# Patient Record
Sex: FEMALE | Race: WHITE | NOT HISPANIC OR LATINO | URBAN - METROPOLITAN AREA
[De-identification: names, ages, dates, MRNs, and addresses within clinical notes are randomized per-mention and may not be internally consistent; named-entity substitution may affect disease eponyms.]

---

## 2022-09-23 ENCOUNTER — EMERGENCY (EMERGENCY)
Facility: HOSPITAL | Age: 87
LOS: 1 days | Discharge: HOME | End: 2022-09-23
Attending: STUDENT IN AN ORGANIZED HEALTH CARE EDUCATION/TRAINING PROGRAM | Admitting: STUDENT IN AN ORGANIZED HEALTH CARE EDUCATION/TRAINING PROGRAM
Payer: MEDICARE

## 2022-09-23 VITALS
SYSTOLIC BLOOD PRESSURE: 124 MMHG | HEART RATE: 84 BPM | RESPIRATION RATE: 18 BRPM | DIASTOLIC BLOOD PRESSURE: 68 MMHG | TEMPERATURE: 99 F | OXYGEN SATURATION: 94 %

## 2022-09-23 VITALS
TEMPERATURE: 98 F | DIASTOLIC BLOOD PRESSURE: 81 MMHG | RESPIRATION RATE: 18 BRPM | OXYGEN SATURATION: 95 % | WEIGHT: 162.04 LBS | HEART RATE: 101 BPM | HEIGHT: 66 IN | SYSTOLIC BLOOD PRESSURE: 171 MMHG

## 2022-09-23 LAB
ALBUMIN SERPL ELPH-MCNC: 4.2 G/DL — SIGNIFICANT CHANGE UP (ref 3.3–5)
ALP SERPL-CCNC: 63 U/L — SIGNIFICANT CHANGE UP (ref 40–120)
ALT FLD-CCNC: 32 U/L — SIGNIFICANT CHANGE UP (ref 10–45)
ANION GAP SERPL CALC-SCNC: 14 MMOL/L — SIGNIFICANT CHANGE UP (ref 5–17)
APTT BLD: 27.6 SEC — SIGNIFICANT CHANGE UP (ref 27.5–35.5)
AST SERPL-CCNC: 39 U/L — SIGNIFICANT CHANGE UP (ref 10–40)
BILIRUB SERPL-MCNC: 0.9 MG/DL — SIGNIFICANT CHANGE UP (ref 0.2–1.2)
BUN SERPL-MCNC: 19 MG/DL — SIGNIFICANT CHANGE UP (ref 7–23)
CALCIUM SERPL-MCNC: 9.5 MG/DL — SIGNIFICANT CHANGE UP (ref 8.4–10.5)
CHLORIDE SERPL-SCNC: 97 MMOL/L — SIGNIFICANT CHANGE UP (ref 96–108)
CO2 SERPL-SCNC: 24 MMOL/L — SIGNIFICANT CHANGE UP (ref 22–31)
CREAT SERPL-MCNC: 0.37 MG/DL — LOW (ref 0.5–1.3)
EGFR: 93 ML/MIN/1.73M2 — SIGNIFICANT CHANGE UP
GLUCOSE SERPL-MCNC: 126 MG/DL — HIGH (ref 70–99)
HCT VFR BLD CALC: 36.9 % — SIGNIFICANT CHANGE UP (ref 34.5–45)
HGB BLD-MCNC: 11.8 G/DL — SIGNIFICANT CHANGE UP (ref 11.5–15.5)
INR BLD: 1.22 — HIGH (ref 0.88–1.16)
MCHC RBC-ENTMCNC: 26.1 PG — LOW (ref 27–34)
MCHC RBC-ENTMCNC: 32 GM/DL — SIGNIFICANT CHANGE UP (ref 32–36)
MCV RBC AUTO: 81.6 FL — SIGNIFICANT CHANGE UP (ref 80–100)
NRBC # BLD: 0 /100 WBCS — SIGNIFICANT CHANGE UP (ref 0–0)
PLATELET # BLD AUTO: 204 K/UL — SIGNIFICANT CHANGE UP (ref 150–400)
POTASSIUM SERPL-MCNC: 4.5 MMOL/L — SIGNIFICANT CHANGE UP (ref 3.5–5.3)
POTASSIUM SERPL-SCNC: 4.5 MMOL/L — SIGNIFICANT CHANGE UP (ref 3.5–5.3)
PROT SERPL-MCNC: 7.4 G/DL — SIGNIFICANT CHANGE UP (ref 6–8.3)
PROTHROM AB SERPL-ACNC: 14.5 SEC — HIGH (ref 10.5–13.4)
RBC # BLD: 4.52 M/UL — SIGNIFICANT CHANGE UP (ref 3.8–5.2)
RBC # FLD: 15.2 % — HIGH (ref 10.3–14.5)
SODIUM SERPL-SCNC: 135 MMOL/L — SIGNIFICANT CHANGE UP (ref 135–145)
WBC # BLD: 8.14 K/UL — SIGNIFICANT CHANGE UP (ref 3.8–10.5)
WBC # FLD AUTO: 8.14 K/UL — SIGNIFICANT CHANGE UP (ref 3.8–10.5)

## 2022-09-23 PROCEDURE — 99283 EMERGENCY DEPT VISIT LOW MDM: CPT

## 2022-09-23 PROCEDURE — 85027 COMPLETE CBC AUTOMATED: CPT

## 2022-09-23 PROCEDURE — 36415 COLL VENOUS BLD VENIPUNCTURE: CPT

## 2022-09-23 PROCEDURE — 80053 COMPREHEN METABOLIC PANEL: CPT

## 2022-09-23 PROCEDURE — 85610 PROTHROMBIN TIME: CPT

## 2022-09-23 PROCEDURE — 99284 EMERGENCY DEPT VISIT MOD MDM: CPT

## 2022-09-23 PROCEDURE — 85730 THROMBOPLASTIN TIME PARTIAL: CPT

## 2022-09-23 NOTE — ED PROVIDER NOTE - CLINICAL SUMMARY MEDICAL DECISION MAKING FREE TEXT BOX
Nosebleed on eliquis s/p cauterization 2 days ago at [facility].  Pt is HDS in ED, no bleeding at present. Will monitor for continued bleeding while checking basic labs r/o sig anemia.  Pt will not take home dose of eliquis tonight and re-start meds tomorrow.  Instructed to f/u w ENT at [facility] who performed procedure.  Return precautions given for recurrence of bleeding, lightheadedness, syncope.

## 2022-09-23 NOTE — ED PROVIDER NOTE - OBJECTIVE STATEMENT
89 yo F w PMH afib on eliquis, HTN, TIA, p/w nosebleed from home. Pt had caudery 2 days ago at [facility] for nosebleeds. Since that time she was doing well with no bleed until today just prior to arrival when she spit out a blood clot from her mouth and started having bleeding from her nose. EMS brought pt to [facility] ED where the bleeding has since stopped. No additional bleeding per nares or mouth since arrival. Pt has no lightheadedness, syncope, cp, sob, HA, fall/trauma.

## 2022-09-23 NOTE — ED ADULT NURSE NOTE - OBJECTIVE STATEMENT
patient arrived to ED c/o nosebleed that started today. patient on eliquis. no active bleeding at this time. denies fever, cough, shortness of breath, nausea or vomiting.

## 2022-09-23 NOTE — ED PROVIDER NOTE - NSFOLLOWUPINSTRUCTIONS_ED_ALL_ED_FT
Follow up with your primary medical doctor as soon as possible.    Return to the emergency department if your symptoms worsen or if you develop new symptoms.    Epistaxis    Epistaxis is the medical term for a nosebleed. Nosebleeds are common and can be caused by many conditions, such as injury, infections, dry environments, medicines, nose picking, and home heating and cooling systems. Try controlling your nosebleed by pinching your nose continuously for at least 10 minutes. Avoid lying down while you are having a nosebleed. Sit up and lean forward. Avoid blowing or sniffing your nose for a number of hours after having a nosebleed. Resume your normal activities as you are able, but avoid straining, lifting, or bending at the waist for several days. Maintain humidity in your home by using less air conditioning or by using a humidifier.     If your nose was packed by your health care provider, keep the packing inside of your nose until a health care provider removes it. If a balloon catheter was used to pack your nose, do not cut or remove it unless your health care provider has instructed you to do that.     Aspirin and blood thinners make bleeding more likely. If you are prescribed these medicines and you suffer from nosebleeds, ask your health care provider if you should stop taking the medicines or adjust the dose. Do not stop medicines unless directed by your health care provider.    SEEK IMMEDIATE MEDICAL CARE IF YOU HAVE ANY OF THE FOLLOWING SYMPTOMS: nosebleed lasting longer than 20 minutes, unusual bleeding from or bruising on other parts of your body, dizziness or lightheadedness, fainting, nosebleed occurring after a head injury, or fever.

## 2022-09-23 NOTE — ED PROVIDER NOTE - NS ED ROS FT
CONSTITUTIONAL: No fever, no chills, no fatigue  EYES: No eye redness, no visual changes  ENT: No ear pain, no sore throat, + nosebleed  CARDIOVASCULAR: No chest pain, no palpitations  RESPIRATORY: No cough, no SOB  GI: No abdominal pain, no nausea, no vomiting, no constipation, no diarrhea  GENITOURINARY: No dysuria, no frequency, no hematuria  MUSCULOSKELETAL: No back pain, no joint pain, no myalgias  SKIN: No rash, no peripheral edema  NEURO: No headache, no confusion    ALL OTHER SYSTEMS NEGATIVE.

## 2022-09-23 NOTE — ED PROVIDER NOTE - PROGRESS NOTE DETAILS
Pt has no bleeding in ED. Pending CBC. No sig anemia, no more ep bleeding in ED. Will DC w f/u to private ENT at [facility].  Pt is ready for discharge and safe discharge has been established. Pt was treated for epistaxis and showed improvement. Will d/c with outpatient follow-up.    Strict return-precautions were given and understanding was verbalized by patient.

## 2022-09-23 NOTE — ED PROVIDER NOTE - PHYSICAL EXAMINATION
CONSTITUTIONAL: Non-toxic; in no apparent distress  HEAD: Normocephalic; atraumatic  EYES: PERRL; EOM intact   ENMT: External appears normal, no bleeding, no sepal hematoma  NECK: Supple; non-tender  CARD: Normal S1, S2; no murmurs, rubs, or gallops  RESP: Normal chest excursion with respiration; breath sounds clear and equal bilaterally  ABD: Soft, non-distended; non-tender  EXT: Normal ROM in all four extremities; non-tender to palpation  SKIN: Warm, dry, no rash  NEURO:  No focal neurological deficiencies.

## 2022-09-25 DIAGNOSIS — R04.0 EPISTAXIS: ICD-10-CM

## 2022-09-25 DIAGNOSIS — I48.91 UNSPECIFIED ATRIAL FIBRILLATION: ICD-10-CM

## 2022-09-25 DIAGNOSIS — I10 ESSENTIAL (PRIMARY) HYPERTENSION: ICD-10-CM

## 2022-09-25 DIAGNOSIS — Z86.73 PERSONAL HISTORY OF TRANSIENT ISCHEMIC ATTACK (TIA), AND CEREBRAL INFARCTION WITHOUT RESIDUAL DEFICITS: ICD-10-CM

## 2022-09-25 DIAGNOSIS — Z79.01 LONG TERM (CURRENT) USE OF ANTICOAGULANTS: ICD-10-CM

## 2022-12-20 ENCOUNTER — INPATIENT (INPATIENT)
Facility: HOSPITAL | Age: 87
LOS: 13 days | Discharge: NURSING, REHAB OR LONG-TERM CARE | DRG: 291 | End: 2023-01-03
Attending: INTERNAL MEDICINE | Admitting: HOSPITALIST
Payer: MEDICARE

## 2022-12-20 VITALS
OXYGEN SATURATION: 96 % | HEART RATE: 102 BPM | TEMPERATURE: 98 F | HEIGHT: 66 IN | RESPIRATION RATE: 20 BRPM | SYSTOLIC BLOOD PRESSURE: 107 MMHG | WEIGHT: 162.92 LBS | DIASTOLIC BLOOD PRESSURE: 66 MMHG

## 2022-12-20 DIAGNOSIS — E03.9 HYPOTHYROIDISM, UNSPECIFIED: ICD-10-CM

## 2022-12-20 DIAGNOSIS — I10 ESSENTIAL (PRIMARY) HYPERTENSION: ICD-10-CM

## 2022-12-20 DIAGNOSIS — I48.91 UNSPECIFIED ATRIAL FIBRILLATION: ICD-10-CM

## 2022-12-20 DIAGNOSIS — E78.5 HYPERLIPIDEMIA, UNSPECIFIED: ICD-10-CM

## 2022-12-20 DIAGNOSIS — I50.9 HEART FAILURE, UNSPECIFIED: ICD-10-CM

## 2022-12-20 LAB
ALBUMIN SERPL ELPH-MCNC: 4 G/DL — SIGNIFICANT CHANGE UP (ref 3.3–5)
ALP SERPL-CCNC: 72 U/L — SIGNIFICANT CHANGE UP (ref 40–120)
ALT FLD-CCNC: 35 U/L — SIGNIFICANT CHANGE UP (ref 10–45)
ANION GAP SERPL CALC-SCNC: 8 MMOL/L — SIGNIFICANT CHANGE UP (ref 5–17)
APTT BLD: 34.9 SEC — SIGNIFICANT CHANGE UP (ref 27.5–35.5)
AST SERPL-CCNC: 39 U/L — SIGNIFICANT CHANGE UP (ref 10–40)
BASOPHILS # BLD AUTO: 0.05 K/UL — SIGNIFICANT CHANGE UP (ref 0–0.2)
BASOPHILS NFR BLD AUTO: 0.7 % — SIGNIFICANT CHANGE UP (ref 0–2)
BILIRUB SERPL-MCNC: 1.5 MG/DL — HIGH (ref 0.2–1.2)
BUN SERPL-MCNC: 13 MG/DL — SIGNIFICANT CHANGE UP (ref 7–23)
CALCIUM SERPL-MCNC: 9.3 MG/DL — SIGNIFICANT CHANGE UP (ref 8.4–10.5)
CHLORIDE SERPL-SCNC: 94 MMOL/L — LOW (ref 96–108)
CK MB CFR SERPL CALC: 3.9 NG/ML — SIGNIFICANT CHANGE UP (ref 0–6.7)
CK SERPL-CCNC: 91 U/L — SIGNIFICANT CHANGE UP (ref 25–170)
CO2 SERPL-SCNC: 30 MMOL/L — SIGNIFICANT CHANGE UP (ref 22–31)
CREAT SERPL-MCNC: 0.61 MG/DL — SIGNIFICANT CHANGE UP (ref 0.5–1.3)
EGFR: 82 ML/MIN/1.73M2 — SIGNIFICANT CHANGE UP
EOSINOPHIL # BLD AUTO: 0.21 K/UL — SIGNIFICANT CHANGE UP (ref 0–0.5)
EOSINOPHIL NFR BLD AUTO: 3 % — SIGNIFICANT CHANGE UP (ref 0–6)
GLUCOSE SERPL-MCNC: 123 MG/DL — HIGH (ref 70–99)
HCT VFR BLD CALC: 36.3 % — SIGNIFICANT CHANGE UP (ref 34.5–45)
HGB BLD-MCNC: 11.5 G/DL — SIGNIFICANT CHANGE UP (ref 11.5–15.5)
IMM GRANULOCYTES NFR BLD AUTO: 0.4 % — SIGNIFICANT CHANGE UP (ref 0–0.9)
INR BLD: 1.67 — HIGH (ref 0.88–1.16)
LYMPHOCYTES # BLD AUTO: 1.36 K/UL — SIGNIFICANT CHANGE UP (ref 1–3.3)
LYMPHOCYTES # BLD AUTO: 19.6 % — SIGNIFICANT CHANGE UP (ref 13–44)
MCHC RBC-ENTMCNC: 24.8 PG — LOW (ref 27–34)
MCHC RBC-ENTMCNC: 31.7 GM/DL — LOW (ref 32–36)
MCV RBC AUTO: 78.4 FL — LOW (ref 80–100)
MONOCYTES # BLD AUTO: 0.98 K/UL — HIGH (ref 0–0.9)
MONOCYTES NFR BLD AUTO: 14.1 % — HIGH (ref 2–14)
NEUTROPHILS # BLD AUTO: 4.32 K/UL — SIGNIFICANT CHANGE UP (ref 1.8–7.4)
NEUTROPHILS NFR BLD AUTO: 62.2 % — SIGNIFICANT CHANGE UP (ref 43–77)
NRBC # BLD: 0 /100 WBCS — SIGNIFICANT CHANGE UP (ref 0–0)
NT-PROBNP SERPL-SCNC: 3126 PG/ML — HIGH (ref 0–300)
PLATELET # BLD AUTO: 196 K/UL — SIGNIFICANT CHANGE UP (ref 150–400)
POTASSIUM SERPL-MCNC: 4.5 MMOL/L — SIGNIFICANT CHANGE UP (ref 3.5–5.3)
POTASSIUM SERPL-SCNC: 4.5 MMOL/L — SIGNIFICANT CHANGE UP (ref 3.5–5.3)
PROT SERPL-MCNC: 6.9 G/DL — SIGNIFICANT CHANGE UP (ref 6–8.3)
PROTHROM AB SERPL-ACNC: 20 SEC — HIGH (ref 10.5–13.4)
RBC # BLD: 4.63 M/UL — SIGNIFICANT CHANGE UP (ref 3.8–5.2)
RBC # FLD: 16.2 % — HIGH (ref 10.3–14.5)
SARS-COV-2 RNA SPEC QL NAA+PROBE: NEGATIVE — SIGNIFICANT CHANGE UP
SODIUM SERPL-SCNC: 132 MMOL/L — LOW (ref 135–145)
TROPONIN T SERPL-MCNC: 0.01 NG/ML — SIGNIFICANT CHANGE UP (ref 0–0.01)
WBC # BLD: 6.95 K/UL — SIGNIFICANT CHANGE UP (ref 3.8–10.5)
WBC # FLD AUTO: 6.95 K/UL — SIGNIFICANT CHANGE UP (ref 3.8–10.5)

## 2022-12-20 PROCEDURE — 99285 EMERGENCY DEPT VISIT HI MDM: CPT | Mod: FS,25

## 2022-12-20 PROCEDURE — 93010 ELECTROCARDIOGRAM REPORT: CPT

## 2022-12-20 PROCEDURE — 71045 X-RAY EXAM CHEST 1 VIEW: CPT | Mod: 26

## 2022-12-20 RX ORDER — ALPRAZOLAM 0.25 MG
0.25 TABLET ORAL ONCE
Refills: 0 | Status: DISCONTINUED | OUTPATIENT
Start: 2022-12-20 | End: 2022-12-20

## 2022-12-20 RX ORDER — FUROSEMIDE 40 MG
1 TABLET ORAL
Qty: 0 | Refills: 0 | DISCHARGE

## 2022-12-20 RX ORDER — MECLIZINE HCL 12.5 MG
1 TABLET ORAL
Qty: 0 | Refills: 0 | DISCHARGE

## 2022-12-20 RX ORDER — VALSARTAN 80 MG/1
40 TABLET ORAL DAILY
Refills: 0 | Status: DISCONTINUED | OUTPATIENT
Start: 2022-12-21 | End: 2022-12-22

## 2022-12-20 RX ORDER — FUROSEMIDE 40 MG
40 TABLET ORAL EVERY 12 HOURS
Refills: 0 | Status: DISCONTINUED | OUTPATIENT
Start: 2022-12-20 | End: 2022-12-21

## 2022-12-20 RX ORDER — INFLUENZA VIRUS VACCINE 15; 15; 15; 15 UG/.5ML; UG/.5ML; UG/.5ML; UG/.5ML
0.7 SUSPENSION INTRAMUSCULAR ONCE
Refills: 0 | Status: COMPLETED | OUTPATIENT
Start: 2022-12-20 | End: 2022-12-20

## 2022-12-20 RX ORDER — LEVOTHYROXINE SODIUM 125 MCG
1 TABLET ORAL
Qty: 0 | Refills: 0 | DISCHARGE

## 2022-12-20 RX ORDER — ATORVASTATIN CALCIUM 80 MG/1
20 TABLET, FILM COATED ORAL AT BEDTIME
Refills: 0 | Status: DISCONTINUED | OUTPATIENT
Start: 2022-12-20 | End: 2023-01-03

## 2022-12-20 RX ORDER — VALSARTAN 80 MG/1
1 TABLET ORAL
Qty: 0 | Refills: 0 | DISCHARGE

## 2022-12-20 RX ORDER — ATENOLOL 25 MG/1
25 TABLET ORAL DAILY
Refills: 0 | Status: DISCONTINUED | OUTPATIENT
Start: 2022-12-20 | End: 2022-12-21

## 2022-12-20 RX ORDER — POTASSIUM CHLORIDE 20 MEQ
1 PACKET (EA) ORAL
Qty: 0 | Refills: 0 | DISCHARGE

## 2022-12-20 RX ORDER — FUROSEMIDE 40 MG
40 TABLET ORAL ONCE
Refills: 0 | Status: COMPLETED | OUTPATIENT
Start: 2022-12-20 | End: 2022-12-20

## 2022-12-20 RX ORDER — APIXABAN 2.5 MG/1
5 TABLET, FILM COATED ORAL EVERY 12 HOURS
Refills: 0 | Status: DISCONTINUED | OUTPATIENT
Start: 2022-12-20 | End: 2022-12-25

## 2022-12-20 RX ORDER — LEVOTHYROXINE SODIUM 125 MCG
100 TABLET ORAL DAILY
Refills: 0 | Status: DISCONTINUED | OUTPATIENT
Start: 2022-12-21 | End: 2023-01-03

## 2022-12-20 RX ADMIN — Medication 40 MILLIGRAM(S): at 17:43

## 2022-12-20 RX ADMIN — APIXABAN 5 MILLIGRAM(S): 2.5 TABLET, FILM COATED ORAL at 22:52

## 2022-12-20 RX ADMIN — ATORVASTATIN CALCIUM 20 MILLIGRAM(S): 80 TABLET, FILM COATED ORAL at 22:53

## 2022-12-20 RX ADMIN — Medication 0.25 MILLIGRAM(S): at 22:53

## 2022-12-20 NOTE — H&P ADULT - NSHPLABSRESULTS_GEN_ALL_CORE
11.5   6.95  )-----------( 196      ( 20 Dec 2022 17:07 )             36.3     132<L>  |  94<L>  |  13  ----------------------------<  123<H>  4.5   |  30  |  0.61    Ca    9.3      20 Dec 2022 17:07    TPro  6.9  /  Alb  4.0  /  TBili  1.5<H>  /  DBili  x   /  AST  39  /  ALT  35  /  AlkPhos  72  12-20    PT/INR - ( 20 Dec 2022 17:07 )   PT: 20.0 sec;   INR: 1.67        PTT - ( 20 Dec 2022 17:07 )  PTT:34.9 sec    CARDIAC MARKERS ( 20 Dec 2022 17:07 )  x     / 0.01 ng/mL / 91 U/L / x     / 3.9 ng/mL    EKG: AFib w/ RVR

## 2022-12-20 NOTE — H&P ADULT - PROBLEM SELECTOR PLAN 1
--PMHx of CHF takes Lasix 20mg PO BID at home.   --Worsening LE edema over 2 months; denies any respiratory symptoms.   --Pt car to [facility] and bedside echo possible reduced EF.   --BNP 3126.   --TTE ordered - f/u.   --CONT: Lasix 40mg IV BID.  --Indwelling soto placed in ER -- **needs to be removed and replaced with primafit once patient is on the floor.    --PLAN: IV diuresis; recommend ACE wraps tomorrow (pt currently w/ adequate UOP); TTE for structural eval.

## 2022-12-20 NOTE — H&P ADULT - ASSESSMENT
Pt is 89yo F w/ PMHx of HTN, HLD, Hypothyroidism, TIA, s/p AVR, AFib (on Eliquis, s/p ablation > 10yrs ago), CHF (per outpatient bedside echo EF appears reduced) who presented to [facility] ED on 12/20/22 endorsing worsening LE edema over the past 2 months. Pt's outpatient cardiologist, Dr. David, sent her to [facility], at which time a bedside limited echo was done showing a possibly reduced LVEF. Pt found to have volume overload peripherally and in AFib w/ RVR (HR 100s). Pt denies CP, SOB, palpitations, dizziness, orthopnea, N/V, fever/chills, syncope, PND.     VITALS: HR 100s, -120/66-90, SpO2 98% on RA, RR 18, T 97.5F.   LABS: WBC 6.95, Hgb/Hct 11.5/36.3, Plt Cnt 196, Na 132, K 4.5, BUN/Cr 13/0.61, Trop T 0.01, CK 91, CKMB 39, BNP 3126.     TREATMENT IN ED: Lasix 40mg IV x1.     Patient admitted to cardiology for heart failure exacerbation and atrial fibrillation.  89yo F w/ PMHx of HTN, HLD, Hypothyroidism, TIA, s/p AVR, AFib (on Eliquis, s/p ablation > 10yrs ago), CHF (per outpt bedside echo EF appeared reduced) who presented w/ volume overload w/ worsening LE edema over the past 2 months. Pt also in AFib w/ RVR. IV diuresis w/ Lasix 40mg IV BID. [facility] EP consult in AM for rhythm management (Dr. Sarai wallace from ER provider).

## 2022-12-20 NOTE — H&P ADULT - PROBLEM SELECTOR PLAN 2
--AFib w/ RVR w/ HR in 100s.   --HOME MEDS:   --s/p ablation > 10yrs ago. Known by Dr. Moon.   --CONT: ____  --Official EP consult in AM (Dr. Moon) to discuss rhythm management - ER team spoke to Dr. Moon. --AFib w/ RVR w/ HR in 100s.   --s/p ablation > 10yrs ago. Known by Dr. Moon.   --CONT: Eliquis 5mg PO BID and Atenolol 25mg PO QD.   --Official EP consult in AM (Dr. Moon) to discuss rhythm management - ER team spoke to Dr. Moon.

## 2022-12-20 NOTE — H&P ADULT - NS ATTEND AMEND GEN_ALL_CORE FT
Initial attending contact date 12.21.22 on morning bedside rounds. See attending addendum to HPI here for initial attending contact documentation.  89F w/ Essential  HTN, HLD, Hypothyroidism, hx TIA, hx AVR, AFib (on Eliquis, s/p ablation > 10yrs ago), Chronic CHF of unknown specificity who p/w acute on chronic decompensated CHF and AFib w/ RVR.   Physical Exam notable for: elderly patient laying in bed in NAD, elevated neck veins, irreg irreg rhythm, tachycardic rate, no MGR detected at high rate, diminished breath sounds in bases, overly nourished abdomen, no fluid wave detected, anasarca to hips b/l, skin WWP, A&Ox3, heavy assist to mobilize in bed, wheelchair noted at bedside    Plan for:  Diurese with IV Lasix 40 TID, goal net neg 2-3L/day  Lopressor for rate control, Eliquis for afib cva risk reduction  EP consulted for rate/rhythm control  Obtain TTE for structural assessment  Core Measure CHF orders  ACE wrap lower extremities to the thighs bilaterally  Bedside CHF Education, supplies to be given to patient  PT consult, patient high probability for JENNIE  Dietician/Nutrition consult for CHF/cardiac diet reinforcement  Future planning: patient to have 1wk f/u appt made with Cardiologist for quality improvement CHF readmission risk reduction  Kit Arciniega M.D.  Cardiology Attending

## 2022-12-20 NOTE — ED PROVIDER NOTE - CLINICAL SUMMARY MEDICAL DECISION MAKING FREE TEXT BOX
89 yo F with PMH afib on eliquis s/p ablation >10 years ago, HTN, HLD, hypothyroidism, TIA, s/p AV replacement sent in for afib and CHF. Pt reports progressively worsening LE swelling x 2months. Denies fever, chills, cp, sob, palpitations, sweats, n/v, dizziness, orthopnea, PND. , /66, HR 96%. EKG afib @ 109. Lungs cta b/l. 4+ pitting edema to knees. 89 yo F with PMH afib on eliquis s/p ablation >10 years ago, HTN, HLD, hypothyroidism, TIA, s/p AV replacement sent in for afib and CHF. Pt reports progressively worsening LE swelling x 2months. Denies fever, chills, cp, sob, palpitations, sweats, n/v, dizziness, orthopnea, PND. , /66, HR 96%. EKG afib @ 109. Lungs cta b/l. 4+ pitting edema to knees. CHF/afib, labs, diuresis, r/o acs

## 2022-12-20 NOTE — H&P ADULT - RESPIRATORY
normal/clear to auscultation bilaterally/no wheezes/no rales/no rhonchi/no respiratory distress/no use of accessory muscles/no subcutaneous emphysema/airway patent/breath sounds equal/respirations non-labored

## 2022-12-20 NOTE — PATIENT PROFILE ADULT - DO YOU FEEL LIKE HURTING YOURSELF OR OTHERS?
Render Note In Bullet Format When Appropriate: No Duration Of Freeze Thaw-Cycle (Seconds): 0 Post-Care Instructions: I reviewed with the patient in detail post-care instructions. Patient is to wear sunprotection, and avoid picking at any of the treated lesions. Pt may apply Vaseline to crusted or scabbing areas. Number Of Freeze-Thaw Cycles: 1 freeze-thaw cycle Detail Level: Detailed Consent: The patient's consent was obtained including but not limited to risks of crusting, scabbing, blistering, scarring, darker or lighter pigmentary change, recurrence, incomplete removal and infection. Medical Necessity Clause: This procedure was medically necessary because the lesions that were treated were: Medical Necessity Information: It is in your best interest to select a reason for this procedure from the list below. All of these items fulfill various CMS LCD requirements except the new and changing color options. no

## 2022-12-20 NOTE — H&P ADULT - PROBLEM SELECTOR PLAN 5
--CONT: ___ --CONT: Atorvastatin 20mg PO QHS  --f/u lipid profile in AM.      DVT ppx: Eliquis  Dispo: pending euvolemic / completion of work up  PT Eval: pending

## 2022-12-20 NOTE — H&P ADULT - GASTROINTESTINAL
Labs look great. normal/soft/nontender/nondistended/normal active bowel sounds/no guarding/no rigidity

## 2022-12-20 NOTE — ED ADULT NURSE NOTE - OBJECTIVE STATEMENT
89 year old F patient, A+Ox3, brought in accompanied by daughter with c/o of being sent from EP office for afib episode.  Pt has no complaints.  Bilateral leg swelling noted, as per daughter "worsening this week".  No distress noted.  Breathing easily and unlabored, denies sob.  Denies chest pain.  Placed on CM, line placed, soto placed, medicated as ordered.  Will continue to monitor.

## 2022-12-20 NOTE — ED PROVIDER NOTE - OBJECTIVE STATEMENT
89 yo F with PMH afib on eliquis s/p ablation >10 years ago, HTN, HLD, hypothyroidism, TIA, s/p AV replacement sent in for afib and CHF. Pt reports progressively worsening LE swelling x 2months. Pt was sent to [facility] by her cardiologist Dr. David at [facility]. Pt was found to be in afib and had a bedside echo which showed a reduced EF. Pts cardiolgist states she was doing well up until a couple of months ago and she has been unable to properly manage her at home. Pts cardiologist spoke with Dr. Moon who advised her to come here. Denies fever, chills, cp, sob, palpitations, sweats, n/v, dizziness, orthopnea, PND.

## 2022-12-20 NOTE — ED PROVIDER NOTE - PROGRESS NOTE DETAILS
spoke with Dr. Moon EP team will see pt tomorrow. Will admit for cards for CHF/afib, IV diuresis soto placed, pt put out 250cc in 30 min

## 2022-12-20 NOTE — H&P ADULT - HISTORY OF PRESENT ILLNESS
Pt is 89yo F w/ PMHx of HTN, HLD, Hypothyroidism, TIA, s/p AVR, AFib (on Eliquis, s/p ablation > 10yrs ago), CHF (per outpatient bedside echo EF appears reduced) who presented to [facility] ED on 12/20/22 endorsing worsening LE edema over the past 2 months. Pt's outpatient cardiologist, Dr. David, sent her to [facility], at which time a bedside limited echo was done showing a possibly reduced LVEF. Pt found to have volume overload peripherally and in AFib w/ RVR (HR 100s). Pt denies CP, SOB, palpitations, dizziness, orthopnea, N/V, fever/chills, syncope, PND.     VITALS: HR 100s, -120/66-90, SpO2 98% on RA, RR 18, T 97.5F.   LABS: WBC 6.95, Hgb/Hct 11.5/36.3, Plt Cnt 196, Na 132, K 4.5, BUN/Cr 13/0.61, Trop T 0.01, CK 91, CKMB 39, BNP 3126.     TREATMENT IN ED: Lasix 40mg IV x1.     Patient admitted to cardiology for heart failure exacerbation and atrial fibrillation.

## 2022-12-20 NOTE — H&P ADULT - NSICDXPASTMEDICALHX_GEN_ALL_CORE_FT
PAST MEDICAL HISTORY:  Atrial fibrillation     Congestive heart failure (CHF)     HLD (hyperlipidemia)     HTN (hypertension)     Hypothyroidism     TIA (transient ischemic attack)

## 2022-12-20 NOTE — ED ADULT TRIAGE NOTE - CHIEF COMPLAINT QUOTE
sent in to see MD Moon. DX with afib on dec 5th, was in the office today was found to be in afib. PT reports palpitations. denies SOB, dizziness, CP.  on Eliquis.

## 2022-12-20 NOTE — ED PROVIDER NOTE - PHYSICAL EXAMINATION
CONSTITUTIONAL: elderly female lying in bed in NAD   HEAD: Normocephalic; atraumatic.   EYES: PERRL; EOM intact; conjunctiva and sclera clear  ENT: normal nose; no rhinorrhea; normal pharynx with no erythema or lesions.   NECK: Supple; non-tender; no LAD  CARDIOVASCULAR: Normal S1, S2; No audible murmurs. irregularly irregular  RESPIRATORY: Breathing easily; breath sounds clear and equal bilaterally; no wheezes, rhonchi, or rales.  GI: Soft; non-distended; non-tender; no palpable organomegaly.   MSK: FROM at all extremities, normal tone   EXT: b/l 4+ pitting edema up to knees   SKIN: Normal for age and race; warm; dry; good turgor; no apparent lesions or rash.   NEURO: A & O x 3; face symmetric; grossly unremarkable.   PSYCHOLOGICAL: The patient’s mood and manner are appropriate.

## 2022-12-20 NOTE — PATIENT PROFILE ADULT - FALL HARM RISK - HARM RISK INTERVENTIONS

## 2022-12-20 NOTE — ED PROVIDER NOTE - NS ED ATTENDING STATEMENT MOD
This was a shared visit with the AMIE. I reviewed and verified the documentation and independently performed the documented:

## 2022-12-21 DIAGNOSIS — R17 UNSPECIFIED JAUNDICE: ICD-10-CM

## 2022-12-21 LAB
A1C WITH ESTIMATED AVERAGE GLUCOSE RESULT: 6.4 % — HIGH (ref 4–5.6)
ALBUMIN SERPL ELPH-MCNC: 3.7 G/DL — SIGNIFICANT CHANGE UP (ref 3.3–5)
ALP SERPL-CCNC: 66 U/L — SIGNIFICANT CHANGE UP (ref 40–120)
ALT FLD-CCNC: 32 U/L — SIGNIFICANT CHANGE UP (ref 10–45)
ANION GAP SERPL CALC-SCNC: 11 MMOL/L — SIGNIFICANT CHANGE UP (ref 5–17)
APPEARANCE UR: CLEAR — SIGNIFICANT CHANGE UP
AST SERPL-CCNC: 34 U/L — SIGNIFICANT CHANGE UP (ref 10–40)
BACTERIA # UR AUTO: SIGNIFICANT CHANGE UP /HPF
BILIRUB SERPL-MCNC: 1.4 MG/DL — HIGH (ref 0.2–1.2)
BILIRUB SERPL-MCNC: 1.6 MG/DL — HIGH (ref 0.2–1.2)
BILIRUB UR-MCNC: NEGATIVE — SIGNIFICANT CHANGE UP
BUN SERPL-MCNC: 13 MG/DL — SIGNIFICANT CHANGE UP (ref 7–23)
CALCIUM SERPL-MCNC: 9 MG/DL — SIGNIFICANT CHANGE UP (ref 8.4–10.5)
CHLORIDE SERPL-SCNC: 94 MMOL/L — LOW (ref 96–108)
CHOLEST SERPL-MCNC: 125 MG/DL — SIGNIFICANT CHANGE UP
CO2 SERPL-SCNC: 28 MMOL/L — SIGNIFICANT CHANGE UP (ref 22–31)
COLOR SPEC: YELLOW — SIGNIFICANT CHANGE UP
CREAT SERPL-MCNC: 0.55 MG/DL — SIGNIFICANT CHANGE UP (ref 0.5–1.3)
DIFF PNL FLD: NEGATIVE — SIGNIFICANT CHANGE UP
EGFR: 84 ML/MIN/1.73M2 — SIGNIFICANT CHANGE UP
EPI CELLS # UR: SIGNIFICANT CHANGE UP /HPF (ref 0–5)
ESTIMATED AVERAGE GLUCOSE: 137 MG/DL — HIGH (ref 68–114)
FERRITIN SERPL-MCNC: 28 NG/ML — SIGNIFICANT CHANGE UP (ref 15–150)
GLUCOSE SERPL-MCNC: 111 MG/DL — HIGH (ref 70–99)
GLUCOSE UR QL: NEGATIVE — SIGNIFICANT CHANGE UP
HCT VFR BLD CALC: 36.7 % — SIGNIFICANT CHANGE UP (ref 34.5–45)
HDLC SERPL-MCNC: 59 MG/DL — SIGNIFICANT CHANGE UP
HGB BLD-MCNC: 11.4 G/DL — LOW (ref 11.5–15.5)
HYALINE CASTS # UR AUTO: SIGNIFICANT CHANGE UP /LPF (ref 0–2)
IRON SATN MFR SERPL: 33 UG/DL — SIGNIFICANT CHANGE UP (ref 30–160)
IRON SATN MFR SERPL: 9 % — LOW (ref 14–50)
KETONES UR-MCNC: NEGATIVE — SIGNIFICANT CHANGE UP
LEUKOCYTE ESTERASE UR-ACNC: ABNORMAL
LIPID PNL WITH DIRECT LDL SERPL: 52 MG/DL — SIGNIFICANT CHANGE UP
MAGNESIUM SERPL-MCNC: 1.9 MG/DL — SIGNIFICANT CHANGE UP (ref 1.6–2.6)
MCHC RBC-ENTMCNC: 24.3 PG — LOW (ref 27–34)
MCHC RBC-ENTMCNC: 31.1 GM/DL — LOW (ref 32–36)
MCV RBC AUTO: 78.3 FL — LOW (ref 80–100)
NITRITE UR-MCNC: NEGATIVE — SIGNIFICANT CHANGE UP
NON HDL CHOLESTEROL: 66 MG/DL — SIGNIFICANT CHANGE UP
NRBC # BLD: 0 /100 WBCS — SIGNIFICANT CHANGE UP (ref 0–0)
OSMOLALITY SERPL: 277 MOSM/KG — LOW (ref 280–301)
OSMOLALITY UR: 311 MOSM/KG — SIGNIFICANT CHANGE UP (ref 300–900)
PH UR: 6.5 — SIGNIFICANT CHANGE UP (ref 5–8)
PLATELET # BLD AUTO: 188 K/UL — SIGNIFICANT CHANGE UP (ref 150–400)
POTASSIUM SERPL-MCNC: 4 MMOL/L — SIGNIFICANT CHANGE UP (ref 3.5–5.3)
POTASSIUM SERPL-SCNC: 4 MMOL/L — SIGNIFICANT CHANGE UP (ref 3.5–5.3)
PROT SERPL-MCNC: 6.5 G/DL — SIGNIFICANT CHANGE UP (ref 6–8.3)
PROT UR-MCNC: NEGATIVE MG/DL — SIGNIFICANT CHANGE UP
RBC # BLD: 4.69 M/UL — SIGNIFICANT CHANGE UP (ref 3.8–5.2)
RBC # FLD: 16.1 % — HIGH (ref 10.3–14.5)
RBC CASTS # UR COMP ASSIST: < 5 /HPF — SIGNIFICANT CHANGE UP
SODIUM SERPL-SCNC: 133 MMOL/L — LOW (ref 135–145)
SP GR SPEC: 1.01 — SIGNIFICANT CHANGE UP (ref 1–1.03)
T4 AB SER-ACNC: 6.61 UG/DL — SIGNIFICANT CHANGE UP (ref 4.5–11.7)
TIBC SERPL-MCNC: 367 UG/DL — SIGNIFICANT CHANGE UP (ref 220–430)
TRIGL SERPL-MCNC: 68 MG/DL — SIGNIFICANT CHANGE UP
TSH SERPL-MCNC: 2.83 UIU/ML — SIGNIFICANT CHANGE UP (ref 0.27–4.2)
UIBC SERPL-MCNC: 334 UG/DL — SIGNIFICANT CHANGE UP (ref 110–370)
UROBILINOGEN FLD QL: 0.2 E.U./DL — SIGNIFICANT CHANGE UP
WBC # BLD: 6.51 K/UL — SIGNIFICANT CHANGE UP (ref 3.8–10.5)
WBC # FLD AUTO: 6.51 K/UL — SIGNIFICANT CHANGE UP (ref 3.8–10.5)
WBC UR QL: ABNORMAL /HPF

## 2022-12-21 PROCEDURE — 93306 TTE W/DOPPLER COMPLETE: CPT | Mod: 26

## 2022-12-21 PROCEDURE — 99223 1ST HOSP IP/OBS HIGH 75: CPT

## 2022-12-21 PROCEDURE — 99222 1ST HOSP IP/OBS MODERATE 55: CPT

## 2022-12-21 RX ORDER — ALPRAZOLAM 0.25 MG
0.25 TABLET ORAL ONCE
Refills: 0 | Status: DISCONTINUED | OUTPATIENT
Start: 2022-12-21 | End: 2022-12-21

## 2022-12-21 RX ORDER — METOPROLOL TARTRATE 50 MG
25 TABLET ORAL EVERY 6 HOURS
Refills: 0 | Status: DISCONTINUED | OUTPATIENT
Start: 2022-12-21 | End: 2022-12-22

## 2022-12-21 RX ORDER — METOPROLOL TARTRATE 50 MG
25 TABLET ORAL
Refills: 0 | Status: DISCONTINUED | OUTPATIENT
Start: 2022-12-21 | End: 2022-12-21

## 2022-12-21 RX ADMIN — Medication 25 MILLIGRAM(S): at 23:33

## 2022-12-21 RX ADMIN — Medication 25 MILLIGRAM(S): at 18:31

## 2022-12-21 RX ADMIN — VALSARTAN 40 MILLIGRAM(S): 80 TABLET ORAL at 06:22

## 2022-12-21 RX ADMIN — ATENOLOL 25 MILLIGRAM(S): 25 TABLET ORAL at 06:23

## 2022-12-21 RX ADMIN — APIXABAN 5 MILLIGRAM(S): 2.5 TABLET, FILM COATED ORAL at 23:34

## 2022-12-21 RX ADMIN — Medication 25 MILLIGRAM(S): at 09:50

## 2022-12-21 RX ADMIN — APIXABAN 5 MILLIGRAM(S): 2.5 TABLET, FILM COATED ORAL at 12:00

## 2022-12-21 RX ADMIN — Medication 100 MICROGRAM(S): at 06:22

## 2022-12-21 RX ADMIN — ATORVASTATIN CALCIUM 20 MILLIGRAM(S): 80 TABLET, FILM COATED ORAL at 22:48

## 2022-12-21 RX ADMIN — Medication 40 MILLIGRAM(S): at 06:25

## 2022-12-21 NOTE — CONSULT NOTE ADULT - ASSESSMENT
89-year-old female with a PMHx of HTN, HLD, hypothyroidism, TIA, history of AVR, Afib (s/p remote ablation, on Eliquis) and CHF (unknown EF) who presented with worsening lower extremity edema and Afib with RVR.       #Congestive Heart Failure   -further management as per primary team    -follow up TTE   -currently on IV lasix 40mg BID   -strict I/Os and fluid restriction    -consider palliative care consult pending clinical course      #Atrial Fibrillation with RVR   -further management as per primary team    -continue with Eliquis 5mg BID   -continue with metoprolol tartrate 25mg q6hrs (home atenolol 25mg daily held)    -EP consult as per primary team for rhythm management    #Hypothyroidism    -continue with levothyroxine 100mcg daily   -TSH and T4 within normal limits      #Elevated Bilirubin    -unclear etiology of minor elevation, no signs or symptoms of biliary pathology    -obtain indirect bilirubin and RUQ US      #Microcytic Anemia    -obtain iron studies and ferritin      #Hyponatremia    -mild and asymptomatic    -obtain serum osm, urine osm and urine electroytes (with urine urea given diuresis)      #Pre-Diabetes (A1c 6.4%)   -no history of DMII and is not on any medications   -outpatient PCP follow up     #HTN   -continue with valsartan 40mg daily      #HLD   -continue with atorvastatin 20mg daily      DVT PPx: Eliquis    Dispo: pending PT evaluation

## 2022-12-21 NOTE — CONSULT NOTE ADULT - SUBJECTIVE AND OBJECTIVE BOX
89-year-old female with a PMHx of HTN, HLD, hypothyroidism, TIA, history of AVR, Afib (s/p remote ablation, on Eliquis) and CHF (unknown EF) who presented with worsening lower extremity edema and Afib with RVR.  Patient to be admitted to cardiology service to further management.  Medicine to follow as co-management.      Today, patient was seen resting comfortably in bed with HHA at bedside.  States she is overall doing well today.  Denies CP or subjective SOB.  Last bowel movement was monday but patient feels bloated.  Denies HA, dizziness, palpitations, abdominal pain, nausea, vomiting, fever, chills or diarrhea.     PMHx/PSHx: as per HPI     FHx: non-contributory       SHx: denies alcohol use, former smoker, denies illicit drug use       Allergies: NKDA     Home Medications:    -Eliquis 5mg BID   -Atenolol 25mg daily   -Furosemide 20mg BID   -Meclzine 12.5mg TID PRN   -Xanax 0.25mg q6hrs PRN    -Atorvastatin 20mg daily   -Levothyroxine 100mcg daily   -Kcl 10 mEq daily   -Valsartan 40mg daily    Objective:  Vital Signs:  Vital Signs Last 24 Hrs  T(C): 36.1 (21 Dec 2022 08:37), Max: 36.6 (20 Dec 2022 22:07)  T(F): 97 (21 Dec 2022 08:37), Max: 97.9 (20 Dec 2022 22:07)  HR: 126 (21 Dec 2022 08:39) (85 - 134)  BP: 118/72 (21 Dec 2022 08:39) (107/66 - 136/75)  BP(mean): --  RR: 18 (21 Dec 2022 08:39) (15 - 20)  SpO2: 98% (21 Dec 2022 08:39) (93% - 98%)    Parameters below as of 21 Dec 2022 08:39  Patient On (Oxygen Delivery Method): room air    Physical Exam:  -Gen: NAD, resting in bed, pleasant  -HEENT: EOMI, PERRL, no JVD, no bruits  -CV: irregular and tachycardic, no murmurs appreciated   -Lungs: bibasilar crackles, normal respiratory effort on NC  -Ab: mild distension, soft, NT, normal BS  -Ext: significant bilateral pitting edema, wrapped in ACE wraps  -Neuro: A&O x 3, no focal deficits     Labs:                        11.4   6.51  )-----------( 188      ( 21 Dec 2022 06:19 )             36.7   12-21    133<L>  |  94<L>  |  13  ----------------------------<  111<H>  4.0   |  28  |  0.55    Ca    9.0      21 Dec 2022 06:19  Mg     1.9     12-21    TPro  6.5  /  Alb  3.7  /  TBili  1.6<H>  /  DBili  x   /  AST  34  /  ALT  32  /  AlkPhos  66  12-21    Medications:  MEDICATIONS  (STANDING):  apixaban 5 milliGRAM(s) Oral every 12 hours  atorvastatin 20 milliGRAM(s) Oral at bedtime  influenza  Vaccine (HIGH DOSE) 0.7 milliLiter(s) IntraMuscular once  levothyroxine 100 MICROGram(s) Oral daily  metoprolol tartrate 25 milliGRAM(s) Oral every 6 hours  valsartan 40 milliGRAM(s) Oral daily    MEDICATIONS  (PRN):

## 2022-12-21 NOTE — PROGRESS NOTE ADULT - PROBLEM SELECTOR PLAN 6
--Chol 125, TG 68, HDL 59, LDL 52.   --CONT: Atorvastatin 20mg PO QHS      DVT ppx: Eliquis  Dispo: pending euvolemic / completion of work up  PT Eval: pending

## 2022-12-21 NOTE — DIETITIAN INITIAL EVALUATION ADULT - PERTINENT MEDS FT
MEDICATIONS  (STANDING):  apixaban 5 milliGRAM(s) Oral every 12 hours  atorvastatin 20 milliGRAM(s) Oral at bedtime  influenza  Vaccine (HIGH DOSE) 0.7 milliLiter(s) IntraMuscular once  levothyroxine 100 MICROGram(s) Oral daily  metoprolol tartrate 25 milliGRAM(s) Oral every 6 hours  valsartan 40 milliGRAM(s) Oral daily    MEDICATIONS  (PRN):

## 2022-12-21 NOTE — DIETITIAN INITIAL EVALUATION ADULT - PERTINENT LABORATORY DATA
12-21    133<L>  |  94<L>  |  13  ----------------------------<  111<H>  4.0   |  28  |  0.55    Ca    9.0      21 Dec 2022 06:19  Mg     1.9     12-21    TPro  6.5  /  Alb  3.7  /  TBili  1.6<H>  /  DBili  x   /  AST  34  /  ALT  32  /  AlkPhos  66  12-21  A1C with Estimated Average Glucose Result: 6.4 % (12-21-22 @ 06:19)

## 2022-12-21 NOTE — DIETITIAN INITIAL EVALUATION ADULT - OTHER CALCULATIONS
5'6''  pounds+-10%  wt 162 pounds BMI 26.1 %[id number]  IBW used to calculate energy needs due to pt's current body weight exceeding 120% of IBW  Adjust for age and CHF; fluids per team

## 2022-12-21 NOTE — CONSULT NOTE ADULT - SUBJECTIVE AND OBJECTIVE BOX
HPI: 89F PMHx HTN, HLD, Hypothyroidism, TIA, s/p AVR, AFib (on Eliquis, s/p ablation > 10yrs ago), CHF (per outpatient bedside echo EF appears reduced) who presented to [facility] ED on 12/20/22 endorsing worsening LE edema over the past 2 months. Pt's outpatient cardiologist, Dr. David, sent her to [facility], at which time a bedside limited echo was done showing a possibly reduced LVEF. Pt found to have volume overload peripherally and in AFib w/ RVR (HR 100s). Pt denies CP, SOB, palpitations, dizziness, orthopnea, N/V, fever/chills, syncope, PND.     VITALS: HR 100s, -120/66-90, SpO2 98% on RA, RR 18, T 97.5F.   LABS: WBC 6.95, Hgb/Hct 11.5/36.3, Plt Cnt 196, Na 132, K 4.5, BUN/Cr 13/0.61, Trop T 0.01, CK 91, CKMB 39, BNP 3126.     TREATMENT IN ED: Lasix 40mg IV x1.     Patient admitted to cardiology for heart failure exacerbation and atrial fibrillation.  (20 Dec 2022 18:00)    PAST MEDICAL & SURGICAL HISTORY:  HTN (hypertension)  HLD (hyperlipidemia)  Congestive heart failure (CHF)  TIA (transient ischemic attack)  Hypothyroidism  Atrial fibrillation    PREVIOUS DIAGNOSTIC TESTING:    [ ] Echocardiogram:  [ ] Stress Test:  [ ] Catheterization: 	    FAMILY HISTORY:    Social History:    ALLERGIES/INTOLERANCES:  No Known Allergies    HOME MEDICATIONS:    INPATIENT MEDICATIONS:  metoprolol tartrate 25 milliGRAM(s) Oral every 6 hours  valsartan 40 milliGRAM(s) Oral daily  apixaban 5 milliGRAM(s) Oral every 12 hours  atorvastatin 20 milliGRAM(s) Oral at bedtime  influenza  Vaccine (HIGH DOSE) 0.7 milliLiter(s) IntraMuscular once  levothyroxine 100 MICROGram(s) Oral daily    REVIEW OF SYSTEMS: See HPI     PHYSICAL EXAM:  Gen: NAD   HEENT: EOMI   Neck: Flat JVP   Cor: Normal s1, s2. RRR.   Abd: soft, non-tender, non-distended  Ext: no edema  Neuro: alert and attentive    T(C): 36.1 (12-21-22 @ 08:37), Max: 36.6 (12-20-22 @ 22:07)  HR: 126 (12-21-22 @ 08:39) (85 - 134)  BP: 118/72 (12-21-22 @ 08:39) (107/66 - 136/75)  RR: 18 (12-21-22 @ 08:39) (15 - 20)  SpO2: 98% (12-21-22 @ 08:39) (93% - 98%)  Wt(kg): --    I&O's Summary  20 Dec 2022 07:01  -  21 Dec 2022 07:00  --------------------------------------------------------  IN: 0 mL / OUT: 1950 mL / NET: -1950 mL    21 Dec 2022 07:01  -  21 Dec 2022 12:10  --------------------------------------------------------  IN: 180 mL / OUT: 0 mL / NET: 180 mL    TELEMETRY: 	      ECG:  	  	  LABS:                        11.4   6.51  )-----------( 188      ( 21 Dec 2022 06:19 )             36.7     12-21    133<L>  |  94<L>  |  13  ----------------------------<  111<H>  4.0   |  28  |  0.55    Ca    9.0      21 Dec 2022 06:19  Mg     1.9     12-21    TPro  6.5  /  Alb  3.7  /  TBili  1.6<H>  /  DBili  x   /  AST  34  /  ALT  32  /  AlkPhos  66  12-21    Lipid Profile:   HgA1c:   TSH: Thyroid Stimulating Hormone, Serum: 2.830 uIU/mL (12-21 @ 06:19)    CARDIAC MARKERS:    proBNP: Serum Pro-Brain Natriuretic Peptide: 3126 pg/mL (12-20 @ 17:07)    RADIOLOGY:

## 2022-12-21 NOTE — DIETITIAN INITIAL EVALUATION ADULT - ADD RECOMMEND
1. Monitor PO intake/appetite, GI distress (+Bowel reg PRN), diet tolerance, labs, weights.  2. Honor pt food preferences as able.  3. RD to remain available for additional nutrition interventions as needed.   * Moderate Nutrition Risk.

## 2022-12-21 NOTE — PROGRESS NOTE ADULT - PROBLEM SELECTOR PLAN 1
--PMHx of CHF takes Lasix 20mg PO BID at home; LE edema over 2mos; no respiratory symptoms.   --BNP 3126; 4+ LE pitting edema B/L, bilateral rales on lung exam.   --TTE ordered - f/u results.   --CONT: Lasix 40mg IV TID.   --I/Os: -1950cc/12hrs (after lasix 40mg IV x1).   --PLAN: IV diuresis; TTE for structural eval; continue w/ ACE wraps.

## 2022-12-21 NOTE — DIETITIAN INITIAL EVALUATION ADULT - NS FNS DIET ORDER
Diet, DASH/TLC:   Sodium & Cholesterol Restricted  1000mL Fluid Restriction ([id number]) (12-20-22 @ 19:02)

## 2022-12-21 NOTE — PROGRESS NOTE ADULT - SUBJECTIVE AND OBJECTIVE BOX
Cardiology PA Adult Progress Note    Subjective Assessment: Pt seen at bedside. Pt unhappy she remains in the hospital, but understands plan for heart failure management. Denies any SOB, respiratory symptoms, CP, dizziness, palpitations.   	  MEDICATIONS:  metoprolol tartrate 25 milliGRAM(s) Oral every 6 hours  valsartan 40 milliGRAM(s) Oral daily  atorvastatin 20 milliGRAM(s) Oral at bedtime  levothyroxine 100 MICROGram(s) Oral daily  apixaban 5 milliGRAM(s) Oral every 12 hours  influenza  Vaccine (HIGH DOSE) 0.7 milliLiter(s) IntraMuscular once	    PHYSICAL EXAM:  TELEMETRY:  T(C): 36.1 (12-21-22 @ 08:37), Max: 36.6 (12-20-22 @ 22:07)  HR: 126 (12-21-22 @ 08:39) (85 - 134)  BP: 118/72 (12-21-22 @ 08:39) (107/66 - 136/75)  RR: 18 (12-21-22 @ 08:39) (15 - 20)  SpO2: 98% (12-21-22 @ 08:39) (93% - 98%)  Wt(kg): --  I&O's Summary    20 Dec 2022 07:01  -  21 Dec 2022 07:00  --------------------------------------------------------  IN: 0 mL / OUT: 1950 mL / NET: -1950 mL    21 Dec 2022 07:01  -  21 Dec 2022 11:24  --------------------------------------------------------  IN: 180 mL / OUT: 0 mL / NET: 180 mL      Height (cm): 167.6 (12-20 @ 20:01)  Weight (kg): 73.9 (12-20 @ 20:01)  BMI (kg/m2): 26.3 (12-20 @ 20:01)  BSA (m2): 1.83 (12-20 @ 20:01)                                      Appearance: Normal	  HEENT:   Normal oral mucosa, PERRL, EOMI	  Neck: Supple, + JVD  Cardiovascular: Normal S1 S2, No JVD, No murmurs; tachycardiac  Respiratory: bilateral lower lung rales  Gastrointestinal:  Soft, Non-tender, + BS	  Skin: No rashes, No ecchymoses, No cyanosis  Extremities: Normal range of motion, No clubbing, cyanosis. 4+ pitting edema B/L LE up to knees.   Vascular: unable to palpate 2/2 pitting edema  Neurologic: Non-focal  Psychiatry: A & O x 3, Mood & affect appropriate   	    LABS:	 	  CARDIAC MARKERS                        11.4   6.51  )-----------( 188      ( 21 Dec 2022 06:19 )             36.7   133<L>  |  94<L>  |  13  ----------------------------<  111<H>  4.0   |  28  |  0.55    Ca    9.0      21 Dec 2022 06:19    Mg     1.9     12-21    TPro  6.5  /  Alb  3.7  /  TBili  1.6<H>  /  DBili  x   /  AST  34  /  ALT  32  /  AlkPhos  66  12-21    proBNP: Serum Pro-Brain Natriuretic Peptide: 3126 pg/mL (12-20 @ 17:07)    TSH: Thyroid Stimulating Hormone, Serum: 2.830 uIU/mL (12-21 @ 06:19)    PT/INR - ( 20 Dec 2022 17:07 )   PT: 20.0 sec;   INR: 1.67       PTT - ( 20 Dec 2022 17:07 )  PTT:34.9 sec

## 2022-12-21 NOTE — PROGRESS NOTE ADULT - PROBLEM SELECTOR PLAN 2
--AFib w/ RVR w/ HR in 110s-120s.   --Reports ablation w/ Dr. Moon > 10yrs ago.   --HOLD home Atenolol 25mg PO QD.   --CONT: Eliquis 5mg PO BID, Lopressor 25mg PO q6hrs.   --PLAN: EP consulted - will discuss further rhythm management once patient more euvolemic.

## 2022-12-21 NOTE — PROGRESS NOTE ADULT - ASSESSMENT
89yo F w/ PMHx of HTN, HLD, Hypothyroidism, TIA, s/p AVR, AFib (on Eliquis, s/p ablation > 10yrs ago), CHF (per outpt bedside echo EF appeared reduced) who presented w/ volume overload w/ worsening LE edema over the past 2 months. Pt also in AFib w/ RVR. EP consulted (Dr. Mono) and will address further rhythm management once patient euvolemic. Lasix uptitrated to Lasix 40mg IV TID. TTE pending.

## 2022-12-21 NOTE — DIETITIAN INITIAL EVALUATION ADULT - PERSON TAUGHT/METHOD
Pt/visitor: pt had declined education as her house keeper does cooking, visitor at bedside agreeable for education to relay to house keepers./verbal instruction/written material/teach back - (Patient repeats in own words)

## 2022-12-22 DIAGNOSIS — I50.32 CHRONIC DIASTOLIC (CONGESTIVE) HEART FAILURE: ICD-10-CM

## 2022-12-22 DIAGNOSIS — D50.9 IRON DEFICIENCY ANEMIA, UNSPECIFIED: ICD-10-CM

## 2022-12-22 LAB
ALBUMIN SERPL ELPH-MCNC: 3.7 G/DL — SIGNIFICANT CHANGE UP (ref 3.3–5)
ALP SERPL-CCNC: 76 U/L — SIGNIFICANT CHANGE UP (ref 40–120)
ALT FLD-CCNC: 32 U/L — SIGNIFICANT CHANGE UP (ref 10–45)
ANION GAP SERPL CALC-SCNC: 10 MMOL/L — SIGNIFICANT CHANGE UP (ref 5–17)
AST SERPL-CCNC: 35 U/L — SIGNIFICANT CHANGE UP (ref 10–40)
BILIRUB DIRECT SERPL-MCNC: 0.4 MG/DL — HIGH (ref 0–0.3)
BILIRUB INDIRECT FLD-MCNC: 1.3 MG/DL — HIGH (ref 0.2–1)
BILIRUB SERPL-MCNC: 1.7 MG/DL — HIGH (ref 0.2–1.2)
BILIRUB SERPL-MCNC: 1.8 MG/DL — HIGH (ref 0.2–1.2)
BUN SERPL-MCNC: 13 MG/DL — SIGNIFICANT CHANGE UP (ref 7–23)
CALCIUM SERPL-MCNC: 8.8 MG/DL — SIGNIFICANT CHANGE UP (ref 8.4–10.5)
CHLORIDE SERPL-SCNC: 92 MMOL/L — LOW (ref 96–108)
CO2 SERPL-SCNC: 30 MMOL/L — SIGNIFICANT CHANGE UP (ref 22–31)
CREAT ?TM UR-MCNC: 26 MG/DL — SIGNIFICANT CHANGE UP
CREAT SERPL-MCNC: 0.56 MG/DL — SIGNIFICANT CHANGE UP (ref 0.5–1.3)
EGFR: 84 ML/MIN/1.73M2 — SIGNIFICANT CHANGE UP
GLUCOSE SERPL-MCNC: 177 MG/DL — HIGH (ref 70–99)
HCT VFR BLD CALC: 37 % — SIGNIFICANT CHANGE UP (ref 34.5–45)
HGB BLD-MCNC: 11.6 G/DL — SIGNIFICANT CHANGE UP (ref 11.5–15.5)
MAGNESIUM SERPL-MCNC: 1.7 MG/DL — SIGNIFICANT CHANGE UP (ref 1.6–2.6)
MCHC RBC-ENTMCNC: 24.1 PG — LOW (ref 27–34)
MCHC RBC-ENTMCNC: 31.4 GM/DL — LOW (ref 32–36)
MCV RBC AUTO: 76.9 FL — LOW (ref 80–100)
NRBC # BLD: 0 /100 WBCS — SIGNIFICANT CHANGE UP (ref 0–0)
OSMOLALITY UR: 324 MOSM/KG — SIGNIFICANT CHANGE UP (ref 300–900)
PLATELET # BLD AUTO: 211 K/UL — SIGNIFICANT CHANGE UP (ref 150–400)
POTASSIUM SERPL-MCNC: 3.8 MMOL/L — SIGNIFICANT CHANGE UP (ref 3.5–5.3)
POTASSIUM SERPL-SCNC: 3.8 MMOL/L — SIGNIFICANT CHANGE UP (ref 3.5–5.3)
POTASSIUM UR-SCNC: 23 MMOL/L — SIGNIFICANT CHANGE UP
PROT ?TM UR-MCNC: 9 MG/DL — SIGNIFICANT CHANGE UP (ref 0–12)
PROT SERPL-MCNC: 6.9 G/DL — SIGNIFICANT CHANGE UP (ref 6–8.3)
PROT/CREAT UR-RTO: 0.3 RATIO — HIGH (ref 0–0.2)
RBC # BLD: 4.81 M/UL — SIGNIFICANT CHANGE UP (ref 3.8–5.2)
RBC # FLD: 16.5 % — HIGH (ref 10.3–14.5)
SODIUM SERPL-SCNC: 132 MMOL/L — LOW (ref 135–145)
SODIUM UR-SCNC: 94 MMOL/L — SIGNIFICANT CHANGE UP
UUN UR-MCNC: 241 MG/DL — SIGNIFICANT CHANGE UP
WBC # BLD: 9.09 K/UL — SIGNIFICANT CHANGE UP (ref 3.8–10.5)
WBC # FLD AUTO: 9.09 K/UL — SIGNIFICANT CHANGE UP (ref 3.8–10.5)

## 2022-12-22 PROCEDURE — 99233 SBSQ HOSP IP/OBS HIGH 50: CPT

## 2022-12-22 RX ORDER — IRON SUCROSE 20 MG/ML
300 INJECTION, SOLUTION INTRAVENOUS
Refills: 0 | Status: COMPLETED | OUTPATIENT
Start: 2022-12-22 | End: 2022-12-24

## 2022-12-22 RX ORDER — METOPROLOL TARTRATE 50 MG
25 TABLET ORAL ONCE
Refills: 0 | Status: COMPLETED | OUTPATIENT
Start: 2022-12-22 | End: 2022-12-22

## 2022-12-22 RX ORDER — METOPROLOL TARTRATE 50 MG
50 TABLET ORAL EVERY 6 HOURS
Refills: 0 | Status: DISCONTINUED | OUTPATIENT
Start: 2022-12-22 | End: 2022-12-23

## 2022-12-22 RX ORDER — SENNA PLUS 8.6 MG/1
2 TABLET ORAL AT BEDTIME
Refills: 0 | Status: DISCONTINUED | OUTPATIENT
Start: 2022-12-22 | End: 2022-12-24

## 2022-12-22 RX ORDER — ALPRAZOLAM 0.25 MG
0.25 TABLET ORAL DAILY
Refills: 0 | Status: DISCONTINUED | OUTPATIENT
Start: 2022-12-22 | End: 2022-12-29

## 2022-12-22 RX ORDER — METOPROLOL TARTRATE 50 MG
5 TABLET ORAL ONCE
Refills: 0 | Status: COMPLETED | OUTPATIENT
Start: 2022-12-22 | End: 2022-12-22

## 2022-12-22 RX ORDER — SODIUM CHLORIDE 0.65 %
1 AEROSOL, SPRAY (ML) NASAL DAILY
Refills: 0 | Status: DISCONTINUED | OUTPATIENT
Start: 2022-12-22 | End: 2023-01-03

## 2022-12-22 RX ORDER — POTASSIUM CHLORIDE 20 MEQ
20 PACKET (EA) ORAL ONCE
Refills: 0 | Status: COMPLETED | OUTPATIENT
Start: 2022-12-22 | End: 2022-12-22

## 2022-12-22 RX ORDER — FUROSEMIDE 40 MG
40 TABLET ORAL EVERY 8 HOURS
Refills: 0 | Status: DISCONTINUED | OUTPATIENT
Start: 2022-12-22 | End: 2022-12-24

## 2022-12-22 RX ORDER — MAGNESIUM OXIDE 400 MG ORAL TABLET 241.3 MG
800 TABLET ORAL ONCE
Refills: 0 | Status: COMPLETED | OUTPATIENT
Start: 2022-12-22 | End: 2022-12-22

## 2022-12-22 RX ADMIN — MAGNESIUM OXIDE 400 MG ORAL TABLET 800 MILLIGRAM(S): 241.3 TABLET ORAL at 12:48

## 2022-12-22 RX ADMIN — Medication 40 MILLIGRAM(S): at 15:04

## 2022-12-22 RX ADMIN — Medication 40 MILLIGRAM(S): at 22:28

## 2022-12-22 RX ADMIN — Medication 100 MICROGRAM(S): at 05:14

## 2022-12-22 RX ADMIN — Medication 20 MILLIEQUIVALENT(S): at 12:48

## 2022-12-22 RX ADMIN — Medication 50 MILLIGRAM(S): at 12:48

## 2022-12-22 RX ADMIN — Medication 5 MILLIGRAM(S): at 03:03

## 2022-12-22 RX ADMIN — SENNA PLUS 2 TABLET(S): 8.6 TABLET ORAL at 22:29

## 2022-12-22 RX ADMIN — ATORVASTATIN CALCIUM 20 MILLIGRAM(S): 80 TABLET, FILM COATED ORAL at 22:23

## 2022-12-22 RX ADMIN — VALSARTAN 40 MILLIGRAM(S): 80 TABLET ORAL at 05:15

## 2022-12-22 RX ADMIN — IRON SUCROSE 132.5 MILLIGRAM(S): 20 INJECTION, SOLUTION INTRAVENOUS at 15:04

## 2022-12-22 RX ADMIN — Medication 25 MILLIGRAM(S): at 10:32

## 2022-12-22 RX ADMIN — Medication 0.25 MILLIGRAM(S): at 00:02

## 2022-12-22 RX ADMIN — APIXABAN 5 MILLIGRAM(S): 2.5 TABLET, FILM COATED ORAL at 12:48

## 2022-12-22 RX ADMIN — Medication 40 MILLIGRAM(S): at 03:33

## 2022-12-22 RX ADMIN — Medication 25 MILLIGRAM(S): at 05:14

## 2022-12-22 RX ADMIN — Medication 50 MILLIGRAM(S): at 18:18

## 2022-12-22 NOTE — PROGRESS NOTE ADULT - SUBJECTIVE AND OBJECTIVE BOX
HPI: 89F PMHx HTN, HLD, Hypothyroidism, TIA, s/p AVR, AFib (on Eliquis, s/p ablation > 10yrs ago), CHF (per outpatient bedside echo EF appears reduced) who presented to [facility] ED on 12/20/22 endorsing worsening LE edema over the past 2 months. Pt's outpatient cardiologist, Dr. David, sent her to [facility], at which time a bedside limited echo was done showing a possibly reduced LVEF. Pt found to have volume overload peripherally and in AFib w/ RVR (HR 100s). Pt denies CP, SOB, palpitations, dizziness, orthopnea, N/V, fever/chills, syncope, PND.     VITALS: HR 100s, -120/66-90, SpO2 98% on RA, RR 18, T 97.5F.   LABS: WBC 6.95, Hgb/Hct 11.5/36.3, Plt Cnt 196, Na 132, K 4.5, BUN/Cr 13/0.61, Trop T 0.01, CK 91, CKMB 39, BNP 3126.     TREATMENT IN ED: Lasix 40mg IV x1.     Patient admitted to cardiology for heart failure exacerbation and atrial fibrillation.  (20 Dec 2022 18:00)    PAST MEDICAL & SURGICAL HISTORY:  HTN (hypertension)  HLD (hyperlipidemia)  Congestive heart failure (CHF)  TIA (transient ischemic attack)  Hypothyroidism  Atrial fibrillation    PREVIOUS DIAGNOSTIC TESTING:    [ ] Echocardiogram:  [ ] Stress Test:  [ ] Catheterization: 	    FAMILY HISTORY:    Social History:    ALLERGIES/INTOLERANCES:  No Known Allergies    HOME MEDICATIONS:    INPATIENT MEDICATIONS:  metoprolol tartrate 25 milliGRAM(s) Oral every 6 hours  valsartan 40 milliGRAM(s) Oral daily  apixaban 5 milliGRAM(s) Oral every 12 hours  atorvastatin 20 milliGRAM(s) Oral at bedtime  influenza  Vaccine (HIGH DOSE) 0.7 milliLiter(s) IntraMuscular once  levothyroxine 100 MICROGram(s) Oral daily    REVIEW OF SYSTEMS: See HPI     PHYSICAL EXAM:  Constitutional: resting comfortably in bed; NAD  Head: NC/AT  Eyes: PERRL, EOMI, clear conjunctiva  ENT: no nasal discharge; uvula midline, no oropharyngeal erythema or exudates; MMM  Neck: supple; unable to assess JVD d/t neck habitus. No thyromegaly  Respiratory: bibasilar crackles, no retractions  Cardiac: +S1/S2; irregular; no M/R/G;  Gastrointestinal: soft, NT/ND; no rebound or guarding; +BSx4  Extremities: WWP, no clubbing or cyanosis; significant bilateral pitting edema, wrapped in ACE wraps  Musculoskeletal: NROM x4; no joint swelling, tenderness or erythema  Vascular: 2+ radial, femoral, DP/PT pulses B/L  Dermatologic: skin warm, dry and intact; no rashes, wounds, or scars  Neurologic: AAOx3; CNII-XII grossly intact; no focal deficits  Psychiatric: affect and characteristics of appearance, verbalizations, behaviors are appropriate  T(C): 36.1 (12-21-22 @ 08:37), Max: 36.6 (12-20-22 @ 22:07)  HR: 126 (12-21-22 @ 08:39) (85 - 134)  BP: 118/72 (12-21-22 @ 08:39) (107/66 - 136/75)  RR: 18 (12-21-22 @ 08:39) (15 - 20)  SpO2: 98% (12-21-22 @ 08:39) (93% - 98%)  Wt(kg): --    I&O's Summary  20 Dec 2022 07:01  -  21 Dec 2022 07:00  --------------------------------------------------------  IN: 0 mL / OUT: 1950 mL / NET: -1950 mL    21 Dec 2022 07:01  -  21 Dec 2022 12:10  --------------------------------------------------------  IN: 180 mL / OUT: 0 mL / NET: 180 mL    TELEMETRY: 	      ECG:  	  	  LABS:                        11.4   6.51  )-----------( 188      ( 21 Dec 2022 06:19 )             36.7     12-21    133<L>  |  94<L>  |  13  ----------------------------<  111<H>  4.0   |  28  |  0.55    Ca    9.0      21 Dec 2022 06:19  Mg     1.9     12-21    TPro  6.5  /  Alb  3.7  /  TBili  1.6<H>  /  DBili  x   /  AST  34  /  ALT  32  /  AlkPhos  66  12-21    Lipid Profile:   HgA1c:   TSH: Thyroid Stimulating Hormone, Serum: 2.830 uIU/mL (12-21 @ 06:19)    CARDIAC MARKERS:    proBNP: Serum Pro-Brain Natriuretic Peptide: 3126 pg/mL (12-20 @ 17:07)    RADIOLOGY:

## 2022-12-22 NOTE — PROGRESS NOTE ADULT - PROBLEM SELECTOR PLAN 5
--SBP 110s-130s.   --CONT: Valsartan 40mg PO QD, Lopressor 25mg PO q6hrs, Lasix 40mg IV TID. --SBP 110s-140's  --CONT: Lopressor 25mg PO q6hrs, Lasix 40mg IV TID.  -- HOLD: Valsartan 40mg PO QD to make room for rate control and diuresis --SBP 110s-140's  --CONT: Lopressor 25mg PO q6hrs, Lasix 40mg IV TID.  --HOLD: Valsartan 40mg PO QD to make room for rate control and diuresis --CONT: Levothyroxine 100mcg PO QD.

## 2022-12-22 NOTE — PROGRESS NOTE ADULT - ASSESSMENT
89yo F w/ PMHx of HTN, HLD, Hypothyroidism, TIA, s/p AVR, AFib (on Eliquis, s/p ablation > 10yrs ago), CHF (per outpt bedside echo EF appeared reduced) who presented w/ volume overload w/ worsening LE edema over the past 2 months. Pt also in AFib w/ RVR. EP consulted (Dr. Moon) and will address further rhythm management once patient euvolemic. Lasix uptitrated to Lasix 40mg IV TID. TTE pending.      89yo F w/ PMHx of HTN, HLD, Hypothyroidism, TIA, s/p AVR, AFib (on Eliquis, s/p ablation > 10yrs ago), chronic dCHF (EF 55-60% by ECHO 12/21/22) who presented w/ volume overload w/ worsening LE edema over the past 2 months. Pt also in AFib w/ RVR. EP consulted and will address further rhythm management once patient euvolemic. Pt being diuresed with Lasix 40mg IV TID. Pending PT eval when better rate controlled.      89 yo F w/ PMHx of HTN, HLD, Hypothyroidism, TIA, s/p AVR, AFib (on Eliquis, s/p ablation > 10yrs ago), chronic dCHF (EF 55-60% by ECHO 12/21/22) who presented w/ volume overload w/ worsening LE edema over the past 2 months as well as AFib w/ RVR. EP consulted and will address further rhythm management once patient euvolemic. Pt being diuresed with Lasix 40mg IV TID. Pending PT eval when better rate controlled.

## 2022-12-22 NOTE — PROGRESS NOTE ADULT - PROBLEM SELECTOR PLAN 1
--PMHx of CHF takes Lasix 20mg PO BID at home; LE edema over 2mos; no respiratory symptoms.   --BNP 3126; 4+ LE pitting edema B/L, bilateral rales on lung exam.   --TTE ordered - f/u results.   --CONT: Lasix 40mg IV TID.   --I/Os: -1950cc/12hrs (after lasix 40mg IV x1).   --PLAN: IV diuresis; TTE for structural eval; continue w/ ACE wraps. --PMHx of CHF takes Lasix 20mg PO BID at home; LE edema over 2mos; no respiratory symptoms.   --BNP 3126; 4+ LE pitting edema B/L to the thighs, bilateral rales on initial exam. Currently 2+ pitting edema  --TTE (12/21/22): LVEF 55-60%, mod-severe sLVH, ESTER, mild AR, mod MR, mod-severe TR, PASP 79mmHg, trivial pericardial effusion, L pleural effusion.   --CONT: Lasix 40mg IV TID.   --I/Os: Net neg 3.6 L   --PLAN: IV diuresis, continue w/ ACE wraps.

## 2022-12-22 NOTE — PROGRESS NOTE ADULT - PROBLEM SELECTOR PLAN 2
--AFib w/ RVR w/ HR in 110s-120s.   --Reports ablation w/ Dr. Moon > 10yrs ago.   --HOLD home Atenolol 25mg PO QD.   --CONT: Eliquis 5mg PO BID, Lopressor 25mg PO q6hrs.   --PLAN: EP consulted - will discuss further rhythm management once patient more euvolemic. --AFib w/ RVR w/ HR in 110s-120s.   --Reports ablation w/ Dr. Moon > 10yrs ago.   --HOLD home Atenolol 25mg PO QD.   --CONT: Eliquis 5mg PO BID, Lopressor increased to 50mg PO q6hrs.   -- Received Lopressor 5mg IV x 1 12/22 overnight for -150's  --PLAN: EP consulted - will discuss further rhythm management once patient more euvolemic. --AFib w/ RVR w/ HR in 110s-120s.   --Reports ablation w/ Dr. Moon > 10yrs ago.   --HOLD home Atenolol 25mg PO QD.   --CONT: Eliquis 5mg PO BID, Lopressor increased to 50mg PO q6hrs.   --Received Lopressor 5mg IV x 1 12/22 overnight for -150's  --PLAN: EP consulted - will discuss further rhythm management once patient more euvolemic.

## 2022-12-22 NOTE — PROGRESS NOTE ADULT - PROBLEM SELECTOR PLAN 7
--Chol 125, TG 68, HDL 59, LDL 52.   --CONT: Atorvastatin 20mg PO QHS      DVT ppx: Eliquis  Dispo: pending euvolemia/rate control  PT Eval: pending  Full code

## 2022-12-22 NOTE — PROGRESS NOTE ADULT - ASSESSMENT
89-year-old female with a PMHx of HTN, HLD, hypothyroidism, TIA, history of AVR, Afib (s/p remote ablation, on Eliquis) and CHF who presented with acute of chronic decompensated heart failure and Afib with RVR.        #Congestive Heart Failure    -further management as per primary team     -TTE (12/21): EF 55-60%, moderate to severe LVH, moderate to severe TR and pHTN   -further diuresis as per primary team, currently on IV lasix 40mg q8hrs   -strict I/Os and fluid restriction     -consider palliative care consult pending clinical course       #Atrial Fibrillation with RVR    -further management as per primary team and EP   -continue with Eliquis 5mg BID    -currently on metoprolol tartrate 50mg q6hrs (home atenolol 25mg daily held)     -EP consulted, will consider cardioversion when euvolemic      #Hypothyroidism     -continue with levothyroxine 100mcg daily    -TSH and T4 within normal limits       #Elevated Bilirubin     -unclear etiology of minor elevation, no signs or symptoms of biliary pathology     -obtain indirect bilirubin and RUQ US       #Microcytic Anemia     -iron studies most consistent with CARRILLO   -can start IV iron while inpatient and consider starting ferrous sulfate 325mg daily upon discharge      #Hyponatremia     -mild and asymptomatic, very likely hypervolemic in setting of ADHF    -obtain urine osm and urine electrolytes (with urine urea given diuresis)       #Pre-Diabetes (A1c 6.4%)    -no history of DMII and is not on any medications    -outpatient PCP follow up      #HTN   -continue with valsartan 40mg daily       #HLD    -continue with atorvastatin 20mg daily       DVT PPx: Eliquis     Dispo: pending PT evaluation

## 2022-12-22 NOTE — GOALS OF CARE CONVERSATION - ADVANCED CARE PLANNING - CONVERSATION/DISCUSSION
Diagnosis/Prognosis/MOLST Discussed/Treatment Options Post-Care Instructions: I reviewed with the patient in detail post-care instructions. Patient is to wear sunprotection, and avoid picking at any of the treated lesions. Pt may apply Vaseline to crusted or scabbing areas. Detail Level: Detailed Render Post-Care Instructions In Note?: no Consent: The patient's consent was obtained including but not limited to risks of crusting, scabbing, blistering, scarring, darker or lighter pigmentary change, recurrence, incomplete removal and infection. Show Applicator Variable?: Yes Duration Of Freeze Thaw-Cycle (Seconds): 0

## 2022-12-22 NOTE — PROGRESS NOTE ADULT - PROBLEM SELECTOR PLAN 3
--Total bilirubin 1.6.   --Medicine team following requesting RUQ US. --Total bilirubin 1.6.   --Medicine team recommending RUQ US, f/u indirect bilirubin      # Urinary retention  - Pt retaining urine s/p straight cath x 2. Continues to intermittently be retaining urine.   - Continue to monitor, will place soto if retains again today

## 2022-12-22 NOTE — PROGRESS NOTE ADULT - SUBJECTIVE AND OBJECTIVE BOX
INCOMPLETE    S: Pt seen and examined bedside.  Patient denies C/P, SOB, N/V, dizziness, palpitations, and diaphoresis.  Pt denies fever/chills, dysuria, abdominal pain, diarrhea, and cough  12 Point ROS otherwise negative except as per HPI/subjective.     O: Vital Signs Last 24 Hrs  T(C): 36.6 (22 Dec 2022 08:58), Max: 36.9 (22 Dec 2022 05:35)  T(F): 97.9 (22 Dec 2022 08:58), Max: 98.5 (22 Dec 2022 05:35)  HR: 121 (22 Dec 2022 08:34) (110 - 132)  BP: 126/77 (22 Dec 2022 08:34) (116/88 - 142/78)  BP(mean): 96 (21 Dec 2022 17:00) (90 - 96)  RR: 16 (22 Dec 2022 08:34) (16 - 18)  SpO2: 95% (22 Dec 2022 08:34) (94% - 98%)    Parameters below as of 22 Dec 2022 08:34  Patient On (Oxygen Delivery Method): nasal cannula  O2 Flow (L/min): 2      PHYSICAL EXAM:  GEN: NAD  HEENT: No JVD  PULM:  CTA B/L  CARD:  RRR, S1 and S2   ABD: +BS, NT, soft/ND	  EXT: No Edema B/L LE  NEURO: A+Ox3, no focal deficit  PSYCH: Mood Appropriate    LABS:                        11.6   9.09  )-----------( 211      ( 22 Dec 2022 05:30 )             37.0     12-22    132<L>  |  92<L>  |  13  ----------------------------<  177<H>  3.8   |  30  |  0.56    Ca    8.8      22 Dec 2022 05:30  Mg     1.7     12-22    TPro  6.9  /  Alb  3.7  /  TBili  1.8<H>  /  DBili  x   /  AST  35  /  ALT  32  /  AlkPhos  76  12-22    PT/INR - ( 20 Dec 2022 17:07 )   PT: 20.0 sec;   INR: 1.67          PTT - ( 20 Dec 2022 17:07 )  PTT:34.9 sec  Troponin T, Serum: 0.01 ng/mL (12-20-22 @ 17:07)      12-21 @ 07:01  -  12-22 @ 07:00  --------------------------------------------------------  IN: 600 mL / OUT: 2200 mL / NET: -1600 mL    12-22 @ 07:01  -  12-22 @ 12:10  --------------------------------------------------------  IN: 180 mL / OUT: 250 mL / NET: -70 mL      Daily     Daily    S: Pt seen and examined bedside. Pt reports she is feeling well this morning, denies any complaints.   Patient denies C/P, SOB, N/V, dizziness, palpitations, and diaphoresis.  Pt denies fever/chills, dysuria, abdominal pain, diarrhea, and cough  12 Point ROS otherwise negative except as per HPI/subjective.     O: Vital Signs Last 24 Hrs  T(C): 36.6 (22 Dec 2022 08:58), Max: 36.9 (22 Dec 2022 05:35)  T(F): 97.9 (22 Dec 2022 08:58), Max: 98.5 (22 Dec 2022 05:35)  HR: 121 (22 Dec 2022 08:34) (110 - 132)  BP: 126/77 (22 Dec 2022 08:34) (116/88 - 142/78)  BP(mean): 96 (21 Dec 2022 17:00) (90 - 96)  RR: 16 (22 Dec 2022 08:34) (16 - 18)  SpO2: 95% (22 Dec 2022 08:34) (94% - 98%)    Parameters below as of 22 Dec 2022 08:34  Patient On (Oxygen Delivery Method): nasal cannula  O2 Flow (L/min): 2      PHYSICAL EXAM:  Appearance: Elderly female sitting in bed eating breakfast, appears comfortable on 2L NC  HEENT:   Normal oral mucosa, PERRL, EOMI	  Neck: Supple, - JVD  Cardiovascular: Normal S1 S2, No JVD, No murmurs; tachycardiac  Respiratory: diminished lung sounds, no rales   Gastrointestinal:  Soft, Non-tender, + BS	  Skin: No rashes, No ecchymoses, No cyanosis  Extremities: Normal range of motion, No clubbing, cyanosis. 2+ pitting edema B/L LE up to thighs  Vascular: unable to palpate 2/2 pitting edema  Neurologic: Non-focal  Psychiatry: A & O x 3, Mood & affect appropriate    LABS:                        11.6   9.09  )-----------( 211      ( 22 Dec 2022 05:30 )             37.0     12-22    132<L>  |  92<L>  |  13  ----------------------------<  177<H>  3.8   |  30  |  0.56    Ca    8.8      22 Dec 2022 05:30  Mg     1.7     12-22    TPro  6.9  /  Alb  3.7  /  TBili  1.8<H>  /  DBili  x   /  AST  35  /  ALT  32  /  AlkPhos  76  12-22    PT/INR - ( 20 Dec 2022 17:07 )   PT: 20.0 sec;   INR: 1.67          PTT - ( 20 Dec 2022 17:07 )  PTT:34.9 sec  Troponin T, Serum: 0.01 ng/mL (12-20-22 @ 17:07)      12-21 @ 07:01  -  12-22 @ 07:00  --------------------------------------------------------  IN: 600 mL / OUT: 2200 mL / NET: -1600 mL    12-22 @ 07:01  -  12-22 @ 12:10  --------------------------------------------------------  IN: 180 mL / OUT: 250 mL / NET: -70 mL

## 2022-12-22 NOTE — PROGRESS NOTE ADULT - NS ATTEND AMEND GEN_ALL_CORE FT
89F w/ Essential  HTN, HLD, Hypothyroidism, hx TIA, hx AVR, AFib (on Eliquis, s/p ablation > 10yrs ago), Chronic CHF of unknown specificity who p/w acute on chronic decompensated CHF and AFib w/ RVR. Volume status currently improving on TID IV diuresis.   Physical Exam notable for: elderly patient laying in bed in NAD, elevated neck veins, irreg irreg rhythm, tachycardic rate, diminished breath sounds in bases, 2+ KENYA to thighs - significantly improved from prior exam, skin WWP, A&Ox3  TTE 12.21.22   1. Patient was tachycardic during the study.   2. Normal left ventricular systolic function. Left ventricular ejection fraction is 55-60%.   3. Presence of atrial fibrillation lends to [id number] variability in the ejection fraction.   4. Moderate to severe symmetric left ventricular hypertrophy.   5. Normal right ventricular size and systolic function.   6. Biatrial enlargement.   7. 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 The leaflets appear thickened. The peak transvalvular velocity is 3.08 m/s, the mean transvalvular gradient is 22.00 mmHg, and the LVOT/AV velocity ratio is 0.33. The values are borderline elevated for aortic bioprosthesis.   8. There is mild aortic regurgitation.   9. There is severe mitral annular calcification. The mean transvalvular gradient is 5.00 mmHg at a heart rate of 114 bpm.  10. Moderate mitral regurgitation.  11. Moderate-to-severe tricuspid regurgitation.  12. Pulmonary hypertension present, pulmonary artery systolic pressure is 79 mmHg.  13. Trivial pericardial effusion without echocardiographic evidence of cardiac tamponade physiology.  14. Left pleural effusion.  15. No prior echo is available for comparison.    Plan for:  Diurese with IV Lasix 40 TID, goal net neg 2-3L/day  Lopressor up titrate to 50mg q6hr for rate control, Eliquis 5 BID for afib cva risk reduction  Home Valsartan held to allow for up titration of Lopressor and aggressive IV diuresis  EP following for rate/rhythm control, considering DCCV when euvolemic  Repeat TTE when diuresed to dry state AND in NSR, wait until after DCCV to repeat echo  ACE wrap lower extremities to the thighs bilaterally  PT consult remains pending  Future planning: patient to have 1wk f/u appt made with Cardiologist for quality improvement CHF readmission risk reduction  Kit Arciniega M.D.  Cardiology Attending. 89F w/ Essential  HTN, HLD, Hypothyroidism, hx TIA, hx AVR, AFib (on Eliquis, s/p ablation > 10yrs ago), Chronic CHFpEF who p/w acute on chronic decompensated diastolic CHF and AFib w/ RVR. Volume status currently improving on TID IV diuresis.   Physical Exam notable for: elderly patient laying in bed in NAD, elevated neck veins, irreg irreg rhythm, tachycardic rate, diminished breath sounds in bases, 2+ KENYA to thighs - significantly improved from prior exam, skin WWP, A&Ox3  TTE 12.21.22   1. Patient was tachycardic during the study.   2. Normal left ventricular systolic function. Left ventricular ejection fraction is 55-60%.   3. Presence of atrial fibrillation lends to [id number] variability in the ejection fraction.   4. Moderate to severe symmetric left ventricular hypertrophy.   5. Normal right ventricular size and systolic function.   6. Biatrial enlargement.   7. 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 The leaflets appear thickened. The peak transvalvular velocity is 3.08 m/s, the mean transvalvular gradient is 22.00 mmHg, and the LVOT/AV velocity ratio is 0.33. The values are borderline elevated for aortic bioprosthesis.   8. There is mild aortic regurgitation.   9. There is severe mitral annular calcification. The mean transvalvular gradient is 5.00 mmHg at a heart rate of 114 bpm.  10. Moderate mitral regurgitation.  11. Moderate-to-severe tricuspid regurgitation.  12. Pulmonary hypertension present, pulmonary artery systolic pressure is 79 mmHg.  13. Trivial pericardial effusion without echocardiographic evidence of cardiac tamponade physiology.  14. Left pleural effusion.  15. No prior echo is available for comparison.    Plan for:  Diurese with IV Lasix 40 TID, goal net neg 2-3L/day  Lopressor up titrate to 50mg q6hr for rate control, Eliquis 5 BID for afib cva risk reduction  Home Valsartan held to allow for up titration of Lopressor and aggressive IV diuresis  EP following for rate/rhythm control, considering DCCV when euvolemic  Repeat TTE when diuresed to dry state AND in NSR, wait until after DCCV to repeat echo  ACE wrap lower extremities to the thighs bilaterally  PT consult remains pending  Future planning: patient to have 1wk f/u appt made with Cardiologist for quality improvement CHF readmission risk reduction  Kit Arciniega M.D.  Cardiology Attending.

## 2022-12-22 NOTE — CHART NOTE - NSCHARTNOTEFT_GEN_A_CORE
Patient in Afib with RVR, with HR persistently 130s-150s. Patient remains asymptomatic, given Lopressor 5mg IV x 1 dose with improvement in HR. Will be administered standing Lopressor 25mg PO at 6AM.

## 2022-12-22 NOTE — PROGRESS NOTE ADULT - PROBLEM SELECTOR PLAN 4
--CONT: Levothyroxine 100mcg PO QD. - Iron studies most c/w CARRILLO   - Started IV iron per Medicine recs while inpatient, consider starting ferrous sulfate 325mg daily upon discharge

## 2022-12-22 NOTE — GOALS OF CARE CONVERSATION - ADVANCED CARE PLANNING - CONVERSATION DETAILS
Discussed with patient and her daughter (Lauryn Lamas - designated HCP). Patient and daughter are unsure if they have officially filled out a MOLST, but state that at this time pt Nolvia Lamas remains FULL CODE with no treatment restrictions. They state that they will continue the discussion on their own.

## 2022-12-22 NOTE — PROGRESS NOTE ADULT - SUBJECTIVE AND OBJECTIVE BOX
Subjective:  Patient had an episode of worsening Afib with RVR overnight, s/p 1 dose of IV metoprolol.  Today, patient is overall doing well.  Denies HA, CP, SOB, abdominal pain, nausea, vomiting, fever, chills or diarrhea.  States he is having trouble with bowel movements as it is difficult to go on the bed pan and has associated bloating.  Tolerating PO intake without issue.      Objective:   Vital Signs:  T(C): 36.6 (22 Dec 2022 08:58), Max: 36.9 (22 Dec 2022 05:35)  T(F): 97.9 (22 Dec 2022 08:58), Max: 98.5 (22 Dec 2022 05:35)  HR: 121 (22 Dec 2022 08:34) (110 - 132)  BP: 126/77 (22 Dec 2022 08:34) (116/88 - 142/78)  BP(mean): 96 (21 Dec 2022 17:00) (90 - 96)  RR: 16 (22 Dec 2022 08:34) (16 - 18)  SpO2: 95% (22 Dec 2022 08:34) (94% - 98%)    Parameters below as of 22 Dec 2022 08:34  Patient On (Oxygen Delivery Method): nasal cannula  O2 Flow (L/min): 2    Physical Exam:  -Gen: NAD, resting in bed, pleasant  -HEENT: EOMI, PERRL, no JVD, no bruits  -CV: irregular and tachycardic, no murmurs appreciated   -Lungs: bibasilar crackles, normal respiratory effort on NC  -Ab: mild distension, soft, NT, normal BS  -Ext: significant bilateral pitting edema, wrapped in ACE wraps  -Neuro: A&O x 3, no focal deficits     Labs:                        11.6   9.09  )-----------( 211      ( 22 Dec 2022 05:30 )             37.0       12-22    132<L>  |  92<L>  |  13  ----------------------------<  177<H>  3.8   |  30  |  0.56    Ca    8.8      22 Dec 2022 05:30  Mg     1.7     12-22    TPro  6.9  /  Alb  3.7  /  TBili  1.8<H>  /  DBili  x   /  AST  35  /  ALT  32  /  AlkPhos  76  12-22    Medications:  MEDICATIONS  (STANDING):  apixaban 5 milliGRAM(s) Oral every 12 hours  atorvastatin 20 milliGRAM(s) Oral at bedtime  furosemide   Injectable 40 milliGRAM(s) IV Push every 8 hours  influenza  Vaccine (HIGH DOSE) 0.7 milliLiter(s) IntraMuscular once  levothyroxine 100 MICROGram(s) Oral daily  metoprolol tartrate 50 milliGRAM(s) Oral every 6 hours  valsartan 40 milliGRAM(s) Oral daily    MEDICATIONS  (PRN):

## 2022-12-22 NOTE — PROGRESS NOTE ADULT - PROBLEM SELECTOR PLAN 6
--Chol 125, TG 68, HDL 59, LDL 52.   --CONT: Atorvastatin 20mg PO QHS      DVT ppx: Eliquis  Dispo: pending euvolemic / completion of work up  PT Eval: pending --Chol 125, TG 68, HDL 59, LDL 52.   --CONT: Atorvastatin 20mg PO QHS      DVT ppx: Eliquis  Dispo: pending euvolemia/rate control  PT Eval: pending  Full code --SBP 110s-140's  --CONT: Lopressor 25mg PO q6hrs, Lasix 40mg IV TID.  --HOLD: Valsartan 40mg PO QD to make room for rate control and diuresis

## 2022-12-23 LAB
ANION GAP SERPL CALC-SCNC: 10 MMOL/L — SIGNIFICANT CHANGE UP (ref 5–17)
BASOPHILS # BLD AUTO: 0.02 K/UL — SIGNIFICANT CHANGE UP (ref 0–0.2)
BASOPHILS NFR BLD AUTO: 0.2 % — SIGNIFICANT CHANGE UP (ref 0–2)
BUN SERPL-MCNC: 14 MG/DL — SIGNIFICANT CHANGE UP (ref 7–23)
CALCIUM SERPL-MCNC: 8.6 MG/DL — SIGNIFICANT CHANGE UP (ref 8.4–10.5)
CHLORIDE SERPL-SCNC: 92 MMOL/L — LOW (ref 96–108)
CO2 SERPL-SCNC: 31 MMOL/L — SIGNIFICANT CHANGE UP (ref 22–31)
CREAT SERPL-MCNC: 0.5 MG/DL — SIGNIFICANT CHANGE UP (ref 0.5–1.3)
EGFR: 86 ML/MIN/1.73M2 — SIGNIFICANT CHANGE UP
EOSINOPHIL # BLD AUTO: 0.08 K/UL — SIGNIFICANT CHANGE UP (ref 0–0.5)
EOSINOPHIL NFR BLD AUTO: 0.9 % — SIGNIFICANT CHANGE UP (ref 0–6)
GLUCOSE SERPL-MCNC: 121 MG/DL — HIGH (ref 70–99)
HCT VFR BLD CALC: 36.9 % — SIGNIFICANT CHANGE UP (ref 34.5–45)
HGB BLD-MCNC: 11.4 G/DL — LOW (ref 11.5–15.5)
IMM GRANULOCYTES NFR BLD AUTO: 0.3 % — SIGNIFICANT CHANGE UP (ref 0–0.9)
LYMPHOCYTES # BLD AUTO: 1.04 K/UL — SIGNIFICANT CHANGE UP (ref 1–3.3)
LYMPHOCYTES # BLD AUTO: 12 % — LOW (ref 13–44)
MAGNESIUM SERPL-MCNC: 1.8 MG/DL — SIGNIFICANT CHANGE UP (ref 1.6–2.6)
MCHC RBC-ENTMCNC: 24.1 PG — LOW (ref 27–34)
MCHC RBC-ENTMCNC: 30.9 GM/DL — LOW (ref 32–36)
MCV RBC AUTO: 77.8 FL — LOW (ref 80–100)
MONOCYTES # BLD AUTO: 1.14 K/UL — HIGH (ref 0–0.9)
MONOCYTES NFR BLD AUTO: 13.1 % — SIGNIFICANT CHANGE UP (ref 2–14)
NEUTROPHILS # BLD AUTO: 6.39 K/UL — SIGNIFICANT CHANGE UP (ref 1.8–7.4)
NEUTROPHILS NFR BLD AUTO: 73.5 % — SIGNIFICANT CHANGE UP (ref 43–77)
NRBC # BLD: 0 /100 WBCS — SIGNIFICANT CHANGE UP (ref 0–0)
PLATELET # BLD AUTO: 196 K/UL — SIGNIFICANT CHANGE UP (ref 150–400)
POTASSIUM SERPL-MCNC: 3.6 MMOL/L — SIGNIFICANT CHANGE UP (ref 3.5–5.3)
POTASSIUM SERPL-SCNC: 3.6 MMOL/L — SIGNIFICANT CHANGE UP (ref 3.5–5.3)
RBC # BLD: 4.74 M/UL — SIGNIFICANT CHANGE UP (ref 3.8–5.2)
RBC # FLD: 16.1 % — HIGH (ref 10.3–14.5)
SODIUM SERPL-SCNC: 133 MMOL/L — LOW (ref 135–145)
WBC # BLD: 8.7 K/UL — SIGNIFICANT CHANGE UP (ref 3.8–10.5)
WBC # FLD AUTO: 8.7 K/UL — SIGNIFICANT CHANGE UP (ref 3.8–10.5)

## 2022-12-23 PROCEDURE — 99233 SBSQ HOSP IP/OBS HIGH 50: CPT

## 2022-12-23 RX ORDER — AMIODARONE HYDROCHLORIDE 400 MG/1
200 TABLET ORAL THREE TIMES A DAY
Refills: 0 | Status: DISCONTINUED | OUTPATIENT
Start: 2022-12-23 | End: 2022-12-25

## 2022-12-23 RX ORDER — SIMETHICONE 80 MG/1
80 TABLET, CHEWABLE ORAL ONCE
Refills: 0 | Status: COMPLETED | OUTPATIENT
Start: 2022-12-23 | End: 2022-12-23

## 2022-12-23 RX ORDER — POTASSIUM CHLORIDE 20 MEQ
40 PACKET (EA) ORAL ONCE
Refills: 0 | Status: COMPLETED | OUTPATIENT
Start: 2022-12-23 | End: 2022-12-23

## 2022-12-23 RX ORDER — METOPROLOL TARTRATE 50 MG
5 TABLET ORAL ONCE
Refills: 0 | Status: COMPLETED | OUTPATIENT
Start: 2022-12-23 | End: 2022-12-23

## 2022-12-23 RX ORDER — METOPROLOL TARTRATE 50 MG
100 TABLET ORAL
Refills: 0 | Status: DISCONTINUED | OUTPATIENT
Start: 2022-12-23 | End: 2022-12-24

## 2022-12-23 RX ADMIN — SENNA PLUS 2 TABLET(S): 8.6 TABLET ORAL at 23:06

## 2022-12-23 RX ADMIN — AMIODARONE HYDROCHLORIDE 200 MILLIGRAM(S): 400 TABLET ORAL at 13:04

## 2022-12-23 RX ADMIN — SIMETHICONE 80 MILLIGRAM(S): 80 TABLET, CHEWABLE ORAL at 18:59

## 2022-12-23 RX ADMIN — Medication 40 MILLIEQUIVALENT(S): at 09:21

## 2022-12-23 RX ADMIN — Medication 100 MILLIGRAM(S): at 18:01

## 2022-12-23 RX ADMIN — Medication 40 MILLIGRAM(S): at 13:04

## 2022-12-23 RX ADMIN — Medication 50 MILLIGRAM(S): at 09:21

## 2022-12-23 RX ADMIN — APIXABAN 5 MILLIGRAM(S): 2.5 TABLET, FILM COATED ORAL at 23:07

## 2022-12-23 RX ADMIN — ATORVASTATIN CALCIUM 20 MILLIGRAM(S): 80 TABLET, FILM COATED ORAL at 23:06

## 2022-12-23 RX ADMIN — IRON SUCROSE 132.5 MILLIGRAM(S): 20 INJECTION, SOLUTION INTRAVENOUS at 13:10

## 2022-12-23 RX ADMIN — Medication 40 MILLIGRAM(S): at 07:16

## 2022-12-23 RX ADMIN — APIXABAN 5 MILLIGRAM(S): 2.5 TABLET, FILM COATED ORAL at 00:31

## 2022-12-23 RX ADMIN — Medication 0.25 MILLIGRAM(S): at 00:43

## 2022-12-23 RX ADMIN — APIXABAN 5 MILLIGRAM(S): 2.5 TABLET, FILM COATED ORAL at 11:26

## 2022-12-23 RX ADMIN — AMIODARONE HYDROCHLORIDE 200 MILLIGRAM(S): 400 TABLET ORAL at 23:07

## 2022-12-23 RX ADMIN — Medication 5 MILLIGRAM(S): at 18:16

## 2022-12-23 RX ADMIN — Medication 40 MILLIGRAM(S): at 23:07

## 2022-12-23 RX ADMIN — Medication 50 MILLIGRAM(S): at 00:31

## 2022-12-23 RX ADMIN — Medication 100 MICROGRAM(S): at 07:15

## 2022-12-23 NOTE — PROGRESS NOTE ADULT - PROBLEM SELECTOR PLAN 3
--Total bilirubin 1.6.   --Medicine team recommending RUQ US, f/u indirect bilirubin      # Urinary retention  - Pt retaining urine s/p straight cath x 2. Continues to intermittently be retaining urine.   - Continue to monitor, will place soto if retains again today

## 2022-12-23 NOTE — PHYSICAL THERAPY INITIAL EVALUATION ADULT - PERTINENT HX OF CURRENT PROBLEM, REHAB EVAL
89-year-old female with a PMHx of HTN, HLD, hypothyroidism, TIA, history of AVR, Afib (s/p remote ablation, on Eliquis) and CHF who presented with acute of chronic decompensated heart failure and Afib with RVR.

## 2022-12-23 NOTE — PROGRESS NOTE ADULT - ASSESSMENT
89 yo F w/ PMHx of HTN, HLD, Hypothyroidism, TIA, s/p AVR, AFib (on Eliquis, s/p ablation > 10yrs ago), chronic dCHF (EF 55-60% by ECHO 12/21/22) who presented w/ volume overload w/ worsening LE edema over the past 2 months as well as AFib w/ RVR. EP consulted and will address further rhythm management once patient euvolemic. Pt being diuresed with Lasix 40mg IV TID and started on amio 200mg TID and increase Lopressor to 100mg BID

## 2022-12-23 NOTE — PROGRESS NOTE ADULT - PROBLEM SELECTOR PLAN 1
--PMHx of CHF takes Lasix 20mg PO BID at home; LE edema over 2mos; no respiratory symptoms.   --BNP 3126; 4+ LE pitting edema B/L to the thighs, bilateral rales on initial exam. Currently 2+ pitting edema  --TTE (12/21/22): LVEF 55-60%, mod-severe sLVH, ESTER, mild AR, mod MR, mod-severe TR, PASP 79mmHg, trivial pericardial effusion, L pleural effusion.   --CONT: Lasix 40mg IV TID.   --I/Os: Net neg 3.6 L   --PLAN: IV diuresis, continue w/ ACE wraps.

## 2022-12-23 NOTE — PROGRESS NOTE ADULT - PROBLEM SELECTOR PLAN 6
--SBP 110s-140's  --CONT: Lopressor 25mg PO q6hrs, Lasix 40mg IV TID.  --HOLD: Valsartan 40mg PO QD to make room for rate control and diuresis

## 2022-12-23 NOTE — PROGRESS NOTE ADULT - SUBJECTIVE AND OBJECTIVE BOX
HPI: 89F PMHx HTN, HLD, Hypothyroidism, TIA, s/p AVR, AFib (on Eliquis, s/p ablation > 10yrs ago), CHF (per outpatient bedside echo EF appears reduced) who presented to [facility] ED on 22 endorsing worsening LE edema over the past 2 months. Pt's outpatient cardiologist, Dr. David, sent her to [facility], at which time a bedside limited echo was done showing a possibly reduced LVEF. Pt found to have volume overload peripherally and in AFib w/ RVR (HR 100s). Pt denies CP, SOB, palpitations, dizziness, orthopnea, N/V, fever/chills, syncope, PND.     VITALS: HR 100s, -120/66-90, SpO2 98% on RA, RR 18, T 97.5F.   LABS: WBC 6.95, Hgb/Hct 11.5/36.3, Plt Cnt 196, Na 132, K 4.5, BUN/Cr 13/0.61, Trop T 0.01, CK 91, CKMB 39, BNP 3126.     TREATMENT IN ED: Lasix 40mg IV x1.     Patient admitted to cardiology for heart failure exacerbation and atrial fibrillation.  (20 Dec 2022 18:00)    PAST MEDICAL & SURGICAL HISTORY:  HTN (hypertension)  HLD (hyperlipidemia)  Congestive heart failure (CHF)  TIA (transient ischemic attack)  Hypothyroidism  Atrial fibrillation    PREVIOUS DIAGNOSTIC TESTING:    [ ] Echocardiogram:  [ ] Stress Test:  [ ] Catheterization: 	    FAMILY HISTORY:    Social History:    ALLERGIES/INTOLERANCES:  No Known Allergies    HOME MEDICATIONS:    INPATIENT MEDICATIONS:  metoprolol tartrate 25 milliGRAM(s) Oral every 6 hours  valsartan 40 milliGRAM(s) Oral daily  apixaban 5 milliGRAM(s) Oral every 12 hours  atorvastatin 20 milliGRAM(s) Oral at bedtime  influenza  Vaccine (HIGH DOSE) 0.7 milliLiter(s) IntraMuscular once  levothyroxine 100 MICROGram(s) Oral daily    REVIEW OF SYSTEMS: See HPI     PHYSICAL EXAM:  Constitutional: resting comfortably in bed; NAD  Head: NC/AT  Eyes: PERRL, EOMI, clear conjunctiva  ENT: no nasal discharge; uvula midline, no oropharyngeal erythema or exudates; MMM  Neck: supple; unable to assess JVD d/t neck habitus. No thyromegaly  Respiratory: bibasilar crackles, no retractions  Cardiac: +S1/S2; irregular; no M/R/G;  Gastrointestinal: soft, NT/ND; no rebound or guarding; +BSx4  Extremities: WWP, no clubbing or cyanosis; significant bilateral pitting edema, wrapped in ACE wraps  Musculoskeletal: NROM x4; no joint swelling, tenderness or erythema  Vascular: 2+ radial, femoral, DP/PT pulses B/L  Dermatologic: skin warm, dry and intact; no rashes, wounds, or scars  Neurologic: AAOx3; CNII-XII grossly intact; no focal deficits  Psychiatric: affect and characteristics of appearance, verbalizations, behaviors are appropriate  T(C): 36.1 (22 @ 08:37), Max: 36.6 (22 @ 22:07)  HR: 126 (22 @ 08:39) (85 - 134)  BP: 118/72 (22 @ 08:39) (107/66 - 136/75)  RR: 18 (22 @ 08:39) (15 - 20)  SpO2: 98% (22 @ 08:39) (93% - 98%)  Wt(kg): --    I&O's Summary  20 Dec 2022 07:  -  21 Dec 2022 07:00  --------------------------------------------------------  IN: 0 mL / OUT: 1950 mL / NET: -1950 mL    21 Dec 2022 07:  -  21 Dec 2022 12:10  --------------------------------------------------------  IN: 180 mL / OUT: 0 mL / NET: 180 mL    TELEMETRY: 	      ECG:    	  LABS:                         11.4   8.70  )-----------( 196      ( 23 Dec 2022 05:30 )             36.9         133<L>  |  92<L>  |  14  ----------------------------<  121<H>  3.6   |  31  |  0.50    Ca    8.6      23 Dec 2022 05:30  Mg     1.8     12-    TPro  6.9  /  Alb  3.7  /  TBili  1.8<H>  /  DBili  0.4<H>  /  AST  35  /  ALT  32  /  AlkPhos  76  12-    Urinalysis Basic - ( 21 Dec 2022 15:53 )    Color: Yellow / Appearance: Clear / S.010 / pH: x  Gluc: x / Ketone: NEGATIVE  / Bili: Negative / Urobili: 0.2 E.U./dL   Blood: x / Protein: NEGATIVE mg/dL / Nitrite: NEGATIVE   Leuk Esterase: Small / RBC: < 5 /HPF / WBC 5-10 /HPF   Sq Epi: x / Non Sq Epi: 0-5 /HPF / Bacteria: None /HPF    RADIOLOGY, EKG & ADDITIONAL TESTS:

## 2022-12-23 NOTE — CONSULT NOTE ADULT - SUBJECTIVE AND OBJECTIVE BOX
Patient is a 89y old  Female who presents with a chief complaint of HF exacerbation (22 Dec 2022 10:17)    INTERVAL EVENTS:    SUBJECTIVE:  Patient was seen and examined at bedside. Reports feeling at baseline, denies SOB, no chest pain, no dizziness. Denies abdominal pain,  no N/V.     Review of systems: No fever, chills, dizziness, HA, Changes in vision, CP, dyspnea, nausea or vomiting, dysuria, changes in bowel movements, LE edema. Rest of 12 point Review of systems negative unless otherwise documented elsewhere in note.     Diet, DASH/TLC:   Sodium & Cholesterol Restricted  1000mL Fluid Restriction ([id number]) (22 @ 19:02) [Active]      MEDICATIONS:  MEDICATIONS  (STANDING):  apixaban 5 milliGRAM(s) Oral every 12 hours  atorvastatin 20 milliGRAM(s) Oral at bedtime  furosemide   Injectable 40 milliGRAM(s) IV Push every 8 hours  influenza  Vaccine (HIGH DOSE) 0.7 milliLiter(s) IntraMuscular once  iron sucrose IVPB 300 milliGRAM(s) IV Intermittent <User Schedule>  levothyroxine 100 MICROGram(s) Oral daily  metoprolol tartrate 50 milliGRAM(s) Oral every 6 hours  senna 2 Tablet(s) Oral at bedtime    MEDICATIONS  (PRN):  ALPRAZolam 0.25 milliGRAM(s) Oral daily PRN Anxiety  sodium chloride 0.65% Nasal 1 Spray(s) Both Nostrils daily PRN Nasal Congestion      Allergies    No Known Allergies    Intolerances        OBJECTIVE:  Vital Signs Last 24 Hrs  T(C): 36.8 (23 Dec 2022 05:49), Max: 36.8 (22 Dec 2022 18:09)  T(F): 98.3 (23 Dec 2022 05:49), Max: 98.3 (22 Dec 2022 18:09)  HR: 126 (23 Dec 2022 08:58) (120 - 137)  BP: 146/61 (23 Dec 2022 08:58) (98/60 - 146/61)  BP(mean): --  RR: 18 (23 Dec 2022 08:58) (16 - 20)  SpO2: 94% (23 Dec 2022 08:58) (88% - 96%)    Parameters below as of 23 Dec 2022 08:58  Patient On (Oxygen Delivery Method): nasal cannula  O2 Flow (L/min): 2    I&O's Summary    22 Dec 2022 07:01  -  23 Dec 2022 07:00  --------------------------------------------------------  IN: 870 mL / OUT: 1560 mL / NET: -690 mL        PHYSICAL EXAM:  Gen: Reclining in bed at time of exam, appears stated age  HEENT: AT/NC, no facial asymmetry   CV: Irregular   Pulm: adequate respiratory effort, no increase in work of breathing, bibasilar crackles  Abd: soft, NTND  Skin: warm and dry,   Ext: WWP, no+ edema, leg in ACE wraps, no focal deficit   Neuro: AOx3, speaking in full sentences      LABS:                        11.4   8.70  )-----------( 196      ( 23 Dec 2022 05:30 )             36.9     12-    133<L>  |  92<L>  |  14  ----------------------------<  121<H>  3.6   |  31  |  0.50    Ca    8.6      23 Dec 2022 05:30  Mg     1.8     12-    TPro  6.9  /  Alb  3.7  /  TBili  1.8<H>  /  DBili  0.4<H>  /  AST  35  /  ALT  32  /  AlkPhos  76  12-22    LIVER FUNCTIONS - ( 22 Dec 2022 05:30 )  Alb: 3.7 g/dL / Pro: 6.9 g/dL / ALK PHOS: 76 U/L / ALT: 32 U/L / AST: 35 U/L / GGT: x             CAPILLARY BLOOD GLUCOSE        Urinalysis Basic - ( 21 Dec 2022 15:53 )    Color: Yellow / Appearance: Clear / S.010 / pH: x  Gluc: x / Ketone: NEGATIVE  / Bili: Negative / Urobili: 0.2 E.U./dL   Blood: x / Protein: NEGATIVE mg/dL / Nitrite: NEGATIVE   Leuk Esterase: Small / RBC: < 5 /HPF / WBC 5-10 /HPF   Sq Epi: x / Non Sq Epi: 0-5 /HPF / Bacteria: None /HPF        MICRODATA:      RADIOLOGY/OTHER STUDIES:

## 2022-12-23 NOTE — PHYSICAL THERAPY INITIAL EVALUATION ADULT - ADDITIONAL COMMENTS
Pt states she lives with adult great grandson inelevator building. Pt has HHA 24 hrs and then extra private HHA for 9.5 hours. Pt has rollator she uses at home to amb and was mostly independent with ADLs PTA.

## 2022-12-23 NOTE — PROVIDER CONTACT NOTE (MEDICATION) - ACTION/TREATMENT ORDERED:
reschedule metoprolol another hour later pending BP reassessment, readjust medication based on q6h scheduling

## 2022-12-23 NOTE — PROGRESS NOTE ADULT - ASSESSMENT
89F PMHx HTN, HLD, Hypothyroidism, TIA, s/p AVR, AFib (on Eliquis, s/p ablation > 10yrs ago), CHF (per outpatient bedside echo EF appears reduced) who presented to [facility] ED on 12/20/22 endorsing worsening LE edema over the past 2 months. Clinically volume overload and in AFib RVR.    #AFib RVR (-120s)  Per patient, Hx ablation >10yrs ago. C/o x2mths worsening LE edema iso CHF, volume overloaded on exam. Presented in AFib RVR, chronicity unclear. Holding home Atenolol 25mg Qd, started on Lopressor 25mg q6h w/o conversion to NSR so far. [id number] 8pts, resumed home Eliquis 5mg BID. TTE: LVEF 55-60% iso AFib, Moderate-severe symmetric LVH. Biatrial enlargement.  - Recommend to c/w diuresis iso volume overload affecting LA dilation precipitating AFib  - Increase Lopressor to 100mg BID & Start PO Amiodarone 200mg TID  - Possible DCCV if still in AFib once euvolemic 89F PMHx HTN, HLD, Hypothyroidism, TIA, s/p AVR, AFib (on Eliquis, s/p ablation > 10yrs ago), CHF (per outpatient bedside echo EF appears reduced) who presented to [facility] ED on 12/20/22 endorsing worsening LE edema over the past 2 months. Clinically volume overload and in AFib RVR.    #Persistent AFib RVR  Per patient, Hx ablation >10yrs ago. C/o x2mths worsening LE edema iso CHF, volume overloaded on exam. Max HR for age ~125bpm, presented in persistent AFib -120bpm, while inpatient occasionally spiking to 150bpm. Takes Atenolol 25mg Qd at home so far. [id number] 8pts, resumed home Eliquis 5mg BID. TTE: LVEF 55-60% iso AFib, Moderate-severe symmetric LVH. Biatrial enlargement.  - Recommend to c/w diuresis iso volume overload affecting LA dilation precipitating AFib  - Iso poor rate control on current regimen, increase Lopressor to 100mg BID & start PO Amiodarone 200mg TID  - Possible DCCV if still in AFib once euvolemic

## 2022-12-23 NOTE — PROGRESS NOTE ADULT - SUBJECTIVE AND OBJECTIVE BOX
INCOMPLETE  OPEN NOTE for attending   OVERNIGHT EVENTS:  No acute events overnight.    SUBJECTIVE / INTERVAL HPI: Patient seen and examined at bedside.    Denies CP, SOB, dizziness/lightheadedness, palpitations    12 point ROS otherwise negative    VITAL SIGNS:  Vital Signs Last 24 Hrs  T(C): 36.2 (23 Dec 2022 09:56), Max: 36.8 (22 Dec 2022 18:09)  T(F): 97.1 (23 Dec 2022 09:56), Max: 98.3 (22 Dec 2022 18:09)  HR: 147 (23 Dec 2022 13:00) (120 - 147)  BP: 144/74 (23 Dec 2022 13:00) (98/60 - 146/61)  BP(mean): --  RR: 18 (23 Dec 2022 13:00) (16 - 20)  SpO2: 94% (23 Dec 2022 13:00) (88% - 95%)    Parameters below as of 23 Dec 2022 13:00  Patient On (Oxygen Delivery Method): nasal cannula  O2 Flow (L/min): 2        I&O's Summary    22 Dec 2022 07:01  -  23 Dec 2022 07:00  --------------------------------------------------------  IN: 870 mL / OUT: 1560 mL / NET: -690 mL    23 Dec 2022 07:01  -  23 Dec 2022 16:38  --------------------------------------------------------  IN: 400 mL / OUT: 350 mL / NET: 50 mL          PHYSICAL EXAM:    General: sitting up in bed, NAD  HEENT: conjunctiva clear; MMM  Neck: supple, no JVD  Cardiovascular: RRR, no murmurs  Respiratory: CTA B/L  Gastrointestinal: soft, NT/ND, +BS  Extremities: WWP, no edema or cyanosis  Vascular: Peripheral pulses palpable 2+ bilaterally/ carotid 2+ b/l, no bruits; radial 2+ b/l; femoral 2+ b/l no bruits; DP/PT 2+ b/l  Neurological: AAOx3, no focal deficits    MEDICATIONS:  MEDICATIONS  (STANDING):  aMIOdarone    Tablet 200 milliGRAM(s) Oral three times a day  apixaban 5 milliGRAM(s) Oral every 12 hours  atorvastatin 20 milliGRAM(s) Oral at bedtime  furosemide   Injectable 40 milliGRAM(s) IV Push every 8 hours  influenza  Vaccine (HIGH DOSE) 0.7 milliLiter(s) IntraMuscular once  iron sucrose IVPB 300 milliGRAM(s) IV Intermittent <User Schedule>  levothyroxine 100 MICROGram(s) Oral daily  metoprolol tartrate 100 milliGRAM(s) Oral two times a day  senna 2 Tablet(s) Oral at bedtime    MEDICATIONS  (PRN):  ALPRAZolam 0.25 milliGRAM(s) Oral daily PRN Anxiety  sodium chloride 0.65% Nasal 1 Spray(s) Both Nostrils daily PRN Nasal Congestion      LABS:                        11.4   8.70  )-----------( 196      ( 23 Dec 2022 05:30 )             36.9       12-23    133<L>  |  92<L>  |  14  ----------------------------<  121<H>  3.6   |  31  |  0.50    Ca    8.6      23 Dec 2022 05:30  Mg     1.8     12-23    TPro  6.9  /  Alb  3.7  /  TBili  1.8<H>  /  DBili  0.4<H>  /  AST  35  /  ALT  32  /  AlkPhos  76  12-22                TELEMETRY:    RADIOLOGY & ADDITIONAL TESTS: Reviewed. OVERNIGHT EVENTS:  No acute events overnight.    SUBJECTIVE / INTERVAL HPI: Patient seen and examined at bedside.    Denies CP, SOB, dizziness/lightheadedness, palpitations    12 point ROS otherwise negative    VITAL SIGNS:  Vital Signs Last 24 Hrs  T(C): 36.2 (23 Dec 2022 09:56), Max: 36.8 (22 Dec 2022 18:09)  T(F): 97.1 (23 Dec 2022 09:56), Max: 98.3 (22 Dec 2022 18:09)  HR: 147 (23 Dec 2022 13:00) (120 - 147)  BP: 144/74 (23 Dec 2022 13:00) (98/60 - 146/61)  BP(mean): --  RR: 18 (23 Dec 2022 13:00) (16 - 20)  SpO2: 94% (23 Dec 2022 13:00) (88% - 95%)    Parameters below as of 23 Dec 2022 13:00  Patient On (Oxygen Delivery Method): nasal cannula  O2 Flow (L/min): 2        I&O's Summary    22 Dec 2022 07:01  -  23 Dec 2022 07:00  --------------------------------------------------------  IN: 870 mL / OUT: 1560 mL / NET: -690 mL    23 Dec 2022 07:01  -  23 Dec 2022 16:38  --------------------------------------------------------  IN: 400 mL / OUT: 350 mL / NET: 50 mL          PHYSICAL EXAM:    General: sitting up in bed, NAD  HEENT: conjunctiva clear; MMM  Neck: supple, no JVD  Cardiovascular: IRR, no murmurs  Respiratory: CTA B/L  Gastrointestinal: soft, NT/ND, +BS  Extremities: WWP, no edema or cyanosis  Vascular: Peripheral pulses palpable 2+ bilaterally/ carotid 2+ b/l, no bruits; radial 2+ b/l; femoral 2+ b/l no bruits; DP/PT 2+ b/l  Neurological: AAOx3, no focal deficits    MEDICATIONS:  MEDICATIONS  (STANDING):  aMIOdarone    Tablet 200 milliGRAM(s) Oral three times a day  apixaban 5 milliGRAM(s) Oral every 12 hours  atorvastatin 20 milliGRAM(s) Oral at bedtime  furosemide   Injectable 40 milliGRAM(s) IV Push every 8 hours  influenza  Vaccine (HIGH DOSE) 0.7 milliLiter(s) IntraMuscular once  iron sucrose IVPB 300 milliGRAM(s) IV Intermittent <User Schedule>  levothyroxine 100 MICROGram(s) Oral daily  metoprolol tartrate 100 milliGRAM(s) Oral two times a day  senna 2 Tablet(s) Oral at bedtime    MEDICATIONS  (PRN):  ALPRAZolam 0.25 milliGRAM(s) Oral daily PRN Anxiety  sodium chloride 0.65% Nasal 1 Spray(s) Both Nostrils daily PRN Nasal Congestion      LABS:                        11.4   8.70  )-----------( 196      ( 23 Dec 2022 05:30 )             36.9       12-23    133<L>  |  92<L>  |  14  ----------------------------<  121<H>  3.6   |  31  |  0.50    Ca    8.6      23 Dec 2022 05:30  Mg     1.8     12-23    TPro  6.9  /  Alb  3.7  /  TBili  1.8<H>  /  DBili  0.4<H>  /  AST  35  /  ALT  32  /  AlkPhos  76  12-22                TELEMETRY:    RADIOLOGY & ADDITIONAL TESTS: Reviewed.

## 2022-12-23 NOTE — PROVIDER CONTACT NOTE (MEDICATION) - BACKGROUND
Pt admitted for CHF exacerbation with Afib w/ RVR, baseline SBP in the 110-130s, lasix and metoprolol scheduled this AM, lasix administered per BARBARA Enriquez, metoprolol rescheduled per parameter

## 2022-12-23 NOTE — PROGRESS NOTE ADULT - NS ATTEND AMEND GEN_ALL_CORE FT
89F w/ Essential  HTN, HLD, Hypothyroidism, hx TIA, hx AVR, AFib (on Eliquis, s/p ablation > 10yrs ago), Chronic CHFpEF who p/w acute on chronic decompensated diastolic CHF and AFib w/ RVR. Volume status currently improving on TID IV diuresis. I/O tracking is inaccurate due to UOP being lost in bed sheets (leaking of primafit). Afib RVR still remains recalcitrant to beta blocker therapy.  Physical Exam notable for: elderly patient laying in bed in NAD, elevated neck veins, irreg irreg rhythm, tachycardic rate, diminished breath sounds in bases, edematous from toes to hips but improving daily, skin WWP, A&Ox3  TTE 12.21.22: Afib RVR during study, LVEF 55-60%. Mod-severe LVH, normal RV size and function, biatrial enlargement, bioAVR  The leaflets appear thickened. The peak transvalvular velocity is 3.08 m/s, the mean transvalvular gradient is 22.00 mmHg, and the LVOT/AV velocity ratio is 0.33. The values are borderline elevated for aortic bioprosthesis. Mild AI, severe MAC MG 5mmHg, moderate MR, 3+TR, PASP 79, trivial pericardial effusion, L pleural effusion  Plan for:  Diurese with IV Lasix 40 TID, goal net neg 2-3L/day  Initiate Amiodarone 200mg po TID per EP recs  Lopressor changed to 100mg po BID per EP recs  Eliquis 5 BID for afib cva risk reduction  Home Valsartan held to allow for up titration of Lopressor and aggressive IV diuresis  IV iron for CARRILLO in CHF per protocol  EP following for rate/rhythm control, DCCV when euvolemic  Repeat TTE when diuresed to dry state AND in NSR, wait until after DCCV to repeat echo.  If bioAVR gradients remain high, consult CTSx Structural  ACE wrap lower extremities to the thighs bilaterally, ON during day OFF at night  PT rec home with HPT and continued 24 hr HHA (pt has 2 person assist available at home)  Future planning: patient to have 1wk f/u appt made with Cardiologist for quality improvement CHF readmission risk reduction  Kit Arciniega M.D.  Cardiology Attending

## 2022-12-23 NOTE — CONSULT NOTE ADULT - ASSESSMENT
89-year-old female with a PMHx of HTN, HLD, hypothyroidism, TIA, history of AVR, Afib (s/p remote ablation, on Eliquis) and CHF who presented with acute of chronic decompensated heart failure and Afib with RVR.        #Congestive Heart Failure    -further management as per primary team     -TTE (12/21): EF 55-60%, moderate to severe LVH, moderate to severe TR and pHTN   -further diuresis as per primary team, currently on IV lasix 40mg q8hrs   -strict I/Os and fluid restriction       #Atrial Fibrillation with RVR    -further management as per primary team and EP   -continue with Eliquis 5mg BID    -currently on metoprolol tartrate 50mg q6hrs (home atenolol 25mg daily held)     -EP consulted, will consider cardioversion when euvolemic      #Hypothyroidism     -continue with levothyroxine 100mcg daily    -TSH and T4 within normal limits       #Elevated Bilirubin     -unclear etiology of elevation, no signs or symptoms of biliary pathology     -obtain indirect bilirubin and RUQ U  -consider GI evaluation if further worsening     #Microcytic Anemia     -iron studies most consistent with CARRILLO   -can start IV iron while inpatient and consider starting ferrous sulfate 325mg daily upon discharge      #Hyponatremia     -mild and asymptomatic, very likely hypervolemic in setting of ADHF    -obtain urine osm and urine electrolytes (with urine urea given diuresis)       #Pre-Diabetes (A1c 6.4%)    -no history of DMII and is not on any medications    -outpatient PCP follow up      #HTN   -continue with valsartan 40mg daily       #HLD    -continue with atorvastatin 20mg daily       DVT PPx: Eliquis     Dispo: pending PT evaluation

## 2022-12-23 NOTE — PHYSICAL THERAPY INITIAL EVALUATION ADULT - GENERAL OBSERVATIONS, REHAB EVAL
Pt received semi supine in bed +hep lock +tele +2LNC. PT received consent to treat from BARBARA palma. Pt was left as received with call bell in reach, VSS, and in NAD.

## 2022-12-23 NOTE — PROGRESS NOTE ADULT - PROBLEM SELECTOR PLAN 4
- Iron studies most c/w CARRILLO   - Started IV iron per Medicine recs while inpatient, consider starting ferrous sulfate 325mg daily upon discharge

## 2022-12-23 NOTE — PROGRESS NOTE ADULT - PROBLEM SELECTOR PLAN 2
--AFib w/ RVR w/ HR in 110s-120s.   --Reports ablation w/ Dr. Moon > 10yrs ago.   --HOLD home Atenolol 25mg PO QD.   - Started amio 200mg PO TID, if not affecting heart rate plan for ablation or PPM next week per EP recs  - EP following appreciate recs   --CONT: Eliquis 5mg PO BID, Lopressor increased to 100mg BID,    --Received Lopressor 5mg IV x 1 12/22 overnight for -150's  --PLAN: EP consulted - will discuss further rhythm management once patient more euvolemic.

## 2022-12-24 LAB
ALBUMIN SERPL ELPH-MCNC: 3.1 G/DL — LOW (ref 3.3–5)
ALP SERPL-CCNC: 68 U/L — SIGNIFICANT CHANGE UP (ref 40–120)
ALT FLD-CCNC: 22 U/L — SIGNIFICANT CHANGE UP (ref 10–45)
ANION GAP SERPL CALC-SCNC: 8 MMOL/L — SIGNIFICANT CHANGE UP (ref 5–17)
ANION GAP SERPL CALC-SCNC: 8 MMOL/L — SIGNIFICANT CHANGE UP (ref 5–17)
AST SERPL-CCNC: 26 U/L — SIGNIFICANT CHANGE UP (ref 10–40)
BASOPHILS # BLD AUTO: 0.02 K/UL — SIGNIFICANT CHANGE UP (ref 0–0.2)
BASOPHILS NFR BLD AUTO: 0.1 % — SIGNIFICANT CHANGE UP (ref 0–2)
BILIRUB SERPL-MCNC: 1.7 MG/DL — HIGH (ref 0.2–1.2)
BUN SERPL-MCNC: 18 MG/DL — SIGNIFICANT CHANGE UP (ref 7–23)
BUN SERPL-MCNC: 18 MG/DL — SIGNIFICANT CHANGE UP (ref 7–23)
BUN SERPL-MCNC: 19 MG/DL — SIGNIFICANT CHANGE UP (ref 7–23)
CALCIUM SERPL-MCNC: 8.8 MG/DL — SIGNIFICANT CHANGE UP (ref 8.4–10.5)
CALCIUM SERPL-MCNC: 8.8 MG/DL — SIGNIFICANT CHANGE UP (ref 8.4–10.5)
CALCIUM SERPL-MCNC: 9 MG/DL — SIGNIFICANT CHANGE UP (ref 8.4–10.5)
CHLORIDE SERPL-SCNC: 92 MMOL/L — LOW (ref 96–108)
CHLORIDE SERPL-SCNC: 93 MMOL/L — LOW (ref 96–108)
CO2 SERPL-SCNC: 30 MMOL/L — SIGNIFICANT CHANGE UP (ref 22–31)
CO2 SERPL-SCNC: 31 MMOL/L — SIGNIFICANT CHANGE UP (ref 22–31)
CO2 SERPL-SCNC: 32 MMOL/L — HIGH (ref 22–31)
CREAT SERPL-MCNC: 0.55 MG/DL — SIGNIFICANT CHANGE UP (ref 0.5–1.3)
CREAT SERPL-MCNC: 0.62 MG/DL — SIGNIFICANT CHANGE UP (ref 0.5–1.3)
CREAT SERPL-MCNC: 0.65 MG/DL — SIGNIFICANT CHANGE UP (ref 0.5–1.3)
D DIMER BLD IA.RAPID-MCNC: 632 NG/ML DDU — HIGH
EGFR: 81 ML/MIN/1.73M2 — SIGNIFICANT CHANGE UP
EGFR: 82 ML/MIN/1.73M2 — SIGNIFICANT CHANGE UP
EGFR: 84 ML/MIN/1.73M2 — SIGNIFICANT CHANGE UP
EOSINOPHIL # BLD AUTO: 0.06 K/UL — SIGNIFICANT CHANGE UP (ref 0–0.5)
EOSINOPHIL NFR BLD AUTO: 0.4 % — SIGNIFICANT CHANGE UP (ref 0–6)
GLUCOSE SERPL-MCNC: 132 MG/DL — HIGH (ref 70–99)
GLUCOSE SERPL-MCNC: 148 MG/DL — HIGH (ref 70–99)
GLUCOSE SERPL-MCNC: 173 MG/DL — HIGH (ref 70–99)
HCT VFR BLD CALC: 35.3 % — SIGNIFICANT CHANGE UP (ref 34.5–45)
HCT VFR BLD CALC: 36.2 % — SIGNIFICANT CHANGE UP (ref 34.5–45)
HGB BLD-MCNC: 11.3 G/DL — LOW (ref 11.5–15.5)
HGB BLD-MCNC: 11.5 G/DL — SIGNIFICANT CHANGE UP (ref 11.5–15.5)
IMM GRANULOCYTES NFR BLD AUTO: 0.6 % — SIGNIFICANT CHANGE UP (ref 0–0.9)
LACTATE SERPL-SCNC: 1.2 MMOL/L — SIGNIFICANT CHANGE UP (ref 0.5–2)
LACTATE SERPL-SCNC: 1.2 MMOL/L — SIGNIFICANT CHANGE UP (ref 0.5–2)
LYMPHOCYTES # BLD AUTO: 0.71 K/UL — LOW (ref 1–3.3)
LYMPHOCYTES # BLD AUTO: 4.6 % — LOW (ref 13–44)
MAGNESIUM SERPL-MCNC: 1.6 MG/DL — SIGNIFICANT CHANGE UP (ref 1.6–2.6)
MAGNESIUM SERPL-MCNC: 1.6 MG/DL — SIGNIFICANT CHANGE UP (ref 1.6–2.6)
MAGNESIUM SERPL-MCNC: 1.8 MG/DL — SIGNIFICANT CHANGE UP (ref 1.6–2.6)
MCHC RBC-ENTMCNC: 24.7 PG — LOW (ref 27–34)
MCHC RBC-ENTMCNC: 24.7 PG — LOW (ref 27–34)
MCHC RBC-ENTMCNC: 31.8 GM/DL — LOW (ref 32–36)
MCHC RBC-ENTMCNC: 32 GM/DL — SIGNIFICANT CHANGE UP (ref 32–36)
MCV RBC AUTO: 77.1 FL — LOW (ref 80–100)
MCV RBC AUTO: 77.7 FL — LOW (ref 80–100)
MONOCYTES # BLD AUTO: 1.29 K/UL — HIGH (ref 0–0.9)
MONOCYTES NFR BLD AUTO: 8.4 % — SIGNIFICANT CHANGE UP (ref 2–14)
NEUTROPHILS # BLD AUTO: 13.25 K/UL — HIGH (ref 1.8–7.4)
NEUTROPHILS NFR BLD AUTO: 85.9 % — HIGH (ref 43–77)
NRBC # BLD: 0 /100 WBCS — SIGNIFICANT CHANGE UP (ref 0–0)
NRBC # BLD: 0 /100 WBCS — SIGNIFICANT CHANGE UP (ref 0–0)
PLATELET # BLD AUTO: 172 K/UL — SIGNIFICANT CHANGE UP (ref 150–400)
PLATELET # BLD AUTO: 197 K/UL — SIGNIFICANT CHANGE UP (ref 150–400)
POTASSIUM SERPL-MCNC: 3.9 MMOL/L — SIGNIFICANT CHANGE UP (ref 3.5–5.3)
POTASSIUM SERPL-MCNC: 4.2 MMOL/L — SIGNIFICANT CHANGE UP (ref 3.5–5.3)
POTASSIUM SERPL-SCNC: 3.9 MMOL/L — SIGNIFICANT CHANGE UP (ref 3.5–5.3)
POTASSIUM SERPL-SCNC: 4.2 MMOL/L — SIGNIFICANT CHANGE UP (ref 3.5–5.3)
PROCALCITONIN SERPL-MCNC: 0.13 NG/ML — HIGH (ref 0.02–0.1)
PROT SERPL-MCNC: 6.3 G/DL — SIGNIFICANT CHANGE UP (ref 6–8.3)
RBC # BLD: 4.58 M/UL — SIGNIFICANT CHANGE UP (ref 3.8–5.2)
RBC # BLD: 4.66 M/UL — SIGNIFICANT CHANGE UP (ref 3.8–5.2)
RBC # FLD: 16.4 % — HIGH (ref 10.3–14.5)
RBC # FLD: 16.7 % — HIGH (ref 10.3–14.5)
SODIUM SERPL-SCNC: 131 MMOL/L — LOW (ref 135–145)
SODIUM SERPL-SCNC: 132 MMOL/L — LOW (ref 135–145)
WBC # BLD: 11.11 K/UL — HIGH (ref 3.8–10.5)
WBC # BLD: 15.43 K/UL — HIGH (ref 3.8–10.5)
WBC # FLD AUTO: 11.11 K/UL — HIGH (ref 3.8–10.5)
WBC # FLD AUTO: 15.43 K/UL — HIGH (ref 3.8–10.5)

## 2022-12-24 PROCEDURE — 99233 SBSQ HOSP IP/OBS HIGH 50: CPT

## 2022-12-24 PROCEDURE — 76705 ECHO EXAM OF ABDOMEN: CPT | Mod: 26

## 2022-12-24 PROCEDURE — 99232 SBSQ HOSP IP/OBS MODERATE 35: CPT

## 2022-12-24 PROCEDURE — 71045 X-RAY EXAM CHEST 1 VIEW: CPT | Mod: 26

## 2022-12-24 PROCEDURE — 74018 RADEX ABDOMEN 1 VIEW: CPT | Mod: 26

## 2022-12-24 RX ORDER — ACETAMINOPHEN 500 MG
650 TABLET ORAL ONCE
Refills: 0 | Status: COMPLETED | OUTPATIENT
Start: 2022-12-24 | End: 2022-12-24

## 2022-12-24 RX ORDER — FUROSEMIDE 40 MG
10 TABLET ORAL
Qty: 500 | Refills: 0 | Status: DISCONTINUED | OUTPATIENT
Start: 2022-12-24 | End: 2022-12-25

## 2022-12-24 RX ORDER — SIMETHICONE 80 MG/1
80 TABLET, CHEWABLE ORAL ONCE
Refills: 0 | Status: COMPLETED | OUTPATIENT
Start: 2022-12-24 | End: 2022-12-24

## 2022-12-24 RX ORDER — PIPERACILLIN AND TAZOBACTAM 4; .5 G/20ML; G/20ML
3.38 INJECTION, POWDER, LYOPHILIZED, FOR SOLUTION INTRAVENOUS ONCE
Refills: 0 | Status: COMPLETED | OUTPATIENT
Start: 2022-12-24 | End: 2022-12-24

## 2022-12-24 RX ORDER — METOPROLOL TARTRATE 50 MG
5 TABLET ORAL ONCE
Refills: 0 | Status: COMPLETED | OUTPATIENT
Start: 2022-12-24 | End: 2022-12-24

## 2022-12-24 RX ORDER — VANCOMYCIN HCL 1 G
1000 VIAL (EA) INTRAVENOUS ONCE
Refills: 0 | Status: COMPLETED | OUTPATIENT
Start: 2022-12-24 | End: 2022-12-25

## 2022-12-24 RX ADMIN — Medication 100 MILLIGRAM(S): at 06:59

## 2022-12-24 RX ADMIN — Medication 650 MILLIGRAM(S): at 20:35

## 2022-12-24 RX ADMIN — SIMETHICONE 80 MILLIGRAM(S): 80 TABLET, CHEWABLE ORAL at 12:41

## 2022-12-24 RX ADMIN — AMIODARONE HYDROCHLORIDE 200 MILLIGRAM(S): 400 TABLET ORAL at 14:26

## 2022-12-24 RX ADMIN — IRON SUCROSE 132.5 MILLIGRAM(S): 20 INJECTION, SOLUTION INTRAVENOUS at 14:26

## 2022-12-24 RX ADMIN — Medication 5 MILLIGRAM(S): at 03:45

## 2022-12-24 RX ADMIN — AMIODARONE HYDROCHLORIDE 200 MILLIGRAM(S): 400 TABLET ORAL at 21:05

## 2022-12-24 RX ADMIN — APIXABAN 5 MILLIGRAM(S): 2.5 TABLET, FILM COATED ORAL at 12:44

## 2022-12-24 RX ADMIN — Medication 40 MILLIGRAM(S): at 10:11

## 2022-12-24 RX ADMIN — Medication 40 MILLIGRAM(S): at 22:49

## 2022-12-24 RX ADMIN — Medication 650 MILLIGRAM(S): at 21:40

## 2022-12-24 RX ADMIN — Medication 100 MICROGRAM(S): at 07:00

## 2022-12-24 RX ADMIN — Medication 0.25 MILLIGRAM(S): at 00:06

## 2022-12-24 RX ADMIN — ATORVASTATIN CALCIUM 20 MILLIGRAM(S): 80 TABLET, FILM COATED ORAL at 21:05

## 2022-12-24 RX ADMIN — Medication 40 MILLIGRAM(S): at 16:47

## 2022-12-24 RX ADMIN — AMIODARONE HYDROCHLORIDE 200 MILLIGRAM(S): 400 TABLET ORAL at 03:52

## 2022-12-24 NOTE — PROGRESS NOTE ADULT - ASSESSMENT
89-year-old female with a PMHx of HTN, HLD, hypothyroidism, TIA, history of AVR, Afib (s/p remote ablation, on Eliquis) and CHF who presented with acute of chronic decompensated heart failure and Afib with RVR.        #Congestive Heart Failure    -further management as per primary team     -TTE (12/21): EF 55-60%, moderate to severe LVH, moderate to severe TR and pHTN   -further diuresis as per primary team, currently on IV lasix 40mg q8hrs   -strict I/Os and fluid restriction     -consider palliative care consult pending clinical course       #Atrial Fibrillation with RVR    -further management as per primary team and EP   -continue with Eliquis 5mg BID    -currently on metoprolol tartrate 100mg BID  and started on amiodarone  -EP consulted, will consider cardioversion when euvolemic and few more days of amiodarone    #Hypothyroidism     -continue with levothyroxine 100mcg daily    -TSH and T4 within normal limits       #Elevated Bilirubin     -unclear etiology of minor elevation, no signs or symptoms of biliary pathology     -obtain indirect bilirubin and RUQ US       #Microcytic Anemia     -iron studies most consistent with CARRILLO   -IV iron x 3 days, then ferrous sulfate 325mg po daily    #Hyponatremia     -mild and asymptomatic, very likely hypervolemic in setting of ADHF      #Pre-Diabetes (A1c 6.4%)    -no history of DMII and is not on any medications    -outpatient PCP follow up      #HTN   -continue with valsartan 40mg daily       #HLD    -continue with atorvastatin 20mg daily       DVT PPx: Eliquis     Dispo: pending PT evaluation

## 2022-12-24 NOTE — PROGRESS NOTE ADULT - PROBLEM SELECTOR PLAN 1
--PMHx of CHF takes Lasix 20mg PO BID at home; LE edema over 2mos; no respiratory symptoms.   --BNP 3126; 4+ LE pitting edema B/L to the thighs, bilateral rales on initial exam. Currently 2+ pitting edema  --TTE (12/21/22): LVEF 55-60%, mod-severe sLVH, ESTER, mild AR, mod MR, mod-severe TR, PASP 79mmHg, trivial pericardial effusion, L pleural effusion.   --CONT: Lasix 40mg IV TID.   --I/Os: Net neg 4. L   --PLAN: IV diuresis, continue w/ ACE wraps.

## 2022-12-24 NOTE — PROGRESS NOTE ADULT - ASSESSMENT
89 yo F w/ PMHx of HTN, HLD, Hypothyroidism, TIA, s/p AVR, AFib (on Eliquis, s/p ablation > 10yrs ago), chronic dCHF (EF 55-60% by ECHO 12/21/22) who presented w/ volume overload w/ worsening LE edema over the past 2 months as well as AFib w/ RVR. EP consulted and will address further rhythm management once patient euvolemic. Pt being diuresed with Lasix 40mg IV TID and started on amio 200mg TID and increase Lopressor to 100mg BID. Plan for continued diuresis and possible DCCV vs Ablation with micra PPM early next week keep NPO after midnight for Monday 12.26 per EP.       89 yo F w/ PMHx of HTN, HLD, Hypothyroidism, TIA, s/p AVR, AFib (on Eliquis, s/p ablation > 10yrs ago), chronic dCHF (EF 55-60% by ECHO 12/21/22) who presented w/ volume overload w/ worsening LE edema over the past 2 months as well as AFib w/ RVR. EP consulted and will address further rhythm management once patient euvolemic. Pt being diuresed with Lasix 40mg IV TID and started on amio 200mg TID and increase Lopressor to 100mg BID. Plan for continued diuresis and possible DCCV vs Ablation with micra PPM early next week keep NPO after midnight for Monday 12/26 per EP.

## 2022-12-24 NOTE — PROGRESS NOTE ADULT - PROBLEM SELECTOR PLAN 3
--Total bilirubin 1.6. Direct Billirubin .4   --Medicine team recommending RUQ US performed ,       # Urinary retention  - Pt retaining urine s/p straight cath x 2. Continues to intermittently be retaining urine.   - Continue to monitor, will place soto if retains again today  - Currently no issues with retaining 12-23 or 12-24, urine dark on 12/24 with slight increase WBC 11, afebrile pt denying any symptoms appearing slightly lethargic. Repeat UA and flex sent, low threshold for infectious workup. Plan for blood cultures if patient spike temp. LFTS added on. Start on empiric coverage if culture comes back positive. --Total bilirubin 1.6. Direct Billirubin .4   --Medicine team recommending, RUQ US performed ,   - Follow up LFTS in morning     # Urinary retention  - Pt retaining urine s/p straight cath x 2. Continues to intermittently be retaining urine.   - Continue to monitor, will place soto if retains again today  - Currently no issues with retaining 12-23 or 12-24, urine dark on 12/24 with slight increase WBC 11, afebrile pt denying any symptoms appearing slightly lethargic. Repeat UA and flex sent, low threshold for infectious workup. Plan for blood cultures if patient spike temp. LFTS added on. Start on empiric coverage if culture comes back positive.

## 2022-12-24 NOTE — PROGRESS NOTE ADULT - SUBJECTIVE AND OBJECTIVE BOX
INCOMPLETE    OVERNIGHT EVENTS:  No acute events overnight.    SUBJECTIVE / INTERVAL HPI: Patient seen and examined at bedside.    Denies CP, SOB, dizziness/lightheadedness, palpitations    12 point ROS otherwise negative    VITAL SIGNS:  Vital Signs Last 24 Hrs  T(C): 36.9 (24 Dec 2022 14:00), Max: 37 (23 Dec 2022 17:50)  T(F): 98.4 (24 Dec 2022 14:00), Max: 98.6 (23 Dec 2022 17:50)  HR: 117 (24 Dec 2022 14:15) (111 - 155)  BP: 124/57 (24 Dec 2022 14:15) (101/67 - 124/57)  BP(mean): 84 (24 Dec 2022 06:54) (79 - 87)  RR: 18 (24 Dec 2022 14:15) (16 - 18)  SpO2: 97% (24 Dec 2022 14:15) (92% - 97%)    Parameters below as of 24 Dec 2022 14:15  Patient On (Oxygen Delivery Method): nasal cannula  O2 Flow (L/min): 2        I&O's Summary    23 Dec 2022 07:01  -  24 Dec 2022 07:00  --------------------------------------------------------  IN: 890 mL / OUT: 850 mL / NET: 40 mL    24 Dec 2022 07:01  -  24 Dec 2022 15:06  --------------------------------------------------------  IN: 100 mL / OUT: 0 mL / NET: 100 mL          PHYSICAL EXAM:    General: sitting up in bed, NAD  HEENT: conjunctiva clear; MMM  Neck: supple, no JVD  Cardiovascular: RRR, no murmurs  Respiratory: CTA B/L  Gastrointestinal: soft, NT/ND, +BS  Extremities: WWP, no edema or cyanosis  Vascular: Peripheral pulses palpable 2+ bilaterally/ carotid 2+ b/l, no bruits; radial 2+ b/l; femoral 2+ b/l no bruits; DP/PT 2+ b/l  Neurological: AAOx3, no focal deficits    MEDICATIONS:  MEDICATIONS  (STANDING):  aMIOdarone    Tablet 200 milliGRAM(s) Oral three times a day  apixaban 5 milliGRAM(s) Oral every 12 hours  atorvastatin 20 milliGRAM(s) Oral at bedtime  furosemide   Injectable 40 milliGRAM(s) IV Push every 8 hours  influenza  Vaccine (HIGH DOSE) 0.7 milliLiter(s) IntraMuscular once  levothyroxine 100 MICROGram(s) Oral daily  metoprolol tartrate 100 milliGRAM(s) Oral two times a day  senna 2 Tablet(s) Oral at bedtime    MEDICATIONS  (PRN):  ALPRAZolam 0.25 milliGRAM(s) Oral daily PRN Anxiety  sodium chloride 0.65% Nasal 1 Spray(s) Both Nostrils daily PRN Nasal Congestion      LABS:                        11.5   11.11 )-----------( 172      ( 24 Dec 2022 06:30 )             36.2       12-24    131<L>  |  93<L>  |  18  ----------------------------<  132<H>  4.2   |  30  |  0.55    Ca    9.0      24 Dec 2022 06:30  Mg     1.8     12-24                  TELEMETRY:    RADIOLOGY & ADDITIONAL TESTS: Reviewed. OVERNIGHT EVENTS:  No acute events overnight.    SUBJECTIVE / INTERVAL HPI: Patient seen and examined at bedside.    Denies CP, SOB, dizziness/lightheadedness, palpitations    12 point ROS otherwise negative    VITAL SIGNS:  Vital Signs Last 24 Hrs  T(C): 36.9 (24 Dec 2022 14:00), Max: 37 (23 Dec 2022 17:50)  T(F): 98.4 (24 Dec 2022 14:00), Max: 98.6 (23 Dec 2022 17:50)  HR: 117 (24 Dec 2022 14:15) (111 - 155)  BP: 124/57 (24 Dec 2022 14:15) (101/67 - 124/57)  BP(mean): 84 (24 Dec 2022 06:54) (79 - 87)  RR: 18 (24 Dec 2022 14:15) (16 - 18)  SpO2: 97% (24 Dec 2022 14:15) (92% - 97%)    Parameters below as of 24 Dec 2022 14:15  Patient On (Oxygen Delivery Method): nasal cannula  O2 Flow (L/min): 2        I&O's Summary    23 Dec 2022 07:01  -  24 Dec 2022 07:00  --------------------------------------------------------  IN: 890 mL / OUT: 850 mL / NET: 40 mL    24 Dec 2022 07:01  -  24 Dec 2022 15:06  --------------------------------------------------------  IN: 100 mL / OUT: 0 mL / NET: 100 mL          PHYSICAL EXAM:    General: sitting up in bed, NAD  HEENT: conjunctiva clear; MMM  Neck: supple, no JVD  Cardiovascular: RRR, no murmurs  Respiratory: CTA B/L  Gastrointestinal: soft, NT/ND, +BS  Extremities: WWP, no edema or cyanosis  Vascular: Peripheral pulses palpable 2+ bilaterally/ carotid 2+ b/l, no bruits; radial 2+ b/l; femoral 2+ b/l no bruits; DP/PT 2+ b/l  Neurological: AAOx3, no focal deficits    MEDICATIONS:  MEDICATIONS  (STANDING):  aMIOdarone    Tablet 200 milliGRAM(s) Oral three times a day  apixaban 5 milliGRAM(s) Oral every 12 hours  atorvastatin 20 milliGRAM(s) Oral at bedtime  furosemide   Injectable 40 milliGRAM(s) IV Push every 8 hours  influenza  Vaccine (HIGH DOSE) 0.7 milliLiter(s) IntraMuscular once  levothyroxine 100 MICROGram(s) Oral daily  metoprolol tartrate 100 milliGRAM(s) Oral two times a day  senna 2 Tablet(s) Oral at bedtime    MEDICATIONS  (PRN):  ALPRAZolam 0.25 milliGRAM(s) Oral daily PRN Anxiety  sodium chloride 0.65% Nasal 1 Spray(s) Both Nostrils daily PRN Nasal Congestion      LABS:                        11.5   11.11 )-----------( 172      ( 24 Dec 2022 06:30 )             36.2       12-24    131<L>  |  93<L>  |  18  ----------------------------<  132<H>  4.2   |  30  |  0.55    Ca    9.0      24 Dec 2022 06:30  Mg     1.8     12-24                  TELEMETRY:    RADIOLOGY & ADDITIONAL TESTS: Reviewed.

## 2022-12-24 NOTE — PROGRESS NOTE ADULT - SUBJECTIVE AND OBJECTIVE BOX
Patient seen and examined at bedside.    Overnight Events:  No events. remains in AF with elevated rates    Review Of Systems: No chest pain, shortness of breath, or palpitations            Current Meds:  ALPRAZolam 0.25 milliGRAM(s) Oral daily PRN  aMIOdarone    Tablet 200 milliGRAM(s) Oral three times a day  apixaban 5 milliGRAM(s) Oral every 12 hours  atorvastatin 20 milliGRAM(s) Oral at bedtime  furosemide   Injectable 40 milliGRAM(s) IV Push every 8 hours  influenza  Vaccine (HIGH DOSE) 0.7 milliLiter(s) IntraMuscular once  iron sucrose IVPB 300 milliGRAM(s) IV Intermittent <User Schedule>  levothyroxine 100 MICROGram(s) Oral daily  metoprolol tartrate 100 milliGRAM(s) Oral two times a day  senna 2 Tablet(s) Oral at bedtime  sodium chloride 0.65% Nasal 1 Spray(s) Both Nostrils daily PRN      Vitals:  T(F): 97.5 (12-24), Max: 98.6 (12-23)  HR: 120 (12-24) (116 - 155)  BP: 103/61 (12-24) (101/67 - 144/74)  RR: 18 (12-24)  SpO2: 95% (12-24)  I&O's Summary    23 Dec 2022 07:01  -  24 Dec 2022 07:00  --------------------------------------------------------  IN: 890 mL / OUT: 850 mL / NET: 40 mL        Physical Exam:  Appearance: No acute distress; well appearing  Eyes: PERRL, EOMI, pink conjunctiva  HEENT: Normal oral mucosa  Cardiovascular: tachycardic, irregular, GABRIELE, normal S1, S2, no rubs, or gallops; no edema; no JVD  Respiratory: Clear to auscultation bilaterally  Gastrointestinal: soft, non-tender, non-distended with normal bowel sounds  Neurologic: Non-focal  Lymphatic: No lymphadenopathy  Skin: No rashes, ecchymoses, or cyanosis  Ext: b/l LE edema                        11.5   11.11 )-----------( 172      ( 24 Dec 2022 06:30 )             36.2     12-24    131<L>  |  93<L>  |  18  ----------------------------<  132<H>  4.2   |  30  |  0.55    Ca    9.0      24 Dec 2022 06:30  Mg     1.8     12-24        CARDIAC MARKERS ( 20 Dec 2022 17:07 )  x     / x     / 0.01 ng/mL / 91 U/L / x     / 3.9 ng/mL      Serum Pro-Brain Natriuretic Peptide: 3126 pg/mL (12-20 @ 17:07)        Interpretation of Telemetry: AF, rates 80-150s, mainly 110-120s

## 2022-12-24 NOTE — PROGRESS NOTE ADULT - PROBLEM SELECTOR PLAN 6
--SBP 110s-140's  --CONT: Lopressor 25mg PO q6hrs, Lasix 40mg IV TID.  --HOLD: Valsartan 40mg PO QD to make room for rate control and diuresis --SBP 110s-140's  --CONT: Lopressor 100mg BID    --HOLD: Valsartan 40mg PO QD to make room for rate control and diuresis

## 2022-12-24 NOTE — PROGRESS NOTE ADULT - PROBLEM SELECTOR PLAN 2
--AFib w/ RVR w/ HR in 110s-120s.   --Reports ablation w/ Dr. Moon > 10yrs ago.   --HOLD home Atenolol 25mg PO QD.   - Started amio 200mg PO TID, if not affecting heart rate plan for DCCV vs ablation with micra PPM early next week per EP recs  - EP following appreciate recs   --CONT: Eliquis 5mg PO BID, C/w Lopressor increased to 100mg BID,    --PLAN: EP consulted - continue to follow up recs --AFib w/ RVR w/ HR in 110s-120s.   --Reports ablation w/ Dr. Moon > 10yrs ago.   --HOLD home Atenolol 25mg PO QD.   - Started amio 200mg PO TID, if not affecting heart rate plan for DCCV vs ablation with micra PPM early next week per EP recs  - EP following appreciate recs   --CONT: Eliquis 5mg PO BID, C/w Lopressor 100mg BID,    --PLAN: EP consulted - continue to follow up recs

## 2022-12-24 NOTE — PROGRESS NOTE ADULT - SUBJECTIVE AND OBJECTIVE BOX
Subjective:      Objective:     Physical Exam:  -Gen: NAD, resting in bed, pleasant  -HEENT: EOMI, PERRL, no JVD, no bruits  -CV: irregular and tachycardic, no murmurs appreciated   -Lungs: bibasilar crackles, normal respiratory effort on NC  -Ab: mild distension, soft, NT, normal BS  -Ext: significant bilateral pitting edema, wrapped in ACE wraps  -Neuro: A&O x 3, no focal deficits    Subjective:    grandson and daughter by bedside  patient denies worsening SOB  she reports she had an "ok " night and does not recall if she had any palpitations; family reports she was feeling light headed last night    Objective:     Physical Exam:  -Gen: NAD, resting in bed, pleasant  -HEENT: EOMI, PERRL, no JVD, no bruits  -CV: irregular and tachycardic, no murmurs appreciated   -Lungs: bibasilar crackles, normal respiratory effort on NC  -Ab: mild distension, soft, NT, normal BS  -Ext: significant bilateral pitting edema in the thighs, ACE wraps uncovered, improved peripheral edema of the lower legs  -Neuro: A&O x 3, no focal deficits

## 2022-12-24 NOTE — PROGRESS NOTE ADULT - ASSESSMENT
89F PMHx HTN, HLD, Hypothyroidism, TIA, s/p AVR, AFib (on Eliquis, s/p ablation > 10yrs ago), CHF (per outpatient bedside echo EF appears reduced) who presented to [facility] ED on 12/20/22 endorsing worsening LE edema over the past 2 months. Clinically volume overload and in AFib RVR.    #Persistent AFib RVR  -remote hx of ablation possibly 10 years ago  -rates remain predominantly uncontrolled  -c/w Lopressor 100mg BID  -c/w amiodarone 200mg tid (started 12/23/2022)  -will consider more advanced therapies (DCCV, poss AVJ/PPM) if rates remain elevated despite ongoing diuresis and a few more days of amiodarone    Gerardo Polk MD  EP Fellow

## 2022-12-24 NOTE — PROVIDER CONTACT NOTE (OTHER) - ASSESSMENT
bp86/48 Hr 122  R 18 o2 sat 77 on 02 2l nc. Pt with generalized weakness, denies cp sob,dizziness or palpitations

## 2022-12-24 NOTE — CHART NOTE - NSCHARTNOTEFT_GEN_A_CORE
As per day PA, WBC 11 today am. pt more lethargic appearing. Unable to send UA with reflex cx during daytime as pt with diarrhea  2/2 cross contamination with stool.  RLE> LLE; LE duplex ordered by day PA.     Around 8 pm. RN notified PA that pt with 99 temp; rectal temp performed 100.9; pt seen at bedside; laying down comfortably; BP 100s, -140, RR 18 , satting 98 on 2 L O2; Lungs: bibasilar rales noted, RLE > LLE, AO x 2 (name, , year; thinks she is at home but re-directable which has been her baseline as per day nurse at bedside). Pt denies any CP, SOB, abdominal pain, URI/UTI sx. Stat Lactate, CBC with diff, CMP, Blood cx X 2 procal and d-dimer sent. Nurse to re-attempt sending UA with reflex culture. Pt given tylenol 650 PO X 1 , She is also ordered for Vanc 1 gm IV X 1. Zosyn IV X 1 (only to be given after UA with reflex cx sent; nurse aware).  CCU fellow made aware of patient.     of note: pt on eliquis for afib. As per day PA, WBC 11 today am. pt more lethargic appearing. Unable to send UA with reflex cx during daytime as pt with diarrhea  2/2 cross contamination with stool.  RLE> LLE; LE duplex ordered by day PA.     Around 8 pm. RN notified PA that pt with 99 temp; rectal temp performed 100.9; pt seen at bedside; laying down comfortably; BP 100s, -140, RR 18 , satting 98 on 2 L O2; Lungs: bibasilar rales noted, RLE > LLE, AO x 2 (name, , year; thinks she is at home but re-directable which has been her baseline as per day nurse at bedside). Pt denies any CP, SOB, abdominal pain, URI/UTI sx. Stat Lactate, CBC with diff, CMP, Blood cx X 2 procal and d-dimer sent. Nurse to re-attempt sending UA with reflex culture. Pt given tylenol 650 PO X 1 , She is also ordered for Vanc 1 gm IV X 1. Zosyn IV X 1 (only to be given after UA with reflex cx sent; nurse aware).  CCU fellow made aware of patient.     Update: BP soft earlier in the night 70s-80s which has improved to  on own,; -130s;  8 pm labs revealing lactate 1.2, procal 0.13, WBC 15, Hg stable at 11, d-dimer 682; On PE: pt still Ao X 2; warm on touch, stat CXR with worsening congestion, stat Abd US with distended bowels; Bladder scan revealing 1000 cc;  soto placed in and urine output with fecal matter. Surgery consulted stat; recs CT A/p with PO/IV contrast (ordered).    repeat labs from 10 pm- lactate 1.2, K 3.8 and Mg 1.6 which is being supplemented; UA with Ucx sent after soto placed and pt given Vanc and Zosyn X 1.    CCU fellow at bedside; lopressor 100 mg BID d/c; pt started on AMio 150 IV X 1 to be followed by Amio 1 mg/hr X 6 hrs and c/w AMio 0.5 mg/hr X 18 hrs; also given Digoxin 500 IV X 1.     will f/u surgery recs. As per day PA, WBC 11 today am. pt more lethargic appearing. Unable to send UA with reflex cx during daytime as pt with diarrhea  2/2 cross contamination with stool.  RLE> LLE; LE duplex ordered by day PA.     Around 8 pm. RN notified PA that pt with 99 temp; rectal temp performed 100.9; pt seen at bedside; laying down comfortably; BP 100s, -140, RR 18 , satting 98 on 2 L O2; Lungs: bibasilar rales noted, RLE > LLE, AO x 2 (name, , year; thinks she is at home but re-directable which has been her baseline as per day nurse at bedside). Pt denies any CP, SOB, abdominal pain, URI/UTI sx. Stat Lactate, CBC with diff, CMP, Blood cx X 2 procal and d-dimer sent. Nurse to re-attempt sending UA with reflex culture. Pt given tylenol 650 PO X 1 , She is also ordered for Vanc 1 gm IV X 1. Zosyn IV X 1 (only to be given after UA with reflex cx sent; nurse aware).  CCU fellow made aware of patient.     Update: BP soft earlier in the night 70s-80s which has improved to  on own,; -130s;  8 pm labs revealing lactate 1.2, procal 0.13, WBC 15, Hg stable at 11, d-dimer 682; On PE: pt still Ao X 2; warm on touch, stat CXR with worsening congestion, stat Abd US with distended bowels; Bladder scan revealing 1000 cc;  soto placed in and urine output with fecal matter. Surgery consulted stat; recs CT A/p with PO/IV contrast (ordered).    repeat labs from 10 pm- lactate 1.2, K 3.8 and Mg 1.6 which is being supplemented; UA with Ucx sent after soto placed and pt given Vanc and Zosyn X 1.    CCU fellow at bedside; lopressor 100 mg BID d/c; pt started on AMio 150 IV X 1 to be followed by Amio 1 mg/hr X 6 hrs and c/w AMio 0.5 mg/hr X 18 hrs; also given Digoxin 500 IV X 1.     will f/u surgery recs.  Dr. Zepeda made aware of events. As per day PA, WBC 11 today am. pt more lethargic appearing. Unable to send UA with reflex cx during daytime as pt with diarrhea  2/2 cross contamination with stool.  RLE> LLE; LE duplex ordered by day PA.     Around 8 pm. RN notified PA that pt with 99 temp; rectal temp performed 100.9; pt seen at bedside; laying down comfortably; BP 100s, -140, RR 18 , satting 98 on 2 L O2; Lungs: bibasilar rales noted, Abdomen: distended with no pain, RLE > LLE, AO x 2 (name, , year; thinks she is at home but re-directable which has been her baseline as per day nurse at bedside). Pt denies any CP, SOB, abdominal pain, URI/UTI sx. Stat Lactate, CBC with diff, CMP, Blood cx X 2 procal and d-dimer sent. Nurse to re-attempt sending UA with reflex culture. Pt given tylenol 650 PO X 1 , She is also ordered for Vanc 1 gm IV X 1. Zosyn IV X 1 (only to be given after UA with reflex cx sent; nurse aware).  CCU fellow made aware of patient.     Update: BP soft earlier in the night 70s-80s which has improved to  on own,; -130s;  8 pm labs revealing lactate 1.2, procal 0.13, WBC 15, Hg stable at 11, d-dimer 682; On PE: pt still Ao X 2; warm on touch, stat CXR with worsening congestion, stat Abd US with distended bowels; Bladder scan revealing 1000 cc;  soto placed in and urine output with fecal matter. Surgery consulted stat; recs CT A/p with PO/IV contrast (ordered).    repeat labs from 10 pm- lactate 1.2, K 3.8 and Mg 1.6 which is being supplemented; UA with Ucx sent after soto placed and pt given Vanc and Zosyn X 1.    CCU fellow at bedside; lopressor 100 mg BID d/c; pt started on AMio 150 IV X 1 to be followed by Amio 1 mg/hr X 6 hrs and c/w AMio 0.5 mg/hr X 18 hrs; also given Digoxin 500 IV X 1.     will f/u surgery recs.  Dr. Zepeda made aware of events.

## 2022-12-25 DIAGNOSIS — N39.0 URINARY TRACT INFECTION, SITE NOT SPECIFIED: ICD-10-CM

## 2022-12-25 LAB
ALBUMIN SERPL ELPH-MCNC: 2.8 G/DL — LOW (ref 3.3–5)
ALBUMIN SERPL ELPH-MCNC: 2.8 G/DL — LOW (ref 3.3–5)
ALP SERPL-CCNC: 61 U/L — SIGNIFICANT CHANGE UP (ref 40–120)
ALP SERPL-CCNC: 78 U/L — SIGNIFICANT CHANGE UP (ref 40–120)
ALT FLD-CCNC: 21 U/L — SIGNIFICANT CHANGE UP (ref 10–45)
ALT FLD-CCNC: 22 U/L — SIGNIFICANT CHANGE UP (ref 10–45)
ANION GAP SERPL CALC-SCNC: 7 MMOL/L — SIGNIFICANT CHANGE UP (ref 5–17)
ANION GAP SERPL CALC-SCNC: 9 MMOL/L — SIGNIFICANT CHANGE UP (ref 5–17)
APPEARANCE UR: ABNORMAL
APTT BLD: 33.9 SEC — SIGNIFICANT CHANGE UP (ref 27.5–35.5)
AST SERPL-CCNC: 25 U/L — SIGNIFICANT CHANGE UP (ref 10–40)
AST SERPL-CCNC: 35 U/L — SIGNIFICANT CHANGE UP (ref 10–40)
BACTERIA # UR AUTO: ABNORMAL /HPF
BILIRUB SERPL-MCNC: 1.6 MG/DL — HIGH (ref 0.2–1.2)
BILIRUB SERPL-MCNC: 1.6 MG/DL — HIGH (ref 0.2–1.2)
BILIRUB UR-MCNC: NEGATIVE — SIGNIFICANT CHANGE UP
BUN SERPL-MCNC: 20 MG/DL — SIGNIFICANT CHANGE UP (ref 7–23)
CALCIUM SERPL-MCNC: 8.6 MG/DL — SIGNIFICANT CHANGE UP (ref 8.4–10.5)
CHLORIDE SERPL-SCNC: 92 MMOL/L — LOW (ref 96–108)
CHLORIDE SERPL-SCNC: 93 MMOL/L — LOW (ref 96–108)
CK SERPL-CCNC: 33 U/L — SIGNIFICANT CHANGE UP (ref 25–170)
CO2 SERPL-SCNC: 30 MMOL/L — SIGNIFICANT CHANGE UP (ref 22–31)
COLOR SPEC: YELLOW — SIGNIFICANT CHANGE UP
CREAT SERPL-MCNC: 0.67 MG/DL — SIGNIFICANT CHANGE UP (ref 0.5–1.3)
DIFF PNL FLD: ABNORMAL
EGFR: 80 ML/MIN/1.73M2 — SIGNIFICANT CHANGE UP
EPI CELLS # UR: SIGNIFICANT CHANGE UP /HPF (ref 0–5)
GLUCOSE SERPL-MCNC: 120 MG/DL — HIGH (ref 70–99)
GLUCOSE UR QL: NEGATIVE — SIGNIFICANT CHANGE UP
HCT VFR BLD CALC: 35.4 % — SIGNIFICANT CHANGE UP (ref 34.5–45)
HGB BLD-MCNC: 11 G/DL — LOW (ref 11.5–15.5)
KETONES UR-MCNC: NEGATIVE — SIGNIFICANT CHANGE UP
LEUKOCYTE ESTERASE UR-ACNC: ABNORMAL
MAGNESIUM SERPL-MCNC: 2 MG/DL — SIGNIFICANT CHANGE UP (ref 1.6–2.6)
MCHC RBC-ENTMCNC: 24.3 PG — LOW (ref 27–34)
MCHC RBC-ENTMCNC: 31.1 GM/DL — LOW (ref 32–36)
MCV RBC AUTO: 78.1 FL — LOW (ref 80–100)
NITRITE UR-MCNC: NEGATIVE — SIGNIFICANT CHANGE UP
NRBC # BLD: 0 /100 WBCS — SIGNIFICANT CHANGE UP (ref 0–0)
NT-PROBNP SERPL-SCNC: 5591 PG/ML — HIGH (ref 0–300)
PH UR: 8 — SIGNIFICANT CHANGE UP (ref 5–8)
PLATELET # BLD AUTO: 184 K/UL — SIGNIFICANT CHANGE UP (ref 150–400)
POTASSIUM SERPL-MCNC: 3.8 MMOL/L — SIGNIFICANT CHANGE UP (ref 3.5–5.3)
POTASSIUM SERPL-MCNC: 4.2 MMOL/L — SIGNIFICANT CHANGE UP (ref 3.5–5.3)
POTASSIUM SERPL-SCNC: 3.8 MMOL/L — SIGNIFICANT CHANGE UP (ref 3.5–5.3)
POTASSIUM SERPL-SCNC: 4.2 MMOL/L — SIGNIFICANT CHANGE UP (ref 3.5–5.3)
PROT SERPL-MCNC: 5.8 G/DL — LOW (ref 6–8.3)
PROT SERPL-MCNC: 5.9 G/DL — LOW (ref 6–8.3)
PROT UR-MCNC: 100 MG/DL
RBC # BLD: 4.53 M/UL — SIGNIFICANT CHANGE UP (ref 3.8–5.2)
RBC # FLD: 16.8 % — HIGH (ref 10.3–14.5)
RBC CASTS # UR COMP ASSIST: > 10 /HPF
SODIUM SERPL-SCNC: 131 MMOL/L — LOW (ref 135–145)
SODIUM SERPL-SCNC: 131 MMOL/L — LOW (ref 135–145)
SP GR SPEC: 1.02 — SIGNIFICANT CHANGE UP (ref 1–1.03)
UROBILINOGEN FLD QL: 0.2 E.U./DL — SIGNIFICANT CHANGE UP
WBC # BLD: 13.27 K/UL — HIGH (ref 3.8–10.5)
WBC # FLD AUTO: 13.27 K/UL — HIGH (ref 3.8–10.5)
WBC UR QL: > 10 /HPF

## 2022-12-25 PROCEDURE — 74177 CT ABD & PELVIS W/CONTRAST: CPT | Mod: 26

## 2022-12-25 PROCEDURE — 99232 SBSQ HOSP IP/OBS MODERATE 35: CPT

## 2022-12-25 PROCEDURE — 71045 X-RAY EXAM CHEST 1 VIEW: CPT | Mod: 26

## 2022-12-25 PROCEDURE — 93971 EXTREMITY STUDY: CPT | Mod: 26,RT

## 2022-12-25 PROCEDURE — 99233 SBSQ HOSP IP/OBS HIGH 50: CPT

## 2022-12-25 RX ORDER — AMIODARONE HYDROCHLORIDE 400 MG/1
150 TABLET ORAL ONCE
Refills: 0 | Status: COMPLETED | OUTPATIENT
Start: 2022-12-25 | End: 2022-12-25

## 2022-12-25 RX ORDER — HEPARIN SODIUM 5000 [USP'U]/ML
INJECTION INTRAVENOUS; SUBCUTANEOUS
Qty: 25000 | Refills: 0 | Status: DISCONTINUED | OUTPATIENT
Start: 2022-12-25 | End: 2022-12-27

## 2022-12-25 RX ORDER — POTASSIUM CHLORIDE 20 MEQ
20 PACKET (EA) ORAL ONCE
Refills: 0 | Status: COMPLETED | OUTPATIENT
Start: 2022-12-25 | End: 2022-12-25

## 2022-12-25 RX ORDER — CEFTRIAXONE 500 MG/1
INJECTION, POWDER, FOR SOLUTION INTRAMUSCULAR; INTRAVENOUS
Refills: 0 | Status: DISCONTINUED | OUTPATIENT
Start: 2022-12-25 | End: 2022-12-29

## 2022-12-25 RX ORDER — AMIODARONE HYDROCHLORIDE 400 MG/1
0.5 TABLET ORAL
Qty: 900 | Refills: 0 | Status: DISCONTINUED | OUTPATIENT
Start: 2022-12-25 | End: 2022-12-26

## 2022-12-25 RX ORDER — AMIODARONE HYDROCHLORIDE 400 MG/1
200 TABLET ORAL DAILY
Refills: 0 | Status: DISCONTINUED | OUTPATIENT
Start: 2022-12-26 | End: 2023-01-03

## 2022-12-25 RX ORDER — AMIODARONE HYDROCHLORIDE 400 MG/1
1 TABLET ORAL
Qty: 900 | Refills: 0 | Status: DISCONTINUED | OUTPATIENT
Start: 2022-12-25 | End: 2022-12-26

## 2022-12-25 RX ORDER — CEFTRIAXONE 500 MG/1
1000 INJECTION, POWDER, FOR SOLUTION INTRAMUSCULAR; INTRAVENOUS ONCE
Refills: 0 | Status: COMPLETED | OUTPATIENT
Start: 2022-12-25 | End: 2022-12-25

## 2022-12-25 RX ORDER — APIXABAN 2.5 MG/1
5 TABLET, FILM COATED ORAL EVERY 12 HOURS
Refills: 0 | Status: DISCONTINUED | OUTPATIENT
Start: 2022-12-25 | End: 2022-12-25

## 2022-12-25 RX ORDER — CEFTRIAXONE 500 MG/1
1000 INJECTION, POWDER, FOR SOLUTION INTRAMUSCULAR; INTRAVENOUS EVERY 24 HOURS
Refills: 0 | Status: DISCONTINUED | OUTPATIENT
Start: 2022-12-26 | End: 2022-12-29

## 2022-12-25 RX ORDER — DIATRIZOATE MEGLUMINE 180 MG/ML
30 INJECTION, SOLUTION INTRAVESICAL ONCE
Refills: 0 | Status: COMPLETED | OUTPATIENT
Start: 2022-12-25 | End: 2022-12-25

## 2022-12-25 RX ORDER — HEPARIN SODIUM 5000 [USP'U]/ML
6000 INJECTION INTRAVENOUS; SUBCUTANEOUS EVERY 6 HOURS
Refills: 0 | Status: DISCONTINUED | OUTPATIENT
Start: 2022-12-25 | End: 2022-12-26

## 2022-12-25 RX ORDER — IOHEXOL 300 MG/ML
30 INJECTION, SOLUTION INTRAVENOUS ONCE
Refills: 0 | Status: DISCONTINUED | OUTPATIENT
Start: 2022-12-25 | End: 2022-12-25

## 2022-12-25 RX ORDER — HEPARIN SODIUM 5000 [USP'U]/ML
3000 INJECTION INTRAVENOUS; SUBCUTANEOUS EVERY 6 HOURS
Refills: 0 | Status: DISCONTINUED | OUTPATIENT
Start: 2022-12-25 | End: 2022-12-26

## 2022-12-25 RX ORDER — DIGOXIN 250 MCG
500 TABLET ORAL ONCE
Refills: 0 | Status: DISCONTINUED | OUTPATIENT
Start: 2022-12-25 | End: 2022-12-25

## 2022-12-25 RX ORDER — PHENYLEPHRINE HYDROCHLORIDE 10 MG/ML
0.1 INJECTION INTRAVENOUS
Qty: 40 | Refills: 0 | Status: DISCONTINUED | OUTPATIENT
Start: 2022-12-25 | End: 2022-12-25

## 2022-12-25 RX ORDER — FUROSEMIDE 40 MG
40 TABLET ORAL THREE TIMES A DAY
Refills: 0 | Status: DISCONTINUED | OUTPATIENT
Start: 2022-12-25 | End: 2022-12-25

## 2022-12-25 RX ORDER — DIGOXIN 250 MCG
500 TABLET ORAL ONCE
Refills: 0 | Status: COMPLETED | OUTPATIENT
Start: 2022-12-25 | End: 2022-12-25

## 2022-12-25 RX ORDER — MAGNESIUM SULFATE 500 MG/ML
2 VIAL (ML) INJECTION ONCE
Refills: 0 | Status: COMPLETED | OUTPATIENT
Start: 2022-12-25 | End: 2022-12-25

## 2022-12-25 RX ORDER — FUROSEMIDE 40 MG
40 TABLET ORAL
Refills: 0 | Status: DISCONTINUED | OUTPATIENT
Start: 2022-12-25 | End: 2022-12-29

## 2022-12-25 RX ORDER — PIPERACILLIN AND TAZOBACTAM 4; .5 G/20ML; G/20ML
3.38 INJECTION, POWDER, LYOPHILIZED, FOR SOLUTION INTRAVENOUS EVERY 8 HOURS
Refills: 0 | Status: DISCONTINUED | OUTPATIENT
Start: 2022-12-25 | End: 2022-12-25

## 2022-12-25 RX ADMIN — AMIODARONE HYDROCHLORIDE 600 MILLIGRAM(S): 400 TABLET ORAL at 02:57

## 2022-12-25 RX ADMIN — Medication 500 MICROGRAM(S): at 01:48

## 2022-12-25 RX ADMIN — Medication 100 MICROGRAM(S): at 06:15

## 2022-12-25 RX ADMIN — APIXABAN 5 MILLIGRAM(S): 2.5 TABLET, FILM COATED ORAL at 13:55

## 2022-12-25 RX ADMIN — Medication 40 MILLIGRAM(S): at 14:13

## 2022-12-25 RX ADMIN — Medication 0.25 MILLIGRAM(S): at 23:44

## 2022-12-25 RX ADMIN — AMIODARONE HYDROCHLORIDE 33.3 MG/MIN: 400 TABLET ORAL at 03:13

## 2022-12-25 RX ADMIN — Medication 20 MILLIEQUIVALENT(S): at 01:50

## 2022-12-25 RX ADMIN — CEFTRIAXONE 100 MILLIGRAM(S): 500 INJECTION, POWDER, FOR SOLUTION INTRAMUSCULAR; INTRAVENOUS at 13:15

## 2022-12-25 RX ADMIN — Medication 250 MILLIGRAM(S): at 00:35

## 2022-12-25 RX ADMIN — AMIODARONE HYDROCHLORIDE 16.7 MG/MIN: 400 TABLET ORAL at 09:10

## 2022-12-25 RX ADMIN — HEPARIN SODIUM 1300 UNIT(S)/HR: 5000 INJECTION INTRAVENOUS; SUBCUTANEOUS at 21:25

## 2022-12-25 RX ADMIN — DIATRIZOATE MEGLUMINE 30 MILLILITER(S): 180 INJECTION, SOLUTION INTRAVESICAL at 03:02

## 2022-12-25 RX ADMIN — PIPERACILLIN AND TAZOBACTAM 200 GRAM(S): 4; .5 INJECTION, POWDER, LYOPHILIZED, FOR SOLUTION INTRAVENOUS at 01:48

## 2022-12-25 RX ADMIN — Medication 40 MILLIGRAM(S): at 06:15

## 2022-12-25 RX ADMIN — ATORVASTATIN CALCIUM 20 MILLIGRAM(S): 80 TABLET, FILM COATED ORAL at 21:26

## 2022-12-25 RX ADMIN — Medication 150 GRAM(S): at 01:50

## 2022-12-25 NOTE — PROGRESS NOTE ADULT - PROBLEM SELECTOR PLAN 1
--Presented w/ volume overload w/ marked LE edema up to thighs. No respiratory symptoms.   --TTE (12/21/22): LVEF 55-60%, mod-severe sLVH, ESTER, mild AR, mod MR, mod-severe TR, PASP 79mmHg, trivial pericardial effusion, L pleural effusion.   --I/O: -1.8L/24hr, -6L/total.  --Diuresis changed from TID to BID dosing 12/25/22.   --CONT: Lasix 40mg IV BID.   --PLAN: continue IV diuresis.

## 2022-12-25 NOTE — PROGRESS NOTE ADULT - PROBLEM SELECTOR PLAN 8
--Chol 125, TG 68, HDL 59, LDL 52.   --CONT: Atorvastatin 20mg PO QHS      DVT ppx: Eliquis  Dispo: pending euvolemia/rate control  PT Eval: pending  Full code --Chol 125, TG 68, HDL 59, LDL 52.   --CONT: Atorvastatin 20mg PO QHS      DVT ppx: Eliquis  Dispo: pending euvolemia/rate control/urinary work up.  PT Eval: pending  Full code

## 2022-12-25 NOTE — PROGRESS NOTE ADULT - SUBJECTIVE AND OBJECTIVE BOX
Overnight Events:  No events. remains in AF with elevated rates    Review Of Systems: No chest pain, shortness of breath, or palpitations            Current Meds:  ALPRAZolam 0.25 milliGRAM(s) Oral daily PRN  aMIOdarone    Tablet 200 milliGRAM(s) Oral three times a day  apixaban 5 milliGRAM(s) Oral every 12 hours  atorvastatin 20 milliGRAM(s) Oral at bedtime  furosemide   Injectable 40 milliGRAM(s) IV Push every 8 hours  influenza  Vaccine (HIGH DOSE) 0.7 milliLiter(s) IntraMuscular once  iron sucrose IVPB 300 milliGRAM(s) IV Intermittent <User Schedule>  levothyroxine 100 MICROGram(s) Oral daily  metoprolol tartrate 100 milliGRAM(s) Oral two times a day  senna 2 Tablet(s) Oral at bedtime  sodium chloride 0.65% Nasal 1 Spray(s) Both Nostrils daily PRN      Vitals:  T(F): 97.5 (12-24), Max: 98.6 (12-23)  HR: 120 (12-24) (116 - 155)  BP: 103/61 (12-24) (101/67 - 144/74)  RR: 18 (12-24)  SpO2: 95% (12-24)  I&O's Summary    23 Dec 2022 07:01  -  24 Dec 2022 07:00  --------------------------------------------------------  IN: 890 mL / OUT: 850 mL / NET: 40 mL        Physical Exam:  Appearance: No acute distress; well appearing  Eyes: PERRL, EOMI, pink conjunctiva  HEENT: Normal oral mucosa  Cardiovascular: tachycardic, irregular, GABRIELE, normal S1, S2, no rubs, or gallops; no edema; no JVD  Respiratory: Clear to auscultation bilaterally  Gastrointestinal: soft, non-tender, non-distended with normal bowel sounds  Neurologic: Non-focal  Lymphatic: No lymphadenopathy  Skin: No rashes, ecchymoses, or cyanosis  Ext: b/l LE edema                        11.5   11.11 )-----------( 172      ( 24 Dec 2022 06:30 )             36.2     12-24    131<L>  |  93<L>  |  18  ----------------------------<  132<H>  4.2   |  30  |  0.55    Ca    9.0      24 Dec 2022 06:30  Mg     1.8     12-24        CARDIAC MARKERS ( 20 Dec 2022 17:07 )  x     / x     / 0.01 ng/mL / 91 U/L / x     / 3.9 ng/mL      Serum Pro-Brain Natriuretic Peptide: 3126 pg/mL (12-20 @ 17:07)        Interpretation of Telemetry: AF, rates 80-150s, mainly 110-120s

## 2022-12-25 NOTE — CONSULT NOTE ADULT - SUBJECTIVE AND OBJECTIVE BOX
Surgery Consult Note    HPI:  Pt is 89yo F w/ PMHx of HTN, HLD, Hypothyroidism, TIA, s/p AVR, AFib (on Eliquis, s/p ablation > 10yrs ago), CHF (per outpatient bedside echo EF appears reduced) who presented to [facility] ED on 22 endorsing worsening LE edema over the past 2 months. Pt's outpatient cardiologist, Dr. David, sent her to [facility], at which time a bedside limited echo was done showing a possibly reduced LVEF. Pt found to have volume overload peripherally and in AFib w/ RVR (HR 100s). Pt denies CP, SOB, palpitations, dizziness, orthopnea, N/V, fever/chills, syncope, PND.     VITALS: HR 100s, -120/66-90, SpO2 98% on RA, RR 18, T 97.5F.   LABS: WBC 6.95, Hgb/Hct 11.5/36.3, Plt Cnt 196, Na 132, K 4.5, BUN/Cr 13/0.61, Trop T 0.01, CK 91, CKMB 39, BNP 3126.     TREATMENT IN ED: Lasix 40mg IV x1.     Patient admitted to cardiology for heart failure exacerbation and atrial fibrillation.  (20 Dec 2022 18:00)      Attending:  Dr. Mora    Surgery Addendum: Patient with history as stated above. 89yo F w/ PMHx of HTN, HLD, Hypothyroidism, TIA, s/p AVR, AFib (on Eliquis, s/p ablation > 10yrs ago), CHF (per outpatient bedside echo EF appears reduced) who is admitted to [facility] with CHF exacerbation. General surgery was consulted for fecaluria and abd Xray findings with possible obstruction of small bowel. Patient was retaining urine and noted to have fecaluria with soto placement. Aid at bedside reports that patient's abdomen has been distended since coming to the hospital. Patient had several episodes of diarrhea for the past 2 days - but according to aid she was constipated previously and had received a stool softener. Patient denies any abdominal pain, denies nausea/vomiting. Denies fevers/chills. Patient does not rememebr any hx of surgeries except for hysterectomy and does not remember ever being diagnosed with diverticulitis, doesn't remember when she had a colonoscopy.        PAST MEDICAL & SURGICAL HISTORY:  HTN (hypertension)      HLD (hyperlipidemia)      Congestive heart failure (CHF)      TIA (transient ischemic attack)      Hypothyroidism      Atrial fibrillation          MEDICATIONS  (STANDING):  aMIOdarone Infusion 1 mG/Min (33.3 mL/Hr) IV Continuous <Continuous>  aMIOdarone Infusion 0.5 mG/Min (16.7 mL/Hr) IV Continuous <Continuous>  aMIOdarone IVPB 150 milliGRAM(s) IV Intermittent once  apixaban 5 milliGRAM(s) Oral every 12 hours  atorvastatin 20 milliGRAM(s) Oral at bedtime  diatrizoate meglumine/diatrizoate sodium. 30 milliLiter(s) Oral once  furosemide   Injectable 40 milliGRAM(s) IV Push two times a day  influenza  Vaccine (HIGH DOSE) 0.7 milliLiter(s) IntraMuscular once  levothyroxine 100 MICROGram(s) Oral daily    MEDICATIONS  (PRN):  ALPRAZolam 0.25 milliGRAM(s) Oral daily PRN Anxiety  sodium chloride 0.65% Nasal 1 Spray(s) Both Nostrils daily PRN Nasal Congestion      Allergies    No Known Allergies    Intolerances        SOCIAL HISTORY:    FAMILY HISTORY:      Vital Signs Last 24 Hrs  T(C): 37.2 (24 Dec 2022 21:40), Max: 38.3 (24 Dec 2022 20:00)  T(F): 99 (24 Dec 2022 21:40), Max: 100.9 (24 Dec 2022 20:00)  HR: 124 (24 Dec 2022 21:40) (111 - 154)  BP: 84/44 (24 Dec 2022 21:40) (84/44 - 124/57)  BP(mean): 54 (24 Dec 2022 21:40) (54 - 84)  RR: 17 (24 Dec 2022 21:40) (16 - 19)  SpO2: 96% (24 Dec 2022 21:40) (93% - 97%)    Parameters below as of 24 Dec 2022 21:40  Patient On (Oxygen Delivery Method): nasal cannula  O2 Flow (L/min): 2      I&O's Summary    23 Dec 2022 07:01  -  24 Dec 2022 07:00  --------------------------------------------------------  IN: 890 mL / OUT: 850 mL / NET: 40 mL    24 Dec 2022 07:01  -  25 Dec 2022 02:26  --------------------------------------------------------  IN: 100 mL / OUT: 400 mL / NET: -300 mL        Physical Exam:  General: NAD, resting comfortably  HEENT: NC/AT,   Pulmonary: normal resp effort,   Cardiovascular: NSR,   Abdominal: soft, distended, tender in LLQ/suprapubic region  Soto: dark urine, fecal odor  HEIDI: soft stool in rectal vault, no blood  Extremities: WWP  Neuro: A/O x 3, CNs II-XII grossly intact, normal sensation, no focal deficits      Lines/drains/tubes:    LABS:                        11.3   15.43 )-----------( 197      ( 24 Dec 2022 20:24 )             35.3     12-    131<L>  |  93<L>  |  19  ----------------------------<  148<H>  3.8   |  31  |  0.65    Ca    8.8      24 Dec 2022 23:17  Mg     1.6     -    TPro  5.8<L>  /  Alb  2.8<L>  /  TBili  1.6<H>  /  DBili  x   /  AST  25  /  ALT  21  /  AlkPhos  61  12-      Urinalysis Basic - ( 25 Dec 2022 00:43 )    Color: Yellow / Appearance: Cloudy / S.020 / pH: x  Gluc: x / Ketone: NEGATIVE  / Bili: Negative / Urobili: 0.2 E.U./dL   Blood: x / Protein: 100 mg/dL / Nitrite: NEGATIVE   Leuk Esterase: Moderate / RBC: > 10 /HPF / WBC > 10 /HPF   Sq Epi: x / Non Sq Epi: 0-5 /HPF / Bacteria: Many /HPF      CAPILLARY BLOOD GLUCOSE        LIVER FUNCTIONS - ( 24 Dec 2022 23:17 )  Alb: 2.8 g/dL / Pro: 5.8 g/dL / ALK PHOS: 61 U/L / ALT: 21 U/L / AST: 25 U/L / GGT: x             Cultures:      RADIOLOGY & ADDITIONAL STUDIES:

## 2022-12-25 NOTE — PROGRESS NOTE ADULT - ASSESSMENT
89 yo F w/ PMHx of HTN, HLD, Hypothyroidism, TIA, s/p AVR, AFib (on Eliquis, s/p ablation > 10yrs ago), chronic dCHF (EF 55-60% by ECHO 12/21/22) who presented w/ volume overload w/ worsening LE edema over the past 2 months as well as AFib w/ RVR. EP consulted and will address further rhythm management once patient euvolemic. Pt being diuresed with Lasix 40mg IV TID and started on amio 200mg TID and increase Lopressor to 100mg BID. Plan for continued diuresis and possible DCCV vs Ablation with micra PPM early next week keep NPO after midnight for Monday 12/26 per EP.       89 yo F w/ PMHx of HTN, HLD, Hypothyroidism, TIA, s/p AVR, AFib (on Eliquis, s/p ablation > 10yrs ago), chronic dCHF (EF 55-60% by ECHO 12/21/22) who presented w/ volume overload w/ worsening LE edema over the past 2 months as well as AFib w/ RVR. EP consulted and will address further rhythm management once patient euvolemic. Decreased diuresis to Lasix 40mg IV BID (from TID dosing) on 12/25/22. Lopressor discontinued and patient started on Amiodarone gtt 12/25/22. Pt w/ soto placement showing fecal material in urine - surgery consulted and CT abd/pelv w/ IV/PO contrast obtained (read pending) -- per surgery no acute intervention at this time. Pt being treated for UTI w/ Ceftriaxone x7d. Per EP, tentatively planning for DCCV Tuesday 12/27/22, and possible AV ablation w/ PPM placement (decision to be determined - continue to follow up EP recs).

## 2022-12-25 NOTE — PROGRESS NOTE ADULT - SUBJECTIVE AND OBJECTIVE BOX
Subjective:    T 100.9 overnight and she appeared more lethargic  UA and Ucx were obtained    Objective:     Physical Exam:  -Gen: NAD, resting in bed, pleasant  -HEENT: EOMI, PERRL, no JVD, no bruits  -CV: irregular and tachycardic, no murmurs appreciated   -Lungs: bibasilar crackles, normal respiratory effort on NC  -Ab: mild distension, soft, NT, normal BS  -Ext: significant bilateral pitting edema in the thighs, ACE wraps uncovered, improved peripheral edema of the lower legs  -Neuro: A&O x 3, no focal deficits

## 2022-12-25 NOTE — PROGRESS NOTE ADULT - PROBLEM SELECTOR PLAN 5
--CONT: Levothyroxine 100mcg PO QD. --Iron studies most c/w CARRILLO   --Initially stated IV iron per medicine, but discontinued in setting of infection. Recommend PO iron on discharge.

## 2022-12-25 NOTE — PROGRESS NOTE ADULT - PROBLEM SELECTOR PLAN 2
--AFib w/ RVR w/ HR in 110s-120s.   --Reports ablation w/ Dr. Moon > 10yrs ago.   --HOLD home Atenolol 25mg PO QD.   - Started amio 200mg PO TID, if not affecting heart rate plan for DCCV vs ablation with micra PPM early next week per EP recs  - EP following appreciate recs   --CONT: Eliquis 5mg PO BID, C/w Lopressor 100mg BID,    --PLAN: EP consulted - continue to follow up recs --AFib w/ RVR w/ HR in 110s-120s.   --Reports ablation w/ Dr. Moon > 10yrs ago.   --HOLD home Atenolol 25mg PO QD.   --12/24/22 PM - pt remained uncontrolled, discontinued Lopressor and initiated Amiodarone gtt; s/p Digoxin 500mcg IV x1 -- plan to continue w/ PO Amiodarone once Amiodarone gtt x24hrs completed.   --CONT: Eliquis 5mg PO BID, Amiodarone gtt - start Amiodarone 200mg PO QD once amio gtt x24hrs completed.   --HR: 90s.   --EP following - appreciate recs. --AFib w/ RVR w/ HR in 110s-120s.   --Reports ablation w/ Dr. Moon > 10yrs ago.   --HOLD home Atenolol 25mg PO QD.   --12/24/22 PM - pt remained uncontrolled, discontinued Lopressor and initiated Amiodarone gtt; s/p Digoxin 500mcg IV x1 -- plan to continue w/ PO Amiodarone once Amiodarone gtt x24hrs completed.   --CONT: Eliquis 5mg PO BID, Amiodarone gtt - start Amiodarone 200mg PO QD once amio gtt x24hrs completed.   --HR: 90s.   --EP following - tentatively plan for DCCV Tues 12/27/22; decision for AVN ablation w/ PPM to be determined (continue to follow up with EP recommendations).

## 2022-12-25 NOTE — PROGRESS NOTE ADULT - SUBJECTIVE AND OBJECTIVE BOX
Cardiology PA Adult Progress Note    Subjective Assessment:  	  MEDICATIONS:  aMIOdarone Infusion 1 mG/Min IV Continuous <Continuous>  aMIOdarone Infusion 0.5 mG/Min IV Continuous <Continuous>  furosemide   Injectable 40 milliGRAM(s) IV Push two times a day  ALPRAZolam 0.25 milliGRAM(s) Oral daily PRN  atorvastatin 20 milliGRAM(s) Oral at bedtime  levothyroxine 100 MICROGram(s) Oral daily  apixaban 5 milliGRAM(s) Oral every 12 hours  influenza  Vaccine (HIGH DOSE) 0.7 milliLiter(s) IntraMuscular once  sodium chloride 0.65% Nasal 1 Spray(s) Both Nostrils daily PRN    PHYSICAL EXAM:  TELEMETRY:  T(C): 36.2 (12-25-22 @ 08:00), Max: 38.3 (12-24-22 @ 20:00)  HR: 96 (12-25-22 @ 08:13) (92 - 140)  BP: 105/57 (12-25-22 @ 08:13) (84/44 - 124/57)  RR: 18 (12-25-22 @ 08:13) (17 - 19)  SpO2: 94% (12-25-22 @ 08:13) (94% - 97%)  Wt(kg): --  I&O's Summary    24 Dec 2022 07:01  -  25 Dec 2022 07:00  --------------------------------------------------------  IN: 100 mL / OUT: 1900 mL / NET: -1800 mL    General: sitting up in bed, NAD  HEENT: conjunctiva clear; MMM  Neck: supple, no JVD  Cardiovascular: RRR, no murmurs  Respiratory: CTA B/L  Gastrointestinal: soft, NT/ND, +BS  Extremities: WWP, no edema or cyanosis  Vascular: Peripheral pulses palpable 2+ bilaterally/ carotid 2+ b/l, no bruits; radial 2+ b/l; femoral 2+ b/l no bruits; DP/PT 2+ b/l  Neurological: AAOx3, no focal deficits	    LABS:	 	  CARDIAC MARKERS:                        11.0   13.27 )-----------( 184      ( 25 Dec 2022 07:19 )             35.4     131<L>  |  92<L>  |  20  ----------------------------<  120<H>  4.2   |  30  |  0.67    Ca    8.6      25 Dec 2022 07:19    Mg     2.0     12-25    TPro  5.9<L>  /  Alb  2.8<L>  /  TBili  1.6<H>  /  DBili  x   /  AST  35  /  ALT  22  /  AlkPhos  78  12-25    proBNP: Serum Pro-Brain Natriuretic Peptide: 5591 pg/mL (12-24 @ 23:17)   Cardiology PA Adult Progress Note    Subjective Assessment: Pt feels better today. Per daughter at bedside, patient seems to be more at her baseline and unaltered compared to yesterday.   	  MEDICATIONS:  aMIOdarone Infusion 1 mG/Min IV Continuous <Continuous>  aMIOdarone Infusion 0.5 mG/Min IV Continuous <Continuous>  furosemide   Injectable 40 milliGRAM(s) IV Push two times a day  ALPRAZolam 0.25 milliGRAM(s) Oral daily PRN  atorvastatin 20 milliGRAM(s) Oral at bedtime  levothyroxine 100 MICROGram(s) Oral daily  apixaban 5 milliGRAM(s) Oral every 12 hours  influenza  Vaccine (HIGH DOSE) 0.7 milliLiter(s) IntraMuscular once  sodium chloride 0.65% Nasal 1 Spray(s) Both Nostrils daily PRN    PHYSICAL EXAM:  TELEMETRY:  T(C): 36.2 (12-25-22 @ 08:00), Max: 38.3 (12-24-22 @ 20:00)  HR: 96 (12-25-22 @ 08:13) (92 - 140)  BP: 105/57 (12-25-22 @ 08:13) (84/44 - 124/57)  RR: 18 (12-25-22 @ 08:13) (17 - 19)  SpO2: 94% (12-25-22 @ 08:13) (94% - 97%)  Wt(kg): --  I&O's Summary    24 Dec 2022 07:01  -  25 Dec 2022 07:00  --------------------------------------------------------  IN: 100 mL / OUT: 1900 mL / NET: -1800 mL    General: sitting up in bed, NAD  HEENT: conjunctiva clear; MMM  Neck: supple, no JVD  Cardiovascular: RRR, no murmurs  Respiratory: CTA B/L  Gastrointestinal: soft, NT/ND, +BS  Extremities: WWP, no edema or cyanosis  Vascular: Peripheral pulses palpable 2+ bilaterally/ carotid 2+ b/l, no bruits; radial 2+ b/l; femoral 2+ b/l no bruits; DP/PT 2+ b/l  Neurological: AAOx3, no focal deficits	    LABS:	 	  CARDIAC MARKERS:                        11.0   13.27 )-----------( 184      ( 25 Dec 2022 07:19 )             35.4     131<L>  |  92<L>  |  20  ----------------------------<  120<H>  4.2   |  30  |  0.67    Ca    8.6      25 Dec 2022 07:19    Mg     2.0     12-25    TPro  5.9<L>  /  Alb  2.8<L>  /  TBili  1.6<H>  /  DBili  x   /  AST  35  /  ALT  22  /  AlkPhos  78  12-25    proBNP: Serum Pro-Brain Natriuretic Peptide: 5591 pg/mL (12-24 @ 23:17)

## 2022-12-25 NOTE — CONSULT NOTE ADULT - ASSESSMENT
Surgery Addendum: Patient with history as stated above. 89yo F w/ PMHx of HTN, HLD, Hypothyroidism, TIA, s/p AVR, AFib (on Eliquis, s/p ablation > 10yrs ago), CHF (per outpatient bedside echo EF appears reduced) who is admitted to [facility] with CHF exacerbation. General surgery was consulted for fecaluria and abd Xray findings with possible obstruction of small bowel. Patient is hypotensive and tachycardic - possibly attributed to afib vs infectious etiology. WBC 15, Hb stable (11.3), lactate 1.2. Physical exam concerning for soto with fecaluria as well as abdominal distention with tenderness in LLQ/epigastric region. Differential includes SBO, although unlikely given patient is not clinically obstructed vs diverticulitis with colovesical fistula.     Plan:  - Continue fluid resuscitation per primary  - CT abdomen/pelvic scan with oral and IV contrast when patient is hemodynamically stable (please complete scan 4 hours after PO contrast administered)  - No acute surgical intervention at this time  - Surgery Team 4C will continue to follow. Please page Team 4 with questions/clinical changes. [phone]

## 2022-12-25 NOTE — PROGRESS NOTE ADULT - ASSESSMENT
89F PMHx HTN, HLD, Hypothyroidism, TIA, s/p AVR, AFib (on Eliquis, s/p ablation > 10yrs ago), CHF (per outpatient bedside echo EF appears reduced) who presented to [facility] ED on 12/20/22 endorsing worsening LE edema over the past 2 months. Clinically volume overload and in AFib RVR.    #Persistent AFib RVR  -remote hx of ablation possibly 10 years ago  -rates remain predominantly uncontrolled  -c/w Lopressor 100mg BID  -c/w amiodarone 200mg tid (started 12/23/2022)  -plan for DCCV early this week; will consider PPM/AV node ablation with early recurrence

## 2022-12-25 NOTE — PROGRESS NOTE ADULT - PROBLEM SELECTOR PLAN 3
--Total bilirubin 1.6. Direct Billirubin .4   --Medicine team recommending, RUQ US performed ,   - Follow up LFTS in morning     # Urinary retention  - Pt retaining urine s/p straight cath x 2. Continues to intermittently be retaining urine.   - Continue to monitor, will place soto if retains again today  - Currently no issues with retaining 12-23 or 12-24, urine dark on 12/24 with slight increase WBC 11, afebrile pt denying any symptoms appearing slightly lethargic. Repeat UA and flex sent, low threshold for infectious workup. Plan for blood cultures if patient spike temp. LFTS added on. Start on empiric coverage if culture comes back positive. --12/24/22 PM pt more lethargic and Tmax 100.9;  --s/p Vanc/Zosyn empiric dose x1.   --UCx (+) for E. coli.   --Per discussion w/ Medicine consult team, continue Ceftriaxone 1000mg IV QD x7 days (12/25/22 - 12/31/22).       ## R/O Colovesical Fistula  --12/24/22 PM / 12/25/22 AM -- concern for fecal matter in soto collection.   --Surgery consulted and patient s/p CT Abd/Pelv w/ IV/PO contrast; surgery recommending no acute intervention at this time based on wet read of CT -- **F/U full/final report of CT scan. --12/24/22 PM pt more lethargic and Tmax 100.9;  --s/p Vanc/Zosyn empiric dose x1.   --UCx (+) for E. coli.   --Per discussion w/ Medicine consult team, continue Ceftriaxone 1000mg IV QD x7 days (12/25/22 - 12/31/22).       ## R/O Colovesical Fistula  --12/24/22 PM / 12/25/22 AM -- concern for fecal matter in soto collection.   --Surgery consulted and patient s/p CT Abd/Pelv w/ IV/PO contrast; surgery recommending no acute intervention at this time based on wet read of CT -- **F/U full/final report of CT scan.  --Per discussion w/ surgery team 12/25/22, no acute intervention indicated at this time.

## 2022-12-25 NOTE — PROGRESS NOTE ADULT - PROBLEM SELECTOR PLAN 6
--SBP 110s-140's  --CONT: Lopressor 100mg BID    --HOLD: Valsartan 40mg PO QD to make room for rate control and diuresis --SBP soft overnight 80s-100s.   --HOLD anithypertensives at this time to give room for rate control and diuresis; reassess and reintroduce BP regimen as clinically appropriate. --CONT: Levothyroxine 100mcg PO QD.

## 2022-12-25 NOTE — PROGRESS NOTE ADULT - ASSESSMENT
89-year-old female with a PMHx of HTN, HLD, hypothyroidism, TIA, history of AVR, Afib (s/p remote ablation, on Eliquis) and CHF who presented with acute of chronic decompensated heart failure and Afib with RVR.        #UTI  monitor fever/wbc curve.  WBC 15.43 overnight which improved to 13K this AM  given GNR on urine cx, de-escalate from zosyn to ceftriaxone 1g q24h. Duration will depend on CT A/P which is being obtained for suspected fecal matter in urine  - f/u CT A/P to rule out colovesicular fistula    #Congestive Heart Failure    -further management as per primary team     -TTE (12/21): EF 55-60%, moderate to severe LVH, moderate to severe TR and pHTN   -further diuresis as per primary team, currently on IV lasix 40mg q8hrs   -strict I/Os and fluid restriction     -consider palliative care consult pending clinical course       #Atrial Fibrillation with RVR    -further management as per primary team and EP   -continue with Eliquis 5mg BID    -currently on metoprolol tartrate 100mg BID  and started on amiodarone  -EP consulted, will consider cardioversion when euvolemic and few more days of amiodarone    #Hypothyroidism     -continue with levothyroxine 100mcg daily    -TSH and T4 within normal limits       #Elevated Bilirubin     -unclear etiology of minor elevation, no signs or symptoms of biliary pathology     -obtain indirect bilirubin and RUQ US       #Microcytic Anemia     -iron studies most consistent with CARRILLO   -IV iron x 3 days, then ferrous sulfate 325mg po daily    #Hyponatremia     -mild and asymptomatic, very likely hypervolemic in setting of ADHF      #Pre-Diabetes (A1c 6.4%)    -no history of DMII and is not on any medications    -outpatient PCP follow up      #HTN   -continue with valsartan 40mg daily       #HLD    -continue with atorvastatin 20mg daily       DVT PPx: Eliquis     Dispo: pending PT evaluation

## 2022-12-25 NOTE — PROGRESS NOTE ADULT - PROBLEM SELECTOR PLAN 4
- Iron studies most c/w CARRILLO   - Started IV iron per Medicine recs while inpatient, consider starting ferrous sulfate 325mg daily upon discharge --Iron studies most c/w CARRILLO   --Initially stated IV iron per medicine, but discontinued in setting of infection. Recommend PO iron on discharge. --Total bilirubin 1.6. Direct Billirubin .4   --Medicine team recommending, RUQ US performed ,     # Urinary retention  --Urinary retention s/p multiple straight caths; 12/24/22 O/N bladder scan w/ 1000cc.   --Douglas placed.

## 2022-12-25 NOTE — PROGRESS NOTE ADULT - PROBLEM SELECTOR PLAN 7
--Chol 125, TG 68, HDL 59, LDL 52.   --CONT: Atorvastatin 20mg PO QHS      DVT ppx: Eliquis  Dispo: pending euvolemia/rate control  PT Eval: pending  Full code --SBP soft overnight 80s-100s.   --HOLD anithypertensives at this time to give room for rate control and diuresis; reassess and reintroduce BP regimen as clinically appropriate.

## 2022-12-26 DIAGNOSIS — I38 ENDOCARDITIS, VALVE UNSPECIFIED: ICD-10-CM

## 2022-12-26 LAB
ALBUMIN SERPL ELPH-MCNC: 2.8 G/DL — LOW (ref 3.3–5)
ALP SERPL-CCNC: 66 U/L — SIGNIFICANT CHANGE UP (ref 40–120)
ALT FLD-CCNC: 21 U/L — SIGNIFICANT CHANGE UP (ref 10–45)
ANION GAP SERPL CALC-SCNC: 7 MMOL/L — SIGNIFICANT CHANGE UP (ref 5–17)
APPEARANCE UR: ABNORMAL
APTT BLD: 106.2 SEC — HIGH (ref 27.5–35.5)
APTT BLD: 128.7 SEC — CRITICAL HIGH (ref 27.5–35.5)
APTT BLD: 72.4 SEC — HIGH (ref 27.5–35.5)
APTT BLD: 94.8 SEC — HIGH (ref 27.5–35.5)
AST SERPL-CCNC: 24 U/L — SIGNIFICANT CHANGE UP (ref 10–40)
BACTERIA # UR AUTO: PRESENT /HPF
BILIRUB SERPL-MCNC: 1.2 MG/DL — SIGNIFICANT CHANGE UP (ref 0.2–1.2)
BILIRUB UR-MCNC: ABNORMAL
BUN SERPL-MCNC: 15 MG/DL — SIGNIFICANT CHANGE UP (ref 7–23)
CALCIUM SERPL-MCNC: 8.6 MG/DL — SIGNIFICANT CHANGE UP (ref 8.4–10.5)
CHLORIDE SERPL-SCNC: 94 MMOL/L — LOW (ref 96–108)
CO2 SERPL-SCNC: 32 MMOL/L — HIGH (ref 22–31)
COLOR SPEC: YELLOW — SIGNIFICANT CHANGE UP
COMMENT - URINE: SIGNIFICANT CHANGE UP
CREAT SERPL-MCNC: 0.5 MG/DL — SIGNIFICANT CHANGE UP (ref 0.5–1.3)
DIFF PNL FLD: ABNORMAL
EGFR: 86 ML/MIN/1.73M2 — SIGNIFICANT CHANGE UP
EPI CELLS # UR: SIGNIFICANT CHANGE UP /HPF (ref 0–5)
GLUCOSE SERPL-MCNC: 121 MG/DL — HIGH (ref 70–99)
GLUCOSE UR QL: 100
HCT VFR BLD CALC: 33.1 % — LOW (ref 34.5–45)
HCT VFR BLD CALC: 34.9 % — SIGNIFICANT CHANGE UP (ref 34.5–45)
HGB BLD-MCNC: 10.7 G/DL — LOW (ref 11.5–15.5)
HGB BLD-MCNC: 11 G/DL — LOW (ref 11.5–15.5)
KETONES UR-MCNC: NEGATIVE — SIGNIFICANT CHANGE UP
LEUKOCYTE ESTERASE UR-ACNC: ABNORMAL
MAGNESIUM SERPL-MCNC: 1.9 MG/DL — SIGNIFICANT CHANGE UP (ref 1.6–2.6)
MCHC RBC-ENTMCNC: 24.6 PG — LOW (ref 27–34)
MCHC RBC-ENTMCNC: 24.8 PG — LOW (ref 27–34)
MCHC RBC-ENTMCNC: 31.5 GM/DL — LOW (ref 32–36)
MCHC RBC-ENTMCNC: 32.3 GM/DL — SIGNIFICANT CHANGE UP (ref 32–36)
MCV RBC AUTO: 76.8 FL — LOW (ref 80–100)
MCV RBC AUTO: 77.9 FL — LOW (ref 80–100)
NITRITE UR-MCNC: NEGATIVE — SIGNIFICANT CHANGE UP
NRBC # BLD: 0 /100 WBCS — SIGNIFICANT CHANGE UP (ref 0–0)
NRBC # BLD: 0 /100 WBCS — SIGNIFICANT CHANGE UP (ref 0–0)
PH UR: 7 — SIGNIFICANT CHANGE UP (ref 5–8)
PLATELET # BLD AUTO: 195 K/UL — SIGNIFICANT CHANGE UP (ref 150–400)
PLATELET # BLD AUTO: 218 K/UL — SIGNIFICANT CHANGE UP (ref 150–400)
POTASSIUM SERPL-MCNC: 3.8 MMOL/L — SIGNIFICANT CHANGE UP (ref 3.5–5.3)
POTASSIUM SERPL-SCNC: 3.8 MMOL/L — SIGNIFICANT CHANGE UP (ref 3.5–5.3)
PROT SERPL-MCNC: 6.1 G/DL — SIGNIFICANT CHANGE UP (ref 6–8.3)
PROT UR-MCNC: 100 MG/DL
RAPID RVP RESULT: SIGNIFICANT CHANGE UP
RBC # BLD: 4.31 M/UL — SIGNIFICANT CHANGE UP (ref 3.8–5.2)
RBC # BLD: 4.48 M/UL — SIGNIFICANT CHANGE UP (ref 3.8–5.2)
RBC # FLD: 17.1 % — HIGH (ref 10.3–14.5)
RBC # FLD: 17.1 % — HIGH (ref 10.3–14.5)
RBC CASTS # UR COMP ASSIST: ABNORMAL /HPF
SARS-COV-2 RNA SPEC QL NAA+PROBE: SIGNIFICANT CHANGE UP
SODIUM SERPL-SCNC: 133 MMOL/L — LOW (ref 135–145)
SP GR SPEC: 1.01 — SIGNIFICANT CHANGE UP (ref 1–1.03)
UROBILINOGEN FLD QL: >=8 E.U./DL
WBC # BLD: 10.27 K/UL — SIGNIFICANT CHANGE UP (ref 3.8–10.5)
WBC # BLD: 10.52 K/UL — HIGH (ref 3.8–10.5)
WBC # FLD AUTO: 10.27 K/UL — SIGNIFICANT CHANGE UP (ref 3.8–10.5)
WBC # FLD AUTO: 10.52 K/UL — HIGH (ref 3.8–10.5)
WBC UR QL: ABNORMAL /HPF

## 2022-12-26 PROCEDURE — 99233 SBSQ HOSP IP/OBS HIGH 50: CPT | Mod: GC

## 2022-12-26 PROCEDURE — 99233 SBSQ HOSP IP/OBS HIGH 50: CPT

## 2022-12-26 RX ORDER — ALBUTEROL 90 UG/1
2.5 AEROSOL, METERED ORAL EVERY 6 HOURS
Refills: 0 | Status: DISCONTINUED | OUTPATIENT
Start: 2022-12-26 | End: 2023-01-03

## 2022-12-26 RX ORDER — MAGNESIUM OXIDE 400 MG ORAL TABLET 241.3 MG
800 TABLET ORAL ONCE
Refills: 0 | Status: COMPLETED | OUTPATIENT
Start: 2022-12-26 | End: 2022-12-26

## 2022-12-26 RX ORDER — IPRATROPIUM BROMIDE 0.2 MG/ML
500 SOLUTION, NON-ORAL INHALATION EVERY 6 HOURS
Refills: 0 | Status: DISCONTINUED | OUTPATIENT
Start: 2022-12-26 | End: 2023-01-03

## 2022-12-26 RX ORDER — IPRATROPIUM/ALBUTEROL SULFATE 18-103MCG
3 AEROSOL WITH ADAPTER (GRAM) INHALATION EVERY 6 HOURS
Refills: 0 | Status: DISCONTINUED | OUTPATIENT
Start: 2022-12-26 | End: 2022-12-26

## 2022-12-26 RX ORDER — POTASSIUM CHLORIDE 20 MEQ
40 PACKET (EA) ORAL ONCE
Refills: 0 | Status: COMPLETED | OUTPATIENT
Start: 2022-12-26 | End: 2022-12-26

## 2022-12-26 RX ADMIN — Medication 100 MICROGRAM(S): at 05:04

## 2022-12-26 RX ADMIN — Medication 40 MILLIGRAM(S): at 05:04

## 2022-12-26 RX ADMIN — Medication 100 MILLIGRAM(S): at 17:34

## 2022-12-26 RX ADMIN — HEPARIN SODIUM 0 UNIT(S)/HR: 5000 INJECTION INTRAVENOUS; SUBCUTANEOUS at 03:55

## 2022-12-26 RX ADMIN — HEPARIN SODIUM 1100 UNIT(S)/HR: 5000 INJECTION INTRAVENOUS; SUBCUTANEOUS at 05:00

## 2022-12-26 RX ADMIN — ALBUTEROL 2.5 MILLIGRAM(S): 90 AEROSOL, METERED ORAL at 15:46

## 2022-12-26 RX ADMIN — HEPARIN SODIUM 1100 UNIT(S)/HR: 5000 INJECTION INTRAVENOUS; SUBCUTANEOUS at 11:26

## 2022-12-26 RX ADMIN — Medication 500 MICROGRAM(S): at 15:46

## 2022-12-26 RX ADMIN — Medication 40 MILLIGRAM(S): at 14:39

## 2022-12-26 RX ADMIN — Medication 500 MICROGRAM(S): at 21:19

## 2022-12-26 RX ADMIN — HEPARIN SODIUM 900 UNIT(S)/HR: 5000 INJECTION INTRAVENOUS; SUBCUTANEOUS at 17:34

## 2022-12-26 RX ADMIN — CEFTRIAXONE 100 MILLIGRAM(S): 500 INJECTION, POWDER, FOR SOLUTION INTRAMUSCULAR; INTRAVENOUS at 11:04

## 2022-12-26 RX ADMIN — ALBUTEROL 2.5 MILLIGRAM(S): 90 AEROSOL, METERED ORAL at 21:18

## 2022-12-26 RX ADMIN — HEPARIN SODIUM 900 UNIT(S)/HR: 5000 INJECTION INTRAVENOUS; SUBCUTANEOUS at 23:31

## 2022-12-26 RX ADMIN — ATORVASTATIN CALCIUM 20 MILLIGRAM(S): 80 TABLET, FILM COATED ORAL at 21:18

## 2022-12-26 RX ADMIN — MAGNESIUM OXIDE 400 MG ORAL TABLET 800 MILLIGRAM(S): 241.3 TABLET ORAL at 11:01

## 2022-12-26 RX ADMIN — HEPARIN SODIUM 1100 UNIT(S)/HR: 5000 INJECTION INTRAVENOUS; SUBCUTANEOUS at 08:36

## 2022-12-26 RX ADMIN — Medication 100 MILLIGRAM(S): at 21:18

## 2022-12-26 RX ADMIN — HEPARIN SODIUM 900 UNIT(S)/HR: 5000 INJECTION INTRAVENOUS; SUBCUTANEOUS at 20:23

## 2022-12-26 RX ADMIN — Medication 40 MILLIEQUIVALENT(S): at 11:00

## 2022-12-26 RX ADMIN — AMIODARONE HYDROCHLORIDE 200 MILLIGRAM(S): 400 TABLET ORAL at 05:04

## 2022-12-26 NOTE — PROGRESS NOTE ADULT - ASSESSMENT
89-year-old female with a PMHx of HTN, HLD, hypothyroidism, TIA, history of AVR, Afib (s/p remote ablation, on Eliquis) and CHF who presented with acute of chronic decompensated heart failure and Afib with RVR.        #UTI  monitor fever/wbc curve.  leukocytosis has improved, labs reviewed.  -Ucx +E.coli and +Proteus.  - continue ceftriaxone 1g q24h x 3 days, then switch to po vantin to complete total 7 day course.  - defer CT A/P at this time, low suspicion of colovesicular fistula given no fecal matter in soto catheter  - TOV tomorrow.    #Congestive Heart Failure    -further management as per primary team     -TTE (12/21): EF 55-60%, moderate to severe LVH, moderate to severe TR and pHTN   -further diuresis as per primary team, currently on IV lasix 40mg q8hrs   -strict I/Os and fluid restriction     -consider palliative care consult pending clinical course       #Atrial Fibrillation with RVR    -further management as per primary team and EP   -continue with Eliquis 5mg BID    -currently on metoprolol tartrate 100mg BID and amiodarone  -EP consulted, will consider cardioversion early this week.    #Hypothyroidism     -continue with levothyroxine 100mcg daily    -TSH and T4 within normal limits       #Elevated Bilirubin     - remains stable    #Microcytic Anemia     -iron studies most consistent with CARRILLO   -c/w ferrous sulfate 325mg po daily    #Hyponatremia     -mild and asymptomatic, very likely hypervolemic in setting of ADHF      #Pre-Diabetes (A1c 6.4%)    -no history of DMII and is not on any medications    -outpatient PCP follow up      #HTN   -continue with valsartan 40mg daily       #HLD    -continue with atorvastatin 20mg daily       DVT PPx: Eliquis   discussed w catracho by bedside.  Dispo: pending PT evaluation

## 2022-12-26 NOTE — PROGRESS NOTE ADULT - SUBJECTIVE AND OBJECTIVE BOX
General Surgery Progress Note    SUBJECTIVE:   Patient seen and examined at bedside by chief during AM rounds.    Vital Signs Last 24 Hrs  T(C): 36.4 (26 Dec 2022 09:55), Max: 36.9 (25 Dec 2022 22:25)  T(F): 97.5 (26 Dec 2022 09:55), Max: 98.4 (25 Dec 2022 22:25)  HR: 122 (26 Dec 2022 14:37) (92 - 122)  BP: 118/62 (26 Dec 2022 14:37) (95/53 - 124/70)  BP(mean): 72 (26 Dec 2022 05:02) (72 - 85)  RR: 17 (26 Dec 2022 14:37) (17 - 19)  SpO2: 95% (26 Dec 2022 14:37) (93% - 97%)    Parameters below as of 26 Dec 2022 14:37  Patient On (Oxygen Delivery Method): nasal cannula  O2 Flow (L/min): 2      I&O's Summary    25 Dec 2022 07:01  -  26 Dec 2022 07:00  --------------------------------------------------------  IN: 180 mL / OUT: 2250 mL / NET: -2070 mL    26 Dec 2022 07:01  -  26 Dec 2022 15:23  --------------------------------------------------------  IN: 448 mL / OUT: 600 mL / NET: -152 mL        Physical Exam:  General: Resting comfortably in bed, NAD  HEENT: ATNC  Pulmonary: Nonlabored breathing, no respiratory distress, no acessory muscle use noted  Cardiovascular: NSR  Abdomen: Soft, nondisteded, appropriate incisional tenderness  Extremities: WWP, SCDs in place, No significant edema appreciated    LABS:                        11.0   10.52 )-----------( 218      ( 26 Dec 2022 07:30 )             34.9     12-    133<L>  |  94<L>  |  15  ----------------------------<  121<H>  3.8   |  32<H>  |  0.50    Ca    8.6      26 Dec 2022 07:30  Mg     1.9         TPro  6.1  /  Alb  2.8<L>  /  TBili  1.2  /  DBili  x   /  AST  24  /  ALT  21  /  AlkPhos  66      PTT - ( 26 Dec 2022 10:32 )  PTT:94.8 sec  Urinalysis Basic - ( 26 Dec 2022 14:08 )    Color: Yellow / Appearance: Turbid / S.010 / pH: x  Gluc: x / Ketone: NEGATIVE  / Bili: Moderate / Urobili: >=8.0 E.U./dL   Blood: x / Protein: 100 mg/dL / Nitrite: NEGATIVE   Leuk Esterase: Moderate / RBC: Many /HPF / WBC Many /HPF   Sq Epi: x / Non Sq Epi: 0-5 /HPF / Bacteria: Present /HPF        Plan:  -NPO/IVF - LR @ 100  -Antibiotics -  -SQH and SCDs  -OOB as tolerated/IS  -AM labs General Surgery Progress Note    SUBJECTIVE:   Patient seen and examined at bedside with attending. No acute events noted overnight. Patient denies abdominal pain at this time.     Vital Signs Last 24 Hrs  T(C): 36.4 (26 Dec 2022 09:55), Max: 36.9 (25 Dec 2022 22:25)  T(F): 97.5 (26 Dec 2022 09:55), Max: 98.4 (25 Dec 2022 22:25)  HR: 122 (26 Dec 2022 14:37) (92 - 122)  BP: 118/62 (26 Dec 2022 14:37) (95/53 - 124/70)  BP(mean): 72 (26 Dec 2022 05:02) (72 - 85)  RR: 17 (26 Dec 2022 14:37) (17 - 19)  SpO2: 95% (26 Dec 2022 14:37) (93% - 97%)    Parameters below as of 26 Dec 2022 14:37  Patient On (Oxygen Delivery Method): nasal cannula  O2 Flow (L/min): 2      I&O's Summary    25 Dec 2022 07:01  -  26 Dec 2022 07:00  --------------------------------------------------------  IN: 180 mL / OUT: 2250 mL / NET: -2070 mL    26 Dec 2022 07:01  -  26 Dec 2022 15:23  --------------------------------------------------------  IN: 448 mL / OUT: 600 mL / NET: -152 mL        Physical Exam:  General: Resting comfortably in bed, NAD  Pulmonary: Equal chest wall expansion b/l, no respiratory distress  Cardiovascular: NSR  Abdominal: soft, mildly distended, tender to palpation in lower abdomen no rebound or guarding  : soto catheter in place with turbid, orange urine output  Extremities: WWP, No significant edema appreciated     LABS:                        11.0   10.52 )-----------( 218      ( 26 Dec 2022 07:30 )             34.9         133<L>  |  94<L>  |  15  ----------------------------<  121<H>  3.8   |  32<H>  |  0.50    Ca    8.6      26 Dec 2022 07:30  Mg     1.9         TPro  6.1  /  Alb  2.8<L>  /  TBili  1.2  /  DBili  x   /  AST  24  /  ALT  21  /  AlkPhos  66      PTT - ( 26 Dec 2022 10:32 )  PTT:94.8 sec  Urinalysis Basic - ( 26 Dec 2022 14:08 )    Color: Yellow / Appearance: Turbid / S.010 / pH: x  Gluc: x / Ketone: NEGATIVE  / Bili: Moderate / Urobili: >=8.0 E.U./dL   Blood: x / Protein: 100 mg/dL / Nitrite: NEGATIVE   Leuk Esterase: Moderate / RBC: Many /HPF / WBC Many /HPF   Sq Epi: x / Non Sq Epi: 0-5 /HPF / Bacteria: Present /HPF

## 2022-12-26 NOTE — DISCHARGE NOTE PROVIDER - PROVIDER TOKENS
PROVIDER:[TOKEN:[[id number]],FOLLOWUP:[1 week]],PROVIDER:[TOKEN:[[id number]],FOLLOWUP:[2 months]] PROVIDER:[TOKEN:[[id number]],FOLLOWUP:[1 week]],PROVIDER:[TOKEN:[[id number]]] PROVIDER:[TOKEN:[[id number]],FOLLOWUP:[1 week]],PROVIDER:[TOKEN:[[id number]]],FREE:[LAST:[Urology],FIRST:[[facility]],PHONE:[phone]],FAX:[(   )    -],ADDRESS:[[address] [place] [address]]] PROVIDER:[TOKEN:[[id number]],SCHEDULEDAPPT:[01/04/2023],SCHEDULEDAPPTTIME:[02:00 PM],ESTABLISHEDPATIENT:[T]],PROVIDER:[TOKEN:[[id number]]],FREE:[LAST:[Urology],FIRST:[[facility]],PHONE:[phone]],FAX:[(   )    -],ADDRESS:[[address] [place] [address]]]

## 2022-12-26 NOTE — PROGRESS NOTE ADULT - ASSESSMENT
89F w/ PMHx HTN, HLD, AF on Eliquis (remote h/o ablation 10+yrs ago), HFpEF, s/p AVR, Hypothyroidism and TIA, admitted to cardiac tele for further management of acute decompensated CHF and AF w/ RVR. EP following, pending DCCV 12/27/22 vs AVN ablation w/ PPM placement. Hospital course c/b lethargy and fever on 12/24, pt found to have a UTI as well as fecal material seen in urine during soto placement. Pt currently on IV abx x 7 days and general surgery following for possible colovesical fistula. 89F w/ PMHx HTN, HLD, AF on Eliquis (remote h/o ablation >10yrs ago), HFpEF, s/p bioprosthetic AVR, Hypothyroidism and TIA, admitted to cardiac tele for further management of acute decompensated CHF and AF w/ RVR. EP following, pending DCCV vs AVN ablation w/ PPM placement on 12/27. Hospital course c/b lethargy and fever on 12/24, pt found to have a UTI as well as fecal material seen in urine during soto placement. Pt currently on IV abx x 7 days and general surgery following for possible colovesical fistula. 89F w/ PMHx HTN, HLD, AF on Eliquis (remote h/o ablation >10yrs ago), HFpEF, s/p bioprosthetic AVR, Hypothyroidism and TIA, admitted to cardiac tele for further management of acute decompensated CHF and AF w/ RVR. EP following, pending DCCV 12/27 w/ possible AVN w/ PPM placement during this hospitalization. Hospital course c/b lethargy and fever on 12/24, pt found to have a UTI as well as fecal material seen in urine during soto placement. Pt currently on IV abx x 7 days and general surgery following for possible colovesical fistula. 89F w/ PMHx HTN, HLD, AF on Eliquis (remote h/o ablation >10yrs ago), HFpEF, s/p bioprosthetic AVR, Hypothyroidism and TIA, admitted to cardiac tele for further management of acute decompensated CHF and AF w/ RVR. EP following, pending DCCV 12/27 w/ possible AVN ablation w/ PPM placement during this hospitalization. Hospital course c/b lethargy and fever on 12/24, pt found to have a UTI as well as fecal material seen in urine during soto placement. Pt currently on IV abx x 7 days and general surgery following for possible colovesical fistula.

## 2022-12-26 NOTE — PROGRESS NOTE ADULT - PROBLEM SELECTOR PLAN 7
labile SBP on 12/25 (80-100s), currently stable  -Continue Lasix 40mg IV BID and transition to PO in AM  -Holding home Valsartan and Atenolol 2/2 labile BP, restart in AM if stable H/H remaining stable ~ 10-11s, no active signs of bleeding  -Iron studies c/w CARRILLO  -Pt initiated on IV iron, however now held i/s/o active infection  -Start PO Iron supplementation on d/c

## 2022-12-26 NOTE — PROGRESS NOTE ADULT - PROBLEM SELECTOR PLAN 2
presented in AF w/ RVR, currently rate controlled ~90s  -EP consulted, NPO after MN for DCCV vs AVN ablation w/ PPM placement 12/27  -Initiated on Amio 200mg TID however on 12/24 HR uncontrolled and now s/p Digoxin 500mcg IV x 1 and s/p Amio gtt x 24hrs  -Continue Amio 200mg QD  -Holding Eliquis i/s/o possible surgical intervention, c/w Heparin gtt for now  -Holding Atenolol 25mg 2/2 labile SBP presented in AF w/ RVR, currently rate controlled ~90s  -EP consulted, NPO after MN for DCCV 12/27. If unsuccessful will consider AVN ablation w/ PPM placement this hospitalization  -Initiated on Amio 200mg TID however on 12/24 HR uncontrolled and now s/p Digoxin 500mcg IV x 1 and s/p Amio gtt x 24hrs  -Continue Amio 200mg QD  -Holding Eliquis i/s/o possible surgical intervention, c/w Heparin gtt for now  -Holding Metoprolol 2/2 labile SBP

## 2022-12-26 NOTE — PROVIDER CONTACT NOTE (CRITICAL VALUE NOTIFICATION) - BACKGROUND
Pt admitted for CHF exacerbation and Afib w/ RVR, pending DCCV vs ablation, started on heparin drip 12/25/22 night shift

## 2022-12-26 NOTE — PROGRESS NOTE ADULT - PROBLEM SELECTOR PLAN 3
On 12/24 evening pt became lethargic w/ Tmax 100.9; currently non toxic appearing and HD stable  -Leukocytosis downtrending and has remained afebrile  -Blood Cx NGTD  -UA w/ mod LE, WBC >10 and bacteria  -UCx w/ E. Coli, pending susceptibility   -Continue Ceftriaxone 1g IV QD x 7 days (last dose 12/31      ## R/O Colovesical Fistula  --12/24/22 PM / 12/25/22 AM -- concern for fecal matter in soto collection.   --Surgery consulted and patient s/p CT Abd/Pelv w/ IV/PO contrast; surgery recommending no acute intervention at this time based on wet read of CT -- **F/U full/final report of CT scan.  --Per discussion w/ surgery team 12/25/22, no acute intervention indicated at this time. On 12/24 evening pt became lethargic w/ Tmax 100.9; currently non toxic appearing and HD stable  -Leukocytosis downtrending and has remained afebrile  -Blood Cx NGTD  -UA w/ mod LE, WBC >10 and bacteria  -UCx w/ E. Coli, pending susceptibility   -Continue Ceftriaxone 1g IV QD x 7 days (last dose 12/31)  -s/p soto placement for urinary retention      #R/O Colovesical Fistula  -During straight cath and soto placement, fecal matter noted  -Gen Surgery Team 4 consulted, no acute surgical intervention at this time  -f/u official CT Abd/Pelvis results On 12/24 evening pt became lethargic w/ Tmax 100.9; currently non toxic appearing and HD stable  -Leukocytosis downtrending and has remained afebrile  -Blood Cx NGTD  -UA w/ mod LE, WBC >10 and bacteria  -UCx w/ E. Coli, pending susceptibility   -Continue Ceftriaxone 1g IV QD x 7 days (last dose 12/31)  -s/p soto placement for urinary retention      #R/O Colovesical Fistula  -During straight cath and soto placement fecal matter noted however may be 2/2 prior primafit in place and pt w/ soft stools per daughter  -Gen Surgery Team 4 consulted, no acute surgical intervention at this time  -f/u official CT Abd/Pelvis results On 12/24 evening pt became lethargic w/ Tmax 100.9; currently non toxic appearing and HD stable  -Leukocytosis downtrending and has remained afebrile  -Blood Cx NGTD  -UA w/ mod LE, WBC >10 and bacteria  -UCx w/ E. Coli, pending susceptibility   -Continue Ceftriaxone 1g IV QD x 7 days (last dose 12/31)  -s/p soto placement for urinary retention      #R/O Colovesical Fistula  -During straight cath and soto placement fecal matter noted however may be 2/2 prior primafit in place and pt w/ diarrhea, per daughter  -Gen Surgery Team 4 consulted, no acute surgical intervention at this time  -f/u CT Abd/Pelvis results

## 2022-12-26 NOTE — PROGRESS NOTE ADULT - SUBJECTIVE AND OBJECTIVE BOX
Cardiology PA Adult Progress Note    SUBJECTIVE ASSESSMENT:  	  MEDICATIONS:  aMIOdarone    Tablet 200 milliGRAM(s) Oral daily  aMIOdarone Infusion 1 mG/Min IV Continuous <Continuous>  aMIOdarone Infusion 0.5 mG/Min IV Continuous <Continuous>  furosemide   Injectable 40 milliGRAM(s) IV Push two times a day    cefTRIAXone   IVPB 1000 milliGRAM(s) IV Intermittent every 24 hours  ALPRAZolam 0.25 milliGRAM(s) Oral daily PRN  atorvastatin 20 milliGRAM(s) Oral at bedtime  levothyroxine 100 MICROGram(s) Oral daily  heparin  Infusion.  Unit(s)/Hr IV Continuous <Continuous>  influenza  Vaccine (HIGH DOSE) 0.7 milliLiter(s) IntraMuscular once  sodium chloride 0.65% Nasal 1 Spray(s) Both Nostrils daily PRN  	  VITAL SIGNS:  T(C): 36.4 (12-26-22 @ 06:33), Max: 36.9 (12-25-22 @ 22:25)  HR: 92 (12-26-22 @ 05:02) (92 - 104)  BP: 102/58 (12-26-22 @ 05:02) (96/50 - 124/70)  RR: 18 (12-26-22 @ 05:02) (17 - 18)  SpO2: 94% (12-26-22 @ 05:02) (94% - 97%)    I&O's Summary  25 Dec 2022 07:01  -  26 Dec 2022 07:00  --------------------------------------------------------  IN: 180 mL / OUT: 2250 mL / NET: -2070 mL                                     PHYSICAL EXAM:  Appearance: Normal	  HEENT: Normal oral mucosa, PERRL, EOMI	  Neck: Supple, + JVD/ - JVD; ___ Carotid Bruit   Cardiovascular: Normal S1 S2, No murmurs  Respiratory: Lungs clear to auscultation/Decreased Breath Sounds/No Rales, Rhonchi, Wheezing	  Gastrointestinal:  Soft, Non-tender, + BS	  Skin: No rashes, No ecchymoses, No cyanosis  Extremities: Normal range of motion, No clubbing, cyanosis or edema  Vascular: Peripheral pulses palpable 2+ bilaterally  Neurologic: Non-focal  Psychiatry: A & O x 3, Mood & affect appropriate    LABS:	 	                      10.7   10.27 )-----------( 195      ( 26 Dec 2022 03:17 )             33.1     12-25    131<L>  |  92<L>  |  20  ----------------------------<  120<H>  4.2   |  30  |  0.67    Ca    8.6      25 Dec 2022 07:19  Mg     2.0     12-25    TPro  5.9<L>  /  Alb  2.8<L>  /  TBili  1.6<H>  /  DBili  x   /  AST  35  /  ALT  22  /  AlkPhos  78  12-25    PTT - ( 26 Dec 2022 03:17 )  PTT:128.7 sec   Cardiology PA Adult Progress Note    SUBJECTIVE ASSESSMENT: Pt seen and examined at bedside this AM sitting up comfortably in bed w/o any complaints or events overnight. She states that she feels well and denies any SOB at rest, orthopnea, PND, CP, palpitations, dizziness/lightheadedness, fatigue, N/V, abd pain, or HA.  	  MEDICATIONS:  aMIOdarone    Tablet 200 milliGRAM(s) Oral daily  aMIOdarone Infusion 1 mG/Min IV Continuous <Continuous>  aMIOdarone Infusion 0.5 mG/Min IV Continuous <Continuous>  furosemide   Injectable 40 milliGRAM(s) IV Push two times a day    cefTRIAXone   IVPB 1000 milliGRAM(s) IV Intermittent every 24 hours  ALPRAZolam 0.25 milliGRAM(s) Oral daily PRN  atorvastatin 20 milliGRAM(s) Oral at bedtime  levothyroxine 100 MICROGram(s) Oral daily  heparin  Infusion.  Unit(s)/Hr IV Continuous <Continuous>  influenza  Vaccine (HIGH DOSE) 0.7 milliLiter(s) IntraMuscular once  sodium chloride 0.65% Nasal 1 Spray(s) Both Nostrils daily PRN  	  VITAL SIGNS:  T(C): 36.4 (12-26-22 @ 06:33), Max: 36.9 (12-25-22 @ 22:25)  HR: 92 (12-26-22 @ 05:02) (92 - 104)  BP: 102/58 (12-26-22 @ 05:02) (96/50 - 124/70)  RR: 18 (12-26-22 @ 05:02) (17 - 18)  SpO2: 94% (12-26-22 @ 05:02) (94% - 97%)    I&O's Summary  25 Dec 2022 07:01  -  26 Dec 2022 07:00  --------------------------------------------------------  IN: 180 mL / OUT: 2250 mL / NET: -2070 mL                                     PHYSICAL EXAM:  Appearance: Normal	  HEENT: Normal oral mucosa, PERRL, EOMI	  Neck: Supple, + JVD/ - JVD; ___ Carotid Bruit   Cardiovascular: Normal S1 S2, No murmurs  Respiratory: Lungs clear to auscultation/Decreased Breath Sounds/No Rales, Rhonchi, Wheezing	  Gastrointestinal:  Soft, Non-tender, + BS	  Skin: No rashes, No ecchymoses, No cyanosis  Extremities: Normal range of motion, No clubbing, cyanosis or edema  Vascular: Peripheral pulses palpable 2+ bilaterally  Neurologic: Non-focal  Psychiatry: A & O x 3, Mood & affect appropriate    LABS:	 	                      10.7   10.27 )-----------( 195      ( 26 Dec 2022 03:17 )             33.1     12-25    131<L>  |  92<L>  |  20  ----------------------------<  120<H>  4.2   |  30  |  0.67    Ca    8.6      25 Dec 2022 07:19  Mg     2.0     12-25    TPro  5.9<L>  /  Alb  2.8<L>  /  TBili  1.6<H>  /  DBili  x   /  AST  35  /  ALT  22  /  AlkPhos  78  12-25    PTT - ( 26 Dec 2022 03:17 )  PTT:128.7 sec   Cardiology PA Adult Progress Note    SUBJECTIVE ASSESSMENT: Pt seen and examined at bedside this AM sitting up comfortably in bed w/o any complaints or events overnight. She states that she feels well and denies any SOB at rest, orthopnea, PND, CP, palpitations, dizziness/lightheadedness, fatigue, N/V, abd pain, or HA.  	  MEDICATIONS:  aMIOdarone    Tablet 200 milliGRAM(s) Oral daily  aMIOdarone Infusion 1 mG/Min IV Continuous <Continuous>  aMIOdarone Infusion 0.5 mG/Min IV Continuous <Continuous>  furosemide   Injectable 40 milliGRAM(s) IV Push two times a day    cefTRIAXone   IVPB 1000 milliGRAM(s) IV Intermittent every 24 hours  ALPRAZolam 0.25 milliGRAM(s) Oral daily PRN  atorvastatin 20 milliGRAM(s) Oral at bedtime  levothyroxine 100 MICROGram(s) Oral daily  heparin  Infusion.  Unit(s)/Hr IV Continuous <Continuous>  influenza  Vaccine (HIGH DOSE) 0.7 milliLiter(s) IntraMuscular once  sodium chloride 0.65% Nasal 1 Spray(s) Both Nostrils daily PRN  	  VITAL SIGNS:  T(C): 36.4 (12-26-22 @ 06:33), Max: 36.9 (12-25-22 @ 22:25)  HR: 92 (12-26-22 @ 05:02) (92 - 104)  BP: 102/58 (12-26-22 @ 05:02) (96/50 - 124/70)  RR: 18 (12-26-22 @ 05:02) (17 - 18)  SpO2: 94% (12-26-22 @ 05:02) (94% - 97%)    I&O's Summary  25 Dec 2022 07:01  -  26 Dec 2022 07:00  --------------------------------------------------------  IN: 180 mL / OUT: 2250 mL / NET: -2070 mL                                     PHYSICAL EXAM:  Appearance: Normal	  HEENT: Normal oral mucosa, PERRL, EOMI	  Neck: Supple,  - JVD; no Carotid Bruit   Cardiovascular: Normal S1 S2, No murmurs  Respiratory: Decreased Breath Sounds, No Rales, Rhonchi, Wheezing	  Gastrointestinal:  Soft, Non-tender, + BS	  Skin: No rashes, No ecchymoses, No cyanosis  Extremities: Normal range of motion, No clubbing, cyanosis or edema  Vascular: Peripheral pulses palpable 2+ bilaterally  Neurologic: Non-focal  Psychiatry: A & O x 3, Mood & affect appropriate    LABS:	 	                      10.7   10.27 )-----------( 195      ( 26 Dec 2022 03:17 )             33.1     12-25    131<L>  |  92<L>  |  20  ----------------------------<  120<H>  4.2   |  30  |  0.67    Ca    8.6      25 Dec 2022 07:19  Mg     2.0     12-25    TPro  5.9<L>  /  Alb  2.8<L>  /  TBili  1.6<H>  /  DBili  x   /  AST  35  /  ALT  22  /  AlkPhos  78  12-25    PTT - ( 26 Dec 2022 03:17 )  PTT:128.7 sec   Cardiology PA Adult Progress Note    SUBJECTIVE ASSESSMENT: Pt seen and examined at bedside this AM sitting up comfortably in bed w/o any complaints or events overnight. She states that she feels well and denies any SOB at rest, orthopnea, PND, CP, palpitations, dizziness/lightheadedness, fatigue, N/V, abd pain, or HA.  	  MEDICATIONS:  aMIOdarone    Tablet 200 milliGRAM(s) Oral daily  furosemide   Injectable 40 milliGRAM(s) IV Push two times a day    cefTRIAXone   IVPB 1000 milliGRAM(s) IV Intermittent every 24 hours  ALPRAZolam 0.25 milliGRAM(s) Oral daily PRN  atorvastatin 20 milliGRAM(s) Oral at bedtime  levothyroxine 100 MICROGram(s) Oral daily  heparin  Infusion.  Unit(s)/Hr IV Continuous <Continuous>  influenza  Vaccine (HIGH DOSE) 0.7 milliLiter(s) IntraMuscular once  sodium chloride 0.65% Nasal 1 Spray(s) Both Nostrils daily PRN  	  VITAL SIGNS:  T(C): 36.4 (12-26-22 @ 06:33), Max: 36.9 (12-25-22 @ 22:25)  HR: 92 (12-26-22 @ 05:02) (92 - 104)  BP: 102/58 (12-26-22 @ 05:02) (96/50 - 124/70)  RR: 18 (12-26-22 @ 05:02) (17 - 18)  SpO2: 94% (12-26-22 @ 05:02) (94% - 97%)    I&O's Summary  25 Dec 2022 07:01  -  26 Dec 2022 07:00  --------------------------------------------------------  IN: 180 mL / OUT: 2250 mL / NET: -2070 mL                                     PHYSICAL EXAM:  Appearance: Normal	  HEENT: Normal oral mucosa, PERRL, EOMI	  Neck: Supple,  - JVD; no Carotid Bruit   Cardiovascular: Normal S1 S2, No murmurs  Respiratory: Decreased Breath Sounds, No Rales, Rhonchi, Wheezing	  Gastrointestinal:  Soft, Non-tender, + BS	  Skin: No rashes, No ecchymoses, No cyanosis  Extremities: Normal range of motion, No clubbing, cyanosis or edema  Vascular: Peripheral pulses palpable 2+ bilaterally  Neurologic: Non-focal  Psychiatry: A & O x 3, Mood & affect appropriate    LABS:	 	                      10.7   10.27 )-----------( 195      ( 26 Dec 2022 03:17 )             33.1     12-25    131<L>  |  92<L>  |  20  ----------------------------<  120<H>  4.2   |  30  |  0.67    Ca    8.6      25 Dec 2022 07:19  Mg     2.0     12-25    TPro  5.9<L>  /  Alb  2.8<L>  /  TBili  1.6<H>  /  DBili  x   /  AST  35  /  ALT  22  /  AlkPhos  78  12-25    PTT - ( 26 Dec 2022 03:17 )  PTT:128.7 sec

## 2022-12-26 NOTE — PROGRESS NOTE ADULT - NSPROGADDITIONALINFOA_GEN_ALL_CORE
Attending Attestation:  I was physically present for the key portions of the evaluation and management (E/M) service provided.  I agree with the above history, physical, and plan which I have reviewed.  - low threshold to panculture and start broad spectrum antibx if spikes a fever or shows sx of infection  Marco Zepeda MD (Cardiology)  35 minutes spent on total encounter; more than 50% of the visit was spent counseling and/or coordinating care by the attending physician.
Attending Attestation:  I was physically present for the key portions of the evaluation and management (E/M) service provided.  I agree with the above history, physical, and plan which I have reviewed with the following edits/addendum:    89F w HTN HLP hypothy, TIA, s/p AVR, Afib on apixaban (s/p PVI remotely), HFpEF who was admitted for #ADHF and #AF-RVR.   - HRs improved w diuresis, antibx tx for possible #UTI, amiodarone  - EP planning for AVIVA-CVN, possible AVN ablation+leadless PPM if w recurrence  - now on hep gtt. rate control w amio + BB  - apprec surgery recs. W/u done to rule out -GI fistula, pending CT read  - apprec IM recs on antibx mgt    Marco Zepeda MD  Cardiology    35 minutes spent on total encounter; more than 50% of the visit was spent counseling and/or coordinating care by the attending physician.
Attending Attestation:  I was physically present for the key portions of the evaluation and management (E/M) service provided.  I agree with the above history, physical, and plan which I have reviewed with the following edits/addendum:    89F w HTN HLP hypothy, TIA, s/p AVR, Afib on apixaban (s/p PVI remotely), HFpEF who was admitted for #ADHF and #AF-RVR c/b transient hypotension possibly sec to #URI vs UTI  - swab for possible viral infxn, send stools for analysis (stools pasty, not watery per nursing staff)  - HRs improved w diuresis, amiodarone and/or from resolution of infxn with antibx tx   - EP planning for AVIVA-CVN, possible AVN ablation+leadless PPM if w recurrence tmw/early this week. npo at [place]  - now on hep gtt in case of surgical intervention  - apprec surgery recs. W/u done to rule out -GI fistula, pending CT read. Repeat UA (drawn from side port of FC placed yesterday: presumed clean insertion) to clarify if initial UA was just from dirty sample  - apprec IM recs on antibx mgt    Marco Zepeda MD  Cardiology    35 minutes spent on total encounter; more than 50% of the visit was spent counseling and/or coordinating care by the attending physician.

## 2022-12-26 NOTE — DISCHARGE NOTE PROVIDER - NSDCMRMEDTOKEN_GEN_ALL_CORE_FT
atenolol 25 mg oral tablet: 1 tab(s) orally once a day  atorvastatin 20 mg oral tablet: 1 tab(s) orally once a day  Eliquis 5 mg oral tablet: 1 tab(s) orally 2 times a day  furosemide 20 mg oral tablet: 1 tab(s) orally every 12 hours  Klor-Con 10 oral tablet, extended release: 1 tab(s) orally once a day  levothyroxine 100 mcg (0.1 mg) oral tablet: 1 tab(s) orally once a day  meclizine 12.5 mg oral tablet: 1 tab(s) orally 3 times a day, As Needed  valsartan 40 mg oral tablet: 1 tab(s) orally once a day  Xanax 0.25 mg oral tablet: 1 tab(s) orally every 6 hours, As Needed   albuterol 5 mg/mL (0.5%) inhalation solution: 0.5 milliliter(s) inhaled every 6 hours, As needed, Shortness of Breath and/or Wheezing  amiodarone 200 mg oral tablet: 1 tab(s) orally once a day  apixaban 5 mg oral tablet: 1 tab(s) orally 2 times a day  atorvastatin 20 mg oral tablet: 1 tab(s) orally once a day (at bedtime)  benzonatate 100 mg oral capsule: 1 cap(s) orally 3 times a day  furosemide 40 mg oral tablet: 1 tab(s) orally every other day  guaiFENesin 600 mg oral tablet, extended release: 1 tab(s) orally every 12 hours, As needed, Cough  ipratropium 500 mcg/2.5 mL inhalation solution: 2.5 milliliter(s) inhaled every 6 hours  Klor-Con 10 oral tablet, extended release: 1 tab(s) orally once a day  levothyroxine 100 mcg (0.1 mg) oral tablet: 1 tab(s) orally once a day  meclizine 12.5 mg oral tablet: 1 tab(s) orally 3 times a day, As Needed  melatonin 5 mg oral tablet: 1 tab(s) orally once a day (at bedtime)  metoprolol succinate 25 mg oral tablet, extended release: 1 tab(s) orally once a day  senna leaf extract oral tablet: 2 tab(s) orally once a day (at bedtime)  sodium chloride 0.65% nasal spray: 1 spray(s) nasal once a day, As Needed  spironolactone 25 mg oral tablet: 1 tab(s) orally every 12 hours  zaleplon 5 mg oral capsule: 1 cap(s) orally once a day (at bedtime), As needed, Insomnia

## 2022-12-26 NOTE — PROGRESS NOTE ADULT - SUBJECTIVE AND OBJECTIVE BOX
Subjective:    fever curve downtrended  remains on o2  still with tachycardia    Objective:     Physical Exam:  -Gen: NAD, resting in bed, pleasant  -HEENT: EOMI, PERRL, no JVD, no bruits  -CV: irregular and tachycardic, no murmurs appreciated   -Lungs: bibasilar crackles, normal respiratory effort on NC  -Ab: mild distension, soft, NT, normal BS  -Ext: significant bilateral pitting edema in the thighs, ACE wraps uncovered, improved peripheral edema of the lower legs  -Neuro: A&O x 3, no focal deficits

## 2022-12-26 NOTE — PROGRESS NOTE ADULT - PROBLEM SELECTOR PLAN 9
TFTs wnl  -Continue Levothyroxine 100mcg QD    F: None  E: Replete if K<4 or Mag<2  N: DASH Diet, 1L fluid restriction  VTEppx: Heparin gtt  Dispo: Cardiac tele  PT: Home PT and 24hr HHA

## 2022-12-26 NOTE — PROGRESS NOTE ADULT - PROBLEM SELECTOR PLAN 4
--Total bilirubin 1.6. Direct Billirubin .4   --Medicine team recommending, RUQ US performed ,     # Urinary retention  --Urinary retention s/p multiple straight caths; 12/24/22 O/N bladder scan w/ 1000cc.   --Douglas placed. h/o bioprosthetic AVR  -TTE 12/21 revealing functioning bioprosthetic AV w/ mild AR, mod MR and mod-severe TR  -Continue w/ above diuresis labile SBP on 12/25 (80-100s), currently stable  -Continue Lasix 40mg IV BID and transition to PO in AM  -Holding home Valsartan and Atenolol 2/2 labile BP, restart as tolerated

## 2022-12-26 NOTE — DISCHARGE NOTE PROVIDER - CARE PROVIDER_API CALL
SHYLA MONSON  Internal Medicine  [address] [address]  Phone: ([phone]  Fax: ()-  Follow Up Time: 1 week    Raul La)  Cardiovascular Disease; Internal Medicine  [address] [place] [address]  Phone: [phone]  Fax: [phone]  Follow Up Time: 2 months   SHYLA MONSON  Internal Medicine  [address] [address]  Phone: ()-  Fax: ()-  Follow Up Time: 1 week    Consuelo La)  Cardiac Electrophysiology; Cardiovascular Disease; Internal Medicine  [address] [address]  Phone: [phone]  Fax: [phone]  Follow Up Time:    HSYLA MONSON  Internal Medicine  [address] [address]  Phone: ()-  Fax: ()-  Follow Up Time: 1 week    Consuelo La)  Cardiac Electrophysiology; Cardiovascular Disease; Internal Medicine  [address] [place] [address]  Phone: [phone]  Fax: [phone]  Follow Up Time:     Urology, [facility]  [address], [place] [address]  Phone: [phone]  Fax: (   )    -  Follow Up Time:    SHYLA MONSON  Internal Medicine  [address] [address]  Phone: ()-  Fax: ()-  Established Patient  Scheduled Appointment: 01/04/2023 02:00 PM    Consuelo La)  Cardiac Electrophysiology; Cardiovascular Disease; Internal Medicine  [address] [address]  Phone: [phone]  Fax: [phone]  Follow Up Time:     Urology, [facility]  [address] [place] [address]  Phone: [phone]  Fax: (   )    -  Follow Up Time:

## 2022-12-26 NOTE — DISCHARGE NOTE PROVIDER - CARE PROVIDERS DIRECT ADDRESSES
,DirectAddress_Unknown,DirectAddress_Unknown ,DirectAddress_Unknown,[email or web address] ,DirectAddress_Unknown,[email or web address],DirectAddress_Unknown

## 2022-12-26 NOTE — PROGRESS NOTE ADULT - PROBLEM SELECTOR PLAN 5
H/H remaining stable ~ 10-11s, no active signs of bleeding  -Iron studies c/w CARRILLO  -Pt initiated on IV iron, however now held i/s/o active infection  -Start PO Iron supplementation on d/c labile SBP on 12/25 (80-100s), currently stable  -Continue Lasix 40mg IV BID and transition to PO in AM  -Holding home Valsartan and Atenolol 2/2 labile BP, restart in AM if stable labile SBP on 12/25 (80-100s), currently stable  -Continue Lasix 40mg IV BID and transition to PO in AM  -Holding home Valsartan and Atenolol 2/2 labile BP, restart as tolerated h/o bioprosthetic AVR  -TTE 12/21 revealing functioning bioprosthetic AV w/ mild AR, mod MR and mod-severe TR  -Continue w/ above diuresis

## 2022-12-26 NOTE — PROGRESS NOTE ADULT - PROBLEM SELECTOR PLAN 1
near euvolemia, HD stable, SpO2 94% 2L NC  -TTE 12/21/22: LVEF 55-60%, mod-severe LVH, ESTER, bioprosthetic aortic valve w/ mild AR (peak 3.08, mean 22), mod MR, mod-severe TR, PASP 79mmHg,   -Repeat CXR 12/25 revealing improvement of vasc congestion  -net neg 2L/24H, c/w Lasix 40mg IV BID and likely transition to PO in AM  -Holding home Valsartan and Atenolol 2/2 labile SBP  -Core measure, daily weight, strict I&Os and 1L fluid restriction near euvolemia, HD stable, SpO2 94% 2L NC  -TTE 12/21/22: LVEF 55-60%, mod-severe LVH, ESTER, bioprosthetic aortic valve w/ mild AR (peak 3.08, mean 22), mod MR, mod-severe TR, PASP 79mmHg,   -Repeat CXR 12/25 revealing improvement of vasc congestion  -net neg 2L/24H, c/w Lasix 40mg IV BID and likely transition to PO in AM  -Holding home Valsartan and BB 2/2 labile SBP  -Core measure, daily weight, strict I&Os and 1L fluid restriction near euvolemia, HD stable, SpO2 94% 2L NC  -TTE 12/21/22: LVEF 55-60%, mod-severe LVH, ESTER, bioprosthetic aortic valve w/ mild AR (peak 3.08, mean 22), mod MR, mod-severe TR, PASP 79mmHg,   -Repeat CXR 12/25 revealing improvement of vasc congestion  -net neg 2L/24H, c/w Lasix 40mg IV BID and likely transition to PO in AM  -Holding home Valsartan and BB 2/2 labile SBP  -Wean off O2 as tolerated  -Core measure, daily weight, strict I&Os and 1L fluid restriction

## 2022-12-26 NOTE — DISCHARGE NOTE PROVIDER - NSDCFUADDAPPT_GEN_ALL_CORE_FT
PLEASE CALL YOUR PMD TO GET REPEAT LAB WORK (BASIC METABOLIC PANEL) IN 1 WEEK TO CHECK YOUR SODIUM LEVEL.     Medical Records Department: [phone]

## 2022-12-26 NOTE — DISCHARGE NOTE PROVIDER - HOSPITAL COURSE
***INCOMPLETE***    89F w/ PMHx HTN, HLD, AF on Eliquis (remote h/o ablation >10yrs ago), HFpEF, s/p bioprosthetic AVR, Hypothyroidism and TIA, admitted to cardiac tele for further management of acute decompensated CHF and AF w/ RVR. EP following, pending DCCV 12/27 w/ possible AVN ablation w/ PPM placement during this hospitalization. Hospital course c/b lethargy and fever on 12/24, pt found to have a UTI as well as fecal material seen in urine during soto placement. Pt currently on IV abx x 7 days and general surgery following for possible colovesical fistula.     Problem/Plan - 1:  ·  Problem: Chronic diastolic congestive heart failure.   ·  Plan: near euvolemia, HD stable, SpO2 94% 2L NC  -TTE 12/21/22: LVEF 55-60%, mod-severe LVH, ESTER, bioprosthetic aortic valve w/ mild AR (peak 3.08, mean 22), mod MR, mod-severe TR, PASP 79mmHg,   -Repeat CXR 12/25 revealing improvement of vasc congestion  -net neg 2L/24H, c/w Lasix 40mg IV BID and likely transition to PO in AM  -Holding home Valsartan and BB 2/2 labile SBP  -Wean off O2 as tolerated  -Core measure, daily weight, strict I&Os and 1L fluid restriction.       Problem/Plan - 2:  ·  Problem: Atrial fibrillation.   ·  Plan: presented in AF w/ RVR, currently rate controlled ~90s  -EP consulted, NPO after MN for DCCV 12/27. If unsuccessful will consider AVN ablation w/ PPM placement this hospitalization  -Initiated on Amio 200mg TID however on 12/24 HR uncontrolled and now s/p Digoxin 500mcg IV x 1 and s/p Amio gtt x 24hrs  -Continue Amio 200mg QD  -Holding Eliquis i/s/o possible surgical intervention, c/w Heparin gtt for now  -Holding Metoprolol 2/2 labile SBP.     Problem/Plan - 3:  ·  Problem: UTI (urinary tract infection).   ·  Plan: On 12/24 evening pt became lethargic w/ Tmax 100.9; currently non toxic appearing and HD stable  -Leukocytosis downtrending and has remained afebrile  -Blood Cx NGTD  -UA w/ mod LE, WBC >10 and bacteria  -UCx w/ E. Coli, pending susceptibility   -Continue Ceftriaxone 1g IV QD x 7 days (last dose 12/31)  -s/p soto placement for urinary retention      #R/O Colovesical Fistula  -During straight cath and soto placement fecal matter noted however may be 2/2 prior primafit in place and pt w/ diarrhea, per daughter  -Gen Surgery Team 4 consulted, no acute surgical intervention at this time  -f/u CT Abd/Pelvis results.       Problem/Plan - 4:  ·  Problem: HTN (hypertension).  ·  Plan: labile SBP on 12/25 (80-100s), currently stable  -Continue Lasix 40mg IV BID and transition to PO in AM  -Holding home Valsartan and Atenolol 2/2 labile BP, restart as tolerated.       Problem/Plan - 5:  ·  Problem: Valvular heart disease.  ·  Plan: h/o bioprosthetic AVR  -TTE 12/21 revealing functioning bioprosthetic AV w/ mild AR, mod MR and mod-severe TR  -Continue w/ above diuresis.       Problem/Plan - 6:  ·  Problem: HLD (hyperlipidemia).   ·  Plan: Lipids wnl  -Continue Atorvastatin 20mg HS.     Problem/Plan - 7:  ·  Problem: Microcytic anemia.   ·  Plan: H/H remaining stable ~ 10-11s, no active signs of bleeding  -Iron studies c/w CARRILLO  -Pt initiated on IV iron, however now held i/s/o active infection  -Start PO Iron supplementation on d/c.     Problem/Plan - 8:  ·  Problem: Elevated bilirubin.   ·  Plan: TBili 1.8, Direct Bili 0.4 and Indirect Bili 1.3  -RUQ US performed, pending results.     Problem/Plan - 9:  ·  Problem: Hypothyroidism.   ·  Plan: TFTs wnl  -Continue Levothyroxine 100mcg QD   Patient is a 89F w/ PMHx HTN, HLD, AF on Eliquis (remote h/o ablation >10yrs ago), HFpEF, s/p bioprosthetic AVR, Hypothyroidism and TIA, admitted to cardiac tele for further management of acute decompensated CHF and AF w/ RVR. Patient seen by EP, s/p successful DCCV on 12/27/22. Originally started on Amiodarone gtt for rate control, with transition to Amiodarone oral. Patient also w/ acute on chronic diastolic heart failure. Patient underwent diuresis with IV lasix and eventually transitioned to oral lasix on 1/2/23. Hospital course c/b UTI, s/p ceftriaxone 1000 mg IV x 5 days, failed TOV on 12/28/22 - reattempted TOV on 1/1/23 -   Patient's BP labile - holding home Valsartan. Patient w/ elevated bilirubin on admission, however resolved. RUQ US (12/24/22): diffuse dilatation CBD and mild intrahepatic duct dilatation (greater than expected postcholecystectomy). GI consulted initially for concern for enterovesicular fistula (imaging negative for this); no need to pursue MRCP in absence of clinical symptoms / resolved elevated LT's - can f/u w/ GI outpatient.    No significant events on telemetry overnight. Repeat EKG without ischemic changes. Patient has been medically cleared for discharge as per Dr. Dumont. Patient has been given appropriate discharge instructions including medication regimen, access site management and follow up. Medications that patient needs refills on have been e-prescribed to preferred pharmacy.   Patient to f/u with Dr. La in 6 weeks. Patient to f/u with Dr. Crystal David in 1 week from d/c (      ).   D/C meds: Amiodarone 200 mg daily, Lasix 40 mg daily, Toprol XL 25 mg daily, Spironolactone 25 mg BID, Eliquis 5 mg BID, atorvastatin 20 mg daily, levothyroxine 100 mcg once daily     Cardiac Rehab (CHF) to patient: YES          *Referral and Prescription Given for Cardiac Rehab : NO   Reasons for No Cardiac Rehab Referral Rx (Must select 1 or more options): Pt discharged to Nursing Care/[facility]/Long term Care Facility   Patient is a 89F w/ PMHx HTN, HLD, AF on Eliquis (remote h/o ablation >10yrs ago), HFpEF, s/p bioprosthetic AVR, Hypothyroidism and TIA, admitted to cardiac tele for further management of acute decompensated CHF and AF w/ RVR. Patient seen by EP, s/p successful DCCV on 12/27/22. Originally started on Amiodarone gtt for rate control, with transition to Amiodarone oral. Patient also w/ acute on chronic diastolic heart failure. Patient underwent diuresis with IV lasix and eventually transitioned to oral lasix on 1/2/23. Hospital course c/b UTI, s/p ceftriaxone 1000 mg IV x 5 days, failed TOV on 12/28/22 - reattempted TOV on 1/1/23 which she also failed. Douglas was replaced on   Patient's BP labile - holding home Valsartan. Patient w/ elevated bilirubin on admission, however resolved. RUQ US (12/24/22): diffuse dilatation CBD and mild intrahepatic duct dilatation (greater than expected postcholecystectomy). GI consulted initially for concern for enterovesicular fistula (imaging negative for this); no need to pursue MRCP in absence of clinical symptoms / resolved elevated LT's - can f/u w/ GI outpatient.    No significant events on telemetry overnight. Repeat EKG without ischemic changes. Patient has been medically cleared for discharge as per Dr. Dumont. Patient has been given appropriate discharge instructions including medication regimen, access site management and follow up. Medications that patient needs refills on have been e-prescribed to preferred pharmacy.   Patient to f/u with Dr. La in 6 weeks. Patient to f/u with Dr. Crystal David in 1 week from d/c (      ).   D/C meds: Amiodarone 200 mg daily, Lasix 40 mg daily, Toprol XL 25 mg daily, Spironolactone 25 mg BID, Eliquis 5 mg BID, atorvastatin 20 mg daily, levothyroxine 100 mcg once daily     Cardiac Rehab (CHF) to patient: YES          *Referral and Prescription Given for Cardiac Rehab : NO   Reasons for No Cardiac Rehab Referral Rx (Must select 1 or more options): Pt discharged to Nursing Care/[facility]/Long term Care Facility   Patient is a 89F w/ PMHx HTN, HLD, AF on Eliquis (remote h/o ablation >10yrs ago), HFpEF, s/p bioprosthetic AVR, Hypothyroidism and TIA, admitted to cardiac tele for further management of acute decompensated CHF and AF w/ RVR. Patient seen by EP, s/p successful DCCV on 12/27/22. Originally started on Amiodarone gtt for rate control, with transition to Amiodarone oral. Patient also w/ acute on chronic diastolic heart failure. Patient underwent diuresis with IV lasix and eventually transitioned to oral lasix on 1/2/23. Hospital course complicated by hyponatremia; changed Lasix to 40mg every other day and continued fluid restriction. Hospital course c/b UTI, s/p ceftriaxone 1000 mg IV x 5 days, failed TOV on 12/28/22 - reattempted TOV on 1/1/23 which she also failed. Douglas was replaced on 1/2/23.   Patient's BP labile - holding home Valsartan. Patient w/ elevated bilirubin on admission, however resolved. RUQ US (12/24/22): diffuse dilatation CBD and mild intrahepatic duct dilatation (greater than expected postcholecystectomy). GI consulted initially for concern for enterovesicular fistula (imaging negative for this); no need to pursue MRCP in absence of clinical symptoms / resolved elevated LT's - can f/u w/ GI outpatient.    No significant events on telemetry overnight. Repeat EKG without ischemic changes. Patient has been medically cleared for discharge as per Dr. Dumont. Patient has been given appropriate discharge instructions including medication regimen, access site management and follow up. Medications that patient needs refills on have been e-prescribed to preferred pharmacy.   Patient to f/u with Dr. La in 6 weeks. Patient to f/u with Dr. Crystal David in 1 week from d/c (      ).   D/C meds: Amiodarone 200 mg daily, Lasix 40 mg every other day, Toprol XL 25 mg daily, Spironolactone 25 mg BID, Eliquis 5 mg BID, atorvastatin 20 mg daily, levothyroxine 100 mcg once daily     Cardiac Rehab (CHF) to patient: YES          *Referral and Prescription Given for Cardiac Rehab : NO   Reasons for No Cardiac Rehab Referral Rx (Must select 1 or more options): Pt discharged to Nursing Care/[facility]/Long term Care Facility   Patient is a 89F w/ PMHx HTN, HLD, AF on Eliquis (remote h/o ablation >10yrs ago), HFpEF, s/p bioprosthetic AVR, Hypothyroidism and TIA, admitted to cardiac tele for further management of acute decompensated CHF and AF w/ RVR. Patient seen by EP, s/p successful DCCV on 12/27/22. Originally started on Amiodarone gtt for rate control, with transition to Amiodarone oral. Patient also w/ acute on chronic diastolic heart failure. Patient underwent diuresis with IV lasix and eventually transitioned to oral lasix on 1/2/23. Hospital course complicated by hyponatremia; changed Lasix to 40mg every other day and continued fluid restriction. Hospital course c/b UTI, s/p ceftriaxone 1000 mg IV x 5 days, failed TOV on 12/28/22 - reattempted TOV on 1/1/23 which she also failed. Douglas was replaced on 1/2/23.   Patient's BP labile - holding home Valsartan. Patient w/ elevated bilirubin on admission, however resolved. RUQ US (12/24/22): diffuse dilatation CBD and mild intrahepatic duct dilatation (greater than expected postcholecystectomy). GI consulted initially for concern for enterovesicular fistula (imaging negative for this); no need to pursue MRCP in absence of clinical symptoms / resolved elevated LT's - can f/u w/ GI outpatient.    No significant events on telemetry overnight. Repeat EKG without ischemic changes. Patient has been medically cleared for discharge as per Dr. Dumont. Patient has been given appropriate discharge instructions including medication regimen, access site management and follow up. Medications that patient needs refills on have been e-prescribed to preferred pharmacy.   Patient to f/u with Dr. La in 6 weeks. Patient to f/u with Dr. Crystal David in 1 week from d/c on ________________.   D/C meds: Amiodarone 200 mg daily, Lasix 40 mg every other day, Toprol XL 25 mg daily, Spironolactone 25 mg BID, Eliquis 5 mg BID, atorvastatin 20 mg daily, levothyroxine 100 mcg once daily     Cardiac Rehab (CHF) to patient: YES          *Referral and Prescription Given for Cardiac Rehab : NO   Reasons for No Cardiac Rehab Referral Rx (Must select 1 or more options): Pt discharged to Nursing Care/[facility]/Long term Care Facility   Patient is a 89F w/ PMHx HTN, HLD, AF on Eliquis (remote h/o ablation >10yrs ago), HFpEF, s/p bioprosthetic AVR, Hypothyroidism and TIA, admitted to cardiac tele for further management of acute decompensated CHF and AF w/ RVR. Patient seen by EP, s/p successful DCCV on 12/27/22. Originally started on Amiodarone gtt for rate control, with transition to Amiodarone oral. Patient also w/ acute on chronic diastolic heart failure. Patient underwent diuresis with IV lasix and eventually transitioned to oral lasix on 1/2/23. Hospital course complicated by hyponatremia; changed Lasix to 40mg every other day and continued fluid restriction. Hospital course c/b UTI, s/p ceftriaxone 1000 mg IV x 5 days, failed TOV on 12/28/22 - reattempted TOV on 1/1/23 which she also failed. Douglas was replaced on 1/2/23.   Patient's BP labile - holding home Valsartan. Patient w/ elevated bilirubin on admission, however resolved. RUQ US (12/24/22): diffuse dilatation CBD and mild intrahepatic duct dilatation (greater than expected postcholecystectomy). GI consulted initially for concern for enterovesicular fistula (imaging negative for this); no need to pursue MRCP in absence of clinical symptoms / resolved elevated LT's - can f/u w/ GI outpatient.    No significant events on telemetry overnight. Repeat EKG without ischemic changes. Patient has been medically cleared for discharge as per Dr. Donnelly. Patient has been given appropriate discharge instructions including medication regimen, access site management and follow up. Medications that patient needs refills on have been e-prescribed to preferred pharmacy.   Patient to f/u with Dr. La in 6 weeks. Patient to f/u with Dr. Crystal David in 1 week from d/c on 1/4/23 @ 2pm.   D/C meds: Amiodarone 200 mg daily, Lasix 40 mg every other day, Toprol XL 25 mg daily, Spironolactone 25 mg BID, Eliquis 5 mg BID, atorvastatin 20 mg daily, levothyroxine 100 mcg once daily     Cardiac Rehab (CHF) to patient: YES          *Referral and Prescription Given for Cardiac Rehab : NO   Reasons for No Cardiac Rehab Referral Rx (Must select 1 or more options): Pt discharged to Nursing Care/[facility]/Long term Care Facility

## 2022-12-26 NOTE — DISCHARGE NOTE PROVIDER - NSDCCPCAREPLAN_GEN_ALL_CORE_FT
PRINCIPAL DISCHARGE DIAGNOSIS  Diagnosis: Atrial fibrillation  Assessment and Plan of Treatment: You were found to be in atrial fibrillation (a type of irregular heartbeat) here in the hospital and underwent a cardioversion that shocked your heart back into a normal sinus rhythm. People with atrial fibrillation are at an increased risk of forming a blood clot in the heart, which can travel to the brain, causing a stroke, or to other parts of the body. You have been placed on a Eliquis 5 mg twice daily , it is important you take this medication as directed. ELIQUIS lowers your chance of having a stroke by helping to prevent clots from forming. If you stop taking ELIQUIS, you may have increased risk of forming a blood clot in your heart which can cause a stroke. Do not stop taking ELIQUIS without talking to your cardiologist. Additionally it is important to make sure your heart rate stays controlled, you have been started on METOPROLOL SUCCINATE (TOPROL XL) 25 mg daily to help control your heart rate. PLEASE STOP TAKING your ATENOLOL.  Please follow-up with your cardiologist for further managment.  You should follow-up with Dr. La in 6 weeks. Additionally, please follow-up with your cardiologist. Dr. David in 1-2 weeks.      SECONDARY DISCHARGE DIAGNOSES  Diagnosis: Acute diastolic heart failure  Assessment and Plan of Treatment: -You have a history of weakened heart muscle called congestive heart failure. This means that your heart does not pump blood to the rest of the body as it normally should. You should take (INSERT DIURETIC) to help reduce the amount of excess fluid that can back up to the legs, kidneys, and lungs.    -Please make sure you follow up with your cardiologist within one week of discharge.   -Please weigh yourself daily: if you have gained more than 2-3 lbs in one day or 5 lbs in one week contact your doctor immediately as you may be retaining water weight  -In addition, restrict your salt intake to less than 2 grams a day  - PLEASE FOLLOW-up with   in 1 week of discharge.   -If you develop worsening shortening of breath, leg swelling, fatigue, chest pain, difficulty sleeping at night due to shortness of breath, contact your cardiologist immediately.       PRINCIPAL DISCHARGE DIAGNOSIS  Diagnosis: Atrial fibrillation  Assessment and Plan of Treatment: You were found to be in atrial fibrillation (a type of irregular heartbeat) here in the hospital and underwent a cardioversion that shocked your heart back into a normal sinus rhythm. People with atrial fibrillation are at an increased risk of forming a blood clot in the heart, which can travel to the brain, causing a stroke, or to other parts of the body. You have been placed on a Eliquis 5 mg twice daily , it is important you take this medication as directed. ELIQUIS lowers your chance of having a stroke by helping to prevent clots from forming. If you stop taking ELIQUIS, you may have increased risk of forming a blood clot in your heart which can cause a stroke. Do not stop taking ELIQUIS without talking to your cardiologist. Additionally it is important to make sure your heart rate stays controlled, you have been started on METOPROLOL SUCCINATE (TOPROL XL) 25 mg daily to help control your heart rate. PLEASE STOP TAKING your ATENOLOL.  Please follow-up with your cardiologist for further managment.  You should follow-up with Dr. La in 6 weeks. Additionally, please follow-up with your cardiologist. Dr. David in 1-2 weeks.      SECONDARY DISCHARGE DIAGNOSES  Diagnosis: Acute diastolic heart failure  Assessment and Plan of Treatment: -You have a history of weakened heart muscle called congestive heart failure. This means that your heart does not pump blood to the rest of the body as it normally should. You should take (INSERT DIURETIC) to help reduce the amount of excess fluid that can back up to the legs, kidneys, and lungs.    -Please make sure you follow up with your cardiologist within one week of discharge.   -Please weigh yourself daily: if you have gained more than 2-3 lbs in one day or 5 lbs in one week contact your doctor immediately as you may be retaining water weight  -In addition, restrict your salt intake to less than 2 grams a day  - PLEASE FOLLOW-up with   in 1 week of discharge.   -If you develop worsening shortening of breath, leg swelling, fatigue, chest pain, difficulty sleeping at night due to shortness of breath, contact your cardiologist immediately.      Diagnosis: Hypertension  Assessment and Plan of Treatment: You have a diagnosis of Hypertension or elevated blood pressure. Please continue taking your medications as listed to keep your blood pressure controlled. In addition, there are multiple lifestyle modifications that have been proven to lower blood pressure: maintaining a healthy body weight, engaging in regular physical activity for at least 30 minutes per day on most days, and consuming a diet rich in fruits, vegetables, and low-fat dairy products with a reduced amount of total and saturated fats and sodium. Please STOP taking your valsartan. Please start taking TOPROL XL (METOPROLOL SUCCINATE) 25 mg once daily.   For blood pressures at home that are too high or low please see your Doctor or go to the Emergency Room as necessary.      Diagnosis: Elevated bilirubin  Assessment and Plan of Treatment: One of your liver enzymes, known as bilirubin, was elevated during your admission. The GI team saw you and determined there was no need for urgent intervention. Please follow-up with gastroenterology as outpatient for further management.     PRINCIPAL DISCHARGE DIAGNOSIS  Diagnosis: Atrial fibrillation  Assessment and Plan of Treatment: You were found to be in atrial fibrillation (a type of irregular heartbeat) here in the hospital and underwent a cardioversion that shocked your heart back into a normal sinus rhythm. People with atrial fibrillation are at an increased risk of forming a blood clot in the heart, which can travel to the brain, causing a stroke, or to other parts of the body. You have been placed on a Eliquis 5 mg twice daily , it is important you take this medication as directed. ELIQUIS lowers your chance of having a stroke by helping to prevent clots from forming. If you stop taking ELIQUIS, you may have increased risk of forming a blood clot in your heart which can cause a stroke. Do not stop taking ELIQUIS without talking to your cardiologist. Additionally it is important to make sure your heart rate stays controlled, you have been started on METOPROLOL SUCCINATE (TOPROL XL) 25 mg daily to help control your heart rate. PLEASE STOP TAKING your ATENOLOL.  Please follow-up with your cardiologist for further managment.  You should follow-up with Dr. La in 6 weeks. Additionally, please follow-up with your cardiologist. Dr. David in 1-2 weeks.      SECONDARY DISCHARGE DIAGNOSES  Diagnosis: Acute diastolic heart failure  Assessment and Plan of Treatment: -You have a history of weakened heart muscle called congestive heart failure. This means that your heart does not pump blood to the rest of the body as it normally should. You should take (INSERT DIURETIC) to help reduce the amount of excess fluid that can back up to the legs, kidneys, and lungs.    -Please make sure you follow up with your cardiologist within one week of discharge.   -Please weigh yourself daily: if you have gained more than 2-3 lbs in one day or 5 lbs in one week contact your doctor immediately as you may be retaining water weight  -In addition, restrict your salt intake to less than 2 grams a day  - PLEASE FOLLOW-up with Dr. David  in 1 week of discharge.   -If you develop worsening shortening of breath, leg swelling, fatigue, chest pain, difficulty sleeping at night due to shortness of breath, contact your cardiologist immediately.      Diagnosis: Hypertension  Assessment and Plan of Treatment: You have a diagnosis of Hypertension or elevated blood pressure. Please continue taking your medications as listed to keep your blood pressure controlled. In addition, there are multiple lifestyle modifications that have been proven to lower blood pressure: maintaining a healthy body weight, engaging in regular physical activity for at least 30 minutes per day on most days, and consuming a diet rich in fruits, vegetables, and low-fat dairy products with a reduced amount of total and saturated fats and sodium. Please STOP taking your valsartan. Please start taking TOPROL XL (METOPROLOL SUCCINATE) 25 mg once daily.   For blood pressures at home that are too high or low please see your Doctor or go to the Emergency Room as necessary.      Diagnosis: Elevated bilirubin  Assessment and Plan of Treatment: One of your liver enzymes, known as bilirubin, was elevated during your admission. The GI team saw you and determined there was no need for urgent intervention. Please follow-up with gastroenterology as outpatient for further management.     PRINCIPAL DISCHARGE DIAGNOSIS  Diagnosis: Atrial fibrillation  Assessment and Plan of Treatment: You were found to be in atrial fibrillation (a type of irregular heartbeat) here in the hospital and underwent a cardioversion that shocked your heart back into a normal sinus rhythm. People with atrial fibrillation are at an increased risk of forming a blood clot in the heart, which can travel to the brain, causing a stroke, or to other parts of the body. You have been placed on a Eliquis 5 mg twice daily , it is important you take this medication as directed. ELIQUIS lowers your chance of having a stroke by helping to prevent clots from forming. If you stop taking ELIQUIS, you may have increased risk of forming a blood clot in your heart which can cause a stroke. Do not stop taking ELIQUIS without talking to your cardiologist. Additionally it is important to make sure your heart rate stays controlled, you have been started on METOPROLOL SUCCINATE (TOPROL XL) 25 mg daily to help control your heart rate. PLEASE STOP TAKING your ATENOLOL.  Please follow-up with your cardiologist for further managment.  You should follow-up with Dr. La in 6 weeks. Additionally, please follow-up with your cardiologist. Dr. David on ________________.      SECONDARY DISCHARGE DIAGNOSES  Diagnosis: Acute diastolic heart failure  Assessment and Plan of Treatment: -You have a history of weakened heart muscle called congestive heart failure. This means that your heart does not pump blood to the rest of the body as it normally should. You should take lasix 40 mg oral once a day EVERY OTHER DAY to help reduce the amount of excess fluid that can back up to the legs, kidneys, and lungs.    -Please make sure you follow up with your cardiologist within one week of discharge.   -Please weigh yourself daily: if you have gained more than 2-3 lbs in one day or 5 lbs in one week contact your doctor immediately as you may be retaining water weight  -In addition, restrict your salt intake to less than 2 grams a day  - PLEASE FOLLOW-up with Dr. David  in 1 week of discharge ___________.   -If you develop worsening shortening of breath, leg swelling, fatigue, chest pain, difficulty sleeping at night due to shortness of breath, contact your cardiologist immediately.      Diagnosis: Hypertension  Assessment and Plan of Treatment: You have a diagnosis of Hypertension or elevated blood pressure. Please continue taking your medications as listed to keep your blood pressure controlled. In addition, there are multiple lifestyle modifications that have been proven to lower blood pressure: maintaining a healthy body weight, engaging in regular physical activity for at least 30 minutes per day on most days, and consuming a diet rich in fruits, vegetables, and low-fat dairy products with a reduced amount of total and saturated fats and sodium. Please STOP taking your valsartan. Please start taking TOPROL XL (METOPROLOL SUCCINATE) 25 mg once daily.   For blood pressures at home that are too high or low please see your Doctor or go to the Emergency Room as necessary.      Diagnosis: Elevated bilirubin  Assessment and Plan of Treatment: One of your liver enzymes, known as bilirubin, was elevated during your admission. The GI team saw you and determined there was no need for urgent intervention. Please follow-up with gastroenterology as outpatient for further management.     PRINCIPAL DISCHARGE DIAGNOSIS  Diagnosis: Atrial fibrillation  Assessment and Plan of Treatment: You were found to be in atrial fibrillation (a type of irregular heartbeat) here in the hospital and underwent a cardioversion that shocked your heart back into a normal sinus rhythm. People with atrial fibrillation are at an increased risk of forming a blood clot in the heart, which can travel to the brain, causing a stroke, or to other parts of the body. You have been placed on a Eliquis 5 mg twice daily , it is important you take this medication as directed. ELIQUIS lowers your chance of having a stroke by helping to prevent clots from forming. If you stop taking ELIQUIS, you may have increased risk of forming a blood clot in your heart which can cause a stroke. Do not stop taking ELIQUIS without talking to your cardiologist. Additionally it is important to make sure your heart rate stays controlled, you have been started on METOPROLOL SUCCINATE (TOPROL XL) 25 mg daily to help control your heart rate. PLEASE STOP TAKING your ATENOLOL.  Please follow-up with your cardiologist for further managment.  You should follow-up with Dr. La in 6 weeks. Additionally, please follow-up with your cardiologist. Dr. David on ________________.      SECONDARY DISCHARGE DIAGNOSES  Diagnosis: Acute diastolic heart failure  Assessment and Plan of Treatment: -You have a history of weakened heart muscle called congestive heart failure. This means that your heart does not pump blood to the rest of the body as it normally should. You should take lasix 40 mg oral once a day EVERY OTHER DAY to help reduce the amount of excess fluid that can back up to the legs, kidneys, and lungs.    -Please make sure you follow up with your cardiologist within one week of discharge.   -Please weigh yourself daily: if you have gained more than 2-3 lbs in one day or 5 lbs in one week contact your doctor immediately as you may be retaining water weight  -In addition, restrict your salt intake to less than 2 grams a day  - PLEASE FOLLOW-up with Dr. David  in 1 week of discharge ___________.   -If you develop worsening shortening of breath, leg swelling, fatigue, chest pain, difficulty sleeping at night due to shortness of breath, contact your cardiologist immediately.      Diagnosis: Hypertension  Assessment and Plan of Treatment: You have a diagnosis of Hypertension or elevated blood pressure. Please continue taking your medications as listed to keep your blood pressure controlled. In addition, there are multiple lifestyle modifications that have been proven to lower blood pressure: maintaining a healthy body weight, engaging in regular physical activity for at least 30 minutes per day on most days, and consuming a diet rich in fruits, vegetables, and low-fat dairy products with a reduced amount of total and saturated fats and sodium. Please STOP taking your valsartan. Please start taking TOPROL XL (METOPROLOL SUCCINATE) 25 mg once daily.   For blood pressures at home that are too high or low please see your Doctor or go to the Emergency Room as necessary.      Diagnosis: Elevated bilirubin  Assessment and Plan of Treatment: One of your liver enzymes, known as bilirubin, was elevated during your admission. The GI team saw you and determined there was no need for urgent intervention. Please follow-up with gastroenterology as outpatient for further management.    Diagnosis: Urinary retention  Assessment and Plan of Treatment: Please call the Urology Clinic at [facility] in 1-2 weeks to arrange for a follow up appointment for the soto catheter.     PRINCIPAL DISCHARGE DIAGNOSIS  Diagnosis: Atrial fibrillation  Assessment and Plan of Treatment: You were found to be in atrial fibrillation (a type of irregular heartbeat) here in the hospital and underwent a cardioversion that shocked your heart back into a normal sinus rhythm. People with atrial fibrillation are at an increased risk of forming a blood clot in the heart, which can travel to the brain, causing a stroke, or to other parts of the body. You have been placed on a Eliquis 5 mg twice daily , it is important you take this medication as directed. ELIQUIS lowers your chance of having a stroke by helping to prevent clots from forming. If you stop taking ELIQUIS, you may have increased risk of forming a blood clot in your heart which can cause a stroke. Do not stop taking ELIQUIS without talking to your cardiologist. Additionally it is important to make sure your heart rate stays controlled, you have been started on METOPROLOL SUCCINATE (TOPROL XL) 25 mg daily to help control your heart rate. PLEASE STOP TAKING your ATENOLOL.  Please follow-up with your cardiologist for further managment.  You should follow-up with Dr. La in 6 weeks. Additionally, please follow-up with your cardiologist. Dr. David on 1/4/2023 @ 2pm at the [address].      SECONDARY DISCHARGE DIAGNOSES  Diagnosis: Acute diastolic heart failure  Assessment and Plan of Treatment: -You have a history of weakened heart muscle called congestive heart failure. This means that your heart does not pump blood to the rest of the body as it normally should. You should take lasix 40 mg oral once a day EVERY OTHER DAY to help reduce the amount of excess fluid that can back up to the legs, kidneys, and lungs.    -Please make sure you follow up with your cardiologist within one week of discharge.   -Please weigh yourself daily: if you have gained more than 2-3 lbs in one day or 5 lbs in one week contact your doctor immediately as you may be retaining water weight  -In addition, restrict your salt intake to less than 2 grams a day  - PLEASE FOLLOW-up with Dr. David  in 1 week of discharge on 1/4/2023 @ 2pm  -If you develop worsening shortening of breath, leg swelling, fatigue, chest pain, difficulty sleeping at night due to shortness of breath, contact your cardiologist immediately.      Diagnosis: Hypertension  Assessment and Plan of Treatment: You have a diagnosis of Hypertension or elevated blood pressure. Please continue taking your medications as listed to keep your blood pressure controlled. In addition, there are multiple lifestyle modifications that have been proven to lower blood pressure: maintaining a healthy body weight, engaging in regular physical activity for at least 30 minutes per day on most days, and consuming a diet rich in fruits, vegetables, and low-fat dairy products with a reduced amount of total and saturated fats and sodium. Please STOP taking your valsartan. Please start taking TOPROL XL (METOPROLOL SUCCINATE) 25 mg once daily.   For blood pressures at home that are too high or low please see your Doctor or go to the Emergency Room as necessary.      Diagnosis: Elevated bilirubin  Assessment and Plan of Treatment: One of your liver enzymes, known as bilirubin, was elevated during your admission. The GI team saw you and determined there was no need for urgent intervention. Please follow-up with gastroenterology as outpatient for further management.    Diagnosis: Urinary retention  Assessment and Plan of Treatment: Please call the Urology Clinic at [facility] in 1-2 weeks to arrange for a follow up appointment for the soto catheter.

## 2022-12-26 NOTE — PROGRESS NOTE ADULT - PROBLEM SELECTOR PLAN 8
Lipids wnl  -Continue Atorvastatin 20mg HS TBili 1.8, Direct Bili 0.4 and Indirect Bili 1.3  -RUQ US performed, pending results

## 2022-12-26 NOTE — PROGRESS NOTE ADULT - ASSESSMENT
89yo F w/ PMHx of HTN, HLD, Hypothyroidism, TIA, s/p AVR, AFib (on Eliquis, s/p ablation > 10yrs ago), CHF (per outpatient bedside echo EF appears reduced) who is admitted to [facility] with CHF exacerbation. General surgery was consulted for fecaluria and abd Xray findings with possible obstruction of small bowel. Patient is hypotensive and tachycardic - possibly attributed to afib vs infectious etiology. WBC 15, Hb stable (11.3), lactate 1.2. Physical exam concerning for soto with fecaluria as well as abdominal distention with tenderness in LLQ/epigastric region. Differential includes SBO, although unlikely given patient is not clinically obstructed vs diverticulitis with colovesical fistula.     Plan:  - No acute surgical intervention indicated at this time. Continue fluid resuscitation per primary  - Please obtain follow final read for CT scan performed yesterday.  - Please repeat UA given prior UA could have been contaminated by fecal incontinence   - Surgery Team 4C will continue to follow. Please page Team 4 with questions/clinical changes. [phone]

## 2022-12-27 LAB
-  AMPICILLIN/SULBACTAM: SIGNIFICANT CHANGE UP
-  AMPICILLIN/SULBACTAM: SIGNIFICANT CHANGE UP
-  AMPICILLIN: SIGNIFICANT CHANGE UP
-  AMPICILLIN: SIGNIFICANT CHANGE UP
-  CEFAZOLIN: SIGNIFICANT CHANGE UP
-  CEFAZOLIN: SIGNIFICANT CHANGE UP
-  CEFEPIME: SIGNIFICANT CHANGE UP
-  CEFTRIAXONE: SIGNIFICANT CHANGE UP
-  CEFTRIAXONE: SIGNIFICANT CHANGE UP
-  CIPROFLOXACIN: SIGNIFICANT CHANGE UP
-  CIPROFLOXACIN: SIGNIFICANT CHANGE UP
-  ERTAPENEM: SIGNIFICANT CHANGE UP
-  ERTAPENEM: SIGNIFICANT CHANGE UP
-  GENTAMICIN: SIGNIFICANT CHANGE UP
-  GENTAMICIN: SIGNIFICANT CHANGE UP
-  NITROFURANTOIN: SIGNIFICANT CHANGE UP
-  NITROFURANTOIN: SIGNIFICANT CHANGE UP
-  PIPERACILLIN/TAZOBACTAM: SIGNIFICANT CHANGE UP
-  PIPERACILLIN/TAZOBACTAM: SIGNIFICANT CHANGE UP
-  TOBRAMYCIN: SIGNIFICANT CHANGE UP
-  TOBRAMYCIN: SIGNIFICANT CHANGE UP
-  TRIMETHOPRIM/SULFAMETHOXAZOLE: SIGNIFICANT CHANGE UP
-  TRIMETHOPRIM/SULFAMETHOXAZOLE: SIGNIFICANT CHANGE UP
ALBUMIN SERPL ELPH-MCNC: 2.7 G/DL — LOW (ref 3.3–5)
ALP SERPL-CCNC: 70 U/L — SIGNIFICANT CHANGE UP (ref 40–120)
ALT FLD-CCNC: 20 U/L — SIGNIFICANT CHANGE UP (ref 10–45)
ANION GAP SERPL CALC-SCNC: 7 MMOL/L — SIGNIFICANT CHANGE UP (ref 5–17)
APTT BLD: 29.7 SEC — SIGNIFICANT CHANGE UP (ref 27.5–35.5)
AST SERPL-CCNC: 24 U/L — SIGNIFICANT CHANGE UP (ref 10–40)
BASOPHILS # BLD AUTO: 0.04 K/UL — SIGNIFICANT CHANGE UP (ref 0–0.2)
BASOPHILS NFR BLD AUTO: 0.5 % — SIGNIFICANT CHANGE UP (ref 0–2)
BILIRUB SERPL-MCNC: 1 MG/DL — SIGNIFICANT CHANGE UP (ref 0.2–1.2)
BUN SERPL-MCNC: 15 MG/DL — SIGNIFICANT CHANGE UP (ref 7–23)
CALCIUM SERPL-MCNC: 8.3 MG/DL — LOW (ref 8.4–10.5)
CHLORIDE SERPL-SCNC: 93 MMOL/L — LOW (ref 96–108)
CO2 SERPL-SCNC: 32 MMOL/L — HIGH (ref 22–31)
CREAT SERPL-MCNC: 0.49 MG/DL — LOW (ref 0.5–1.3)
CULTURE RESULTS: SIGNIFICANT CHANGE UP
EGFR: 87 ML/MIN/1.73M2 — SIGNIFICANT CHANGE UP
EOSINOPHIL # BLD AUTO: 0.24 K/UL — SIGNIFICANT CHANGE UP (ref 0–0.5)
EOSINOPHIL NFR BLD AUTO: 3.2 % — SIGNIFICANT CHANGE UP (ref 0–6)
GLUCOSE SERPL-MCNC: 120 MG/DL — HIGH (ref 70–99)
HCT VFR BLD CALC: 33.6 % — LOW (ref 34.5–45)
HGB BLD-MCNC: 10.4 G/DL — LOW (ref 11.5–15.5)
IMM GRANULOCYTES NFR BLD AUTO: 1.3 % — HIGH (ref 0–0.9)
LYMPHOCYTES # BLD AUTO: 0.9 K/UL — LOW (ref 1–3.3)
LYMPHOCYTES # BLD AUTO: 12 % — LOW (ref 13–44)
MAGNESIUM SERPL-MCNC: 2 MG/DL — SIGNIFICANT CHANGE UP (ref 1.6–2.6)
MCHC RBC-ENTMCNC: 24.5 PG — LOW (ref 27–34)
MCHC RBC-ENTMCNC: 31 GM/DL — LOW (ref 32–36)
MCV RBC AUTO: 79.1 FL — LOW (ref 80–100)
METHOD TYPE: SIGNIFICANT CHANGE UP
METHOD TYPE: SIGNIFICANT CHANGE UP
MONOCYTES # BLD AUTO: 0.98 K/UL — HIGH (ref 0–0.9)
MONOCYTES NFR BLD AUTO: 13.1 % — SIGNIFICANT CHANGE UP (ref 2–14)
NEUTROPHILS # BLD AUTO: 5.22 K/UL — SIGNIFICANT CHANGE UP (ref 1.8–7.4)
NEUTROPHILS NFR BLD AUTO: 69.9 % — SIGNIFICANT CHANGE UP (ref 43–77)
NRBC # BLD: 0 /100 WBCS — SIGNIFICANT CHANGE UP (ref 0–0)
ORGANISM # SPEC MICROSCOPIC CNT: SIGNIFICANT CHANGE UP
PLATELET # BLD AUTO: 220 K/UL — SIGNIFICANT CHANGE UP (ref 150–400)
POTASSIUM SERPL-MCNC: 4.2 MMOL/L — SIGNIFICANT CHANGE UP (ref 3.5–5.3)
POTASSIUM SERPL-SCNC: 4.2 MMOL/L — SIGNIFICANT CHANGE UP (ref 3.5–5.3)
PROT SERPL-MCNC: 6 G/DL — SIGNIFICANT CHANGE UP (ref 6–8.3)
RBC # BLD: 4.25 M/UL — SIGNIFICANT CHANGE UP (ref 3.8–5.2)
RBC # FLD: 17.2 % — HIGH (ref 10.3–14.5)
SODIUM SERPL-SCNC: 132 MMOL/L — LOW (ref 135–145)
SPECIMEN SOURCE: SIGNIFICANT CHANGE UP
WBC # BLD: 7.48 K/UL — SIGNIFICANT CHANGE UP (ref 3.8–10.5)
WBC # FLD AUTO: 7.48 K/UL — SIGNIFICANT CHANGE UP (ref 3.8–10.5)

## 2022-12-27 PROCEDURE — 99233 SBSQ HOSP IP/OBS HIGH 50: CPT

## 2022-12-27 PROCEDURE — 93010 ELECTROCARDIOGRAM REPORT: CPT

## 2022-12-27 PROCEDURE — 92960 CARDIOVERSION ELECTRIC EXT: CPT

## 2022-12-27 RX ORDER — APIXABAN 2.5 MG/1
5 TABLET, FILM COATED ORAL EVERY 12 HOURS
Refills: 0 | Status: DISCONTINUED | OUTPATIENT
Start: 2022-12-27 | End: 2022-12-27

## 2022-12-27 RX ORDER — HEPARIN SODIUM 5000 [USP'U]/ML
900 INJECTION INTRAVENOUS; SUBCUTANEOUS
Qty: 25000 | Refills: 0 | Status: DISCONTINUED | OUTPATIENT
Start: 2022-12-28 | End: 2022-12-28

## 2022-12-27 RX ORDER — METOPROLOL TARTRATE 50 MG
12.5 TABLET ORAL
Refills: 0 | Status: DISCONTINUED | OUTPATIENT
Start: 2022-12-27 | End: 2022-12-28

## 2022-12-27 RX ORDER — HEPARIN SODIUM 5000 [USP'U]/ML
6000 INJECTION INTRAVENOUS; SUBCUTANEOUS EVERY 6 HOURS
Refills: 0 | Status: DISCONTINUED | OUTPATIENT
Start: 2022-12-27 | End: 2022-12-28

## 2022-12-27 RX ORDER — HEPARIN SODIUM 5000 [USP'U]/ML
3000 INJECTION INTRAVENOUS; SUBCUTANEOUS EVERY 6 HOURS
Refills: 0 | Status: DISCONTINUED | OUTPATIENT
Start: 2022-12-27 | End: 2022-12-28

## 2022-12-27 RX ADMIN — CEFTRIAXONE 100 MILLIGRAM(S): 500 INJECTION, POWDER, FOR SOLUTION INTRAMUSCULAR; INTRAVENOUS at 11:07

## 2022-12-27 RX ADMIN — Medication 500 MICROGRAM(S): at 11:04

## 2022-12-27 RX ADMIN — APIXABAN 5 MILLIGRAM(S): 2.5 TABLET, FILM COATED ORAL at 06:36

## 2022-12-27 RX ADMIN — Medication 500 MICROGRAM(S): at 21:36

## 2022-12-27 RX ADMIN — Medication 500 MICROGRAM(S): at 04:06

## 2022-12-27 RX ADMIN — Medication 12.5 MILLIGRAM(S): at 21:35

## 2022-12-27 RX ADMIN — APIXABAN 5 MILLIGRAM(S): 2.5 TABLET, FILM COATED ORAL at 18:53

## 2022-12-27 RX ADMIN — Medication 100 MICROGRAM(S): at 06:26

## 2022-12-27 RX ADMIN — ALBUTEROL 2.5 MILLIGRAM(S): 90 AEROSOL, METERED ORAL at 11:04

## 2022-12-27 RX ADMIN — Medication 40 MILLIGRAM(S): at 06:26

## 2022-12-27 RX ADMIN — Medication 0.25 MILLIGRAM(S): at 00:26

## 2022-12-27 RX ADMIN — AMIODARONE HYDROCHLORIDE 200 MILLIGRAM(S): 400 TABLET ORAL at 06:26

## 2022-12-27 RX ADMIN — Medication 100 MILLIGRAM(S): at 21:35

## 2022-12-27 RX ADMIN — Medication 100 MILLIGRAM(S): at 14:06

## 2022-12-27 RX ADMIN — Medication 12.5 MILLIGRAM(S): at 11:05

## 2022-12-27 RX ADMIN — Medication 40 MILLIGRAM(S): at 14:05

## 2022-12-27 RX ADMIN — ATORVASTATIN CALCIUM 20 MILLIGRAM(S): 80 TABLET, FILM COATED ORAL at 21:35

## 2022-12-27 RX ADMIN — Medication 100 MILLIGRAM(S): at 06:27

## 2022-12-27 NOTE — PROGRESS NOTE ADULT - ASSESSMENT
89yo F w/ PMHx of HTN, HLD, Hypothyroidism, TIA, s/p AVR, AFib (on Eliquis, s/p ablation > 10yrs ago), CHF (per outpatient bedside echo EF appears reduced) who is admitted to [facility] with CHF exacerbation. General surgery was consulted for fecaluria and abd Xray findings with possible obstruction of small bowel. Patient is hypotensive and tachycardic - possibly attributed to afib vs infectious etiology. WBC 15, Hb stable (11.3), lactate 1.2. Physical exam concerning for soto with fecaluria as well as abdominal distention with tenderness in LLQ/epigastric region. Differential includes SBO, although unlikely given patient is not clinically obstructed vs diverticulitis with colovesical fistula.     Plan:    - No acute surgical intervention indicated at this time. Continue fluid resuscitation per primary  - F/u CT final read  - Please repeat UA given prior UA could have been contaminated by fecal incontinence   - Surgery Team 4C will continue to follow. Please page Team 4 with questions/clinical changes. [phone]   89yo F w/ PMHx of HTN, HLD, Hypothyroidism, TIA, s/p AVR, AFib (on Eliquis, s/p ablation > 10yrs ago), CHF (per outpatient bedside echo EF appears reduced) who is admitted to [facility] with CHF exacerbation. General surgery was consulted for fecaluria and abd Xray findings with possible obstruction of small bowel. Patient is hypotensive and tachycardic - possibly attributed to afib vs infectious etiology. WBC 15, Hb stable (11.3), lactate 1.2. Physical exam concerning for soto with fecaluria as well as abdominal distention with tenderness in LLQ/epigastric region. Differential includes SBO, although unlikely given patient is not clinically obstructed vs diverticulitis with colovesical fistula.     Plan:    - No acute surgical intervention indicated at this time. Continue fluid resuscitation per primary  - F/u CT final read  - Surgery Team 4C will continue to follow. Please page Team 4 with questions/clinical changes. [phone]

## 2022-12-27 NOTE — PROGRESS NOTE ADULT - PROBLEM SELECTOR PLAN 5
h/o bioprosthetic AVR  -TTE 12/21 revealing functioning bioprosthetic AV w/ mild AR, mod MR and mod-severe TR  -Continue w/ above diuresis

## 2022-12-27 NOTE — PROGRESS NOTE ADULT - PROBLEM SELECTOR PLAN 7
H/H remaining stable ~ 10-11s, no active signs of bleeding  -Iron studies c/w CARRILLO  -Pt initiated on IV iron, however now held i/s/o active infection  -Start PO Iron supplementation on d/c

## 2022-12-27 NOTE — PROGRESS NOTE ADULT - PROBLEM SELECTOR PLAN 1
patient remains fluid overloaded, lungs w/ crackles, 2+ pitting edema in BLE, HD stable, SpO2 94% 2L NC  -TTE 12/21/22: LVEF 55-60%, mod-severe LVH, ESTER, bioprosthetic aortic valve w/ mild AR (peak 3.08, mean 22), mod MR, mod-severe TR, PASP 79mmHg,   -Repeat CXR 12/25 revealing improvement of vasc congestion  -net neg 1L/24H, c/w Lasix 40mg IV BID   -Holding home Valsartan and BB 2/2 labile SBP  -Wean off O2 as tolerated  -Core measure, daily weight, strict I&Os and 1L fluid restriction

## 2022-12-27 NOTE — PROGRESS NOTE ADULT - ASSESSMENT
89-year-old female with a PMHx of HTN, HLD, hypothyroidism, TIA, history of AVR, Afib (s/p remote ablation, on Eliquis) and CHF who presented with acute of chronic decompensated heart failure and Afib with RVR.         #Congestive Heart Failure     -further management as per primary team      -TTE (12/21): EF 55-60%, moderate to severe LVH, moderate to severe TR and pHTN    -further diuresis as per primary team, currently on IV lasix 40mg BID    -currently on metoprolol tartrate 12.5mg BID   -valsartan held 2/2 labile BPs as per primary team    -strict I/Os and fluid restriction        #Atrial Fibrillation with RVR     -further management as per primary team and EP    -plan for DCCV today   -continue with Eliquis 5mg BID   -currently on amiodarone 200mg daily and metoprolol tartrate 12.5mg BID     #UTI   #Urinary Retention   -patient has been afebrile with improved leukocytosis    -UCx: E. coli and Proteus (both sensitive to CTX) but concern for contamination, f/u repeat UCx  -continue with CTX 1g daily, duration to be determined once repeat UCx results    -plan for TOV following procedures and diuresis, consider urology consult if fails      #Concern for Entero-Vesicular Fistula   -CT-A/P without evidence of fistula (f/u final read)  -ACS consulted, appreciate recommendations, no surgical intervention indicated at this time     #Dilated Common Bile Duct and Intrahepatic Biliary Ductal Dilation    #Elevated Bilirubin (resolved)  -incidental finding on CT-A/P, also seen on RUQ US   -recommend GI consult +/- MRCP pending GI recommendations     #Hypothyroidism      -continue with levothyroxine 100mcg daily     -TSH and T4 within normal limits        #Microcytic Anemia      -iron studies most consistent with CARRILLO    -continue with ferrous sulfate 325mg daily     #Hyponatremia      -mild and asymptomatic, very likely hypervolemic in setting of ADHF       #Pre-Diabetes (A1c 6.4%)     -no history of DMII and is not on any medications     -outpatient PCP follow up       #HTN    -valsartan 40mg daily held due to labile BPs      #HLD     -continue with atorvastatin 20mg daily        #Cirrhosis   -incidental finding on CT-A/P, no current evidence for decompensated liver disease    -recommend GI consult for above, can comment on cirrhosis as well    DVT PPx: Eliquis      Dispo: pending PT evaluation

## 2022-12-27 NOTE — PROGRESS NOTE ADULT - PROBLEM SELECTOR PLAN 8
Elevated TBili 1.8, now resolved  -RUQ US (12/24) - Diffuse dilatation common bile duct up to 1.8 cm and mild intrahepatic duct dilatation, greater than expected for postcholecystectomy.   -CT A/P (12/24) - No evidence of enterovesicular fistula. Question nodular contour of the liver suggestive of cirrhosis. Dilated common bile duct and mild intrahepatic biliary ductal dilatation, slightly more than expected in the postcholecystectomy state.  -Medicine following - Consider GI consult in AM for possible MRCP.

## 2022-12-27 NOTE — PROGRESS NOTE ADULT - PROBLEM SELECTOR PLAN 3
On 12/24 evening pt became lethargic w/ Tmax 100.9; currently non toxic appearing and HD stable  -Leukocytosis downtrending and has remained afebrile  -Blood Cx NGTD  -UA w/ mod LE, WBC >10 and bacteria  -UCx w/ E. Coli and Proteus m., concern for contamination; f/u repeat UCx  -Continue Ceftriaxone 1g IV QD x 7 days (last dose 12/31)  -s/p soto placement for urinary retention    #R/O Colovesical Fistula  -During straight cath and soto placement fecal matter noted however may be 2/2 prior primafit in place and pt w/ diarrhea, per daughter  -Gen Surgery Team 4 consulted for possible colovesicular fistula  -CT A/P (12/24) - No evidence of enterovesicular fistula. Question nodular contour of the liver suggestive of cirrhosis. Dilated common bile duct and mild intrahepatic biliary ductal dilatation, slightly more than expected in the postcholecystectomy state.  -No acute surgical intervention at this time On 12/24 evening pt became lethargic w/ Tmax 100.9; currently non toxic appearing and HD stable  -Leukocytosis downtrending and has remained afebrile  -Blood Cx NGTD  -UA w/ mod LE, WBC >10 and bacteria  -UCx w/ E. Coli and Proteus m., concern for contamination; f/u repeat UCx  -Continue Ceftriaxone 1g IV QD x 7 days (last dose 12/31)  -s/p soto placement for urinary retention    #R/O Colovesical Fistula  -During straight cath and soto placement fecal matter noted however likely 2/2 contamination of prior primafit as pt experiencing diarrhea,  -Gen Surgery Team 4 consulted for possible colovesicular fistula  -CT A/P (12/24) - No evidence of enterovesicular fistula. Question nodular contour of the liver suggestive of cirrhosis. Dilated common bile duct and mild intrahepatic biliary ductal dilatation, slightly more than expected in the postcholecystectomy state.  -No acute surgical intervention at this time

## 2022-12-27 NOTE — PROGRESS NOTE ADULT - SUBJECTIVE AND OBJECTIVE BOX
SUBJECTIVE: Patient seen and examined bedside; no events overnight, HD stable, CT prelim read with no obstruction, sleepy, mildy arousable during exam, unable to fully participate.    aMIOdarone    Tablet 200 milliGRAM(s) Oral daily  apixaban 5 milliGRAM(s) Oral every 12 hours  cefTRIAXone   IVPB      cefTRIAXone   IVPB 1000 milliGRAM(s) IV Intermittent every 24 hours  furosemide   Injectable 40 milliGRAM(s) IV Push two times a day      Vital Signs Last 24 Hrs  T(C): 36.6 (26 Dec 2022 22:14), Max: 36.6 (26 Dec 2022 22:14)  T(F): 97.9 (26 Dec 2022 22:14), Max: 97.9 (26 Dec 2022 22:14)  HR: 124 (27 Dec 2022 00:23) (97 - 124)  BP: 112/73 (27 Dec 2022 00:23) (92/54 - 118/62)  BP(mean): --  RR: 20 (27 Dec 2022 00:23) (17 - 20)  SpO2: 93% (27 Dec 2022 00:23) (93% - 95%)    Parameters below as of 27 Dec 2022 00:23  Patient On (Oxygen Delivery Method): nasal cannula  O2 Flow (L/min): 2    I&O's Detail    25 Dec 2022 07:01  -  26 Dec 2022 07:00  --------------------------------------------------------  IN:    Oral Fluid: 180 mL  Total IN: 180 mL    OUT:    Indwelling Catheter - Urethral (mL): 2250 mL  Total OUT: 2250 mL    Total NET: -2070 mL      26 Dec 2022 07:01  -  27 Dec 2022 06:16  --------------------------------------------------------  IN:    Heparin Infusion: 198 mL    Oral Fluid: 720 mL  Total IN: 918 mL    OUT:    Indwelling Catheter - Urethral (mL): 1825 mL  Total OUT: 1825 mL    Total NET: -907 mL      PE:    General: NAD, resting comfortably in bed  C/V: S1 s2, RRR  Pulm: Nonlabored breathing, no respiratory distress  Abd: Soft, mildly distended, NT  Extrem: [facility]        LABS:                        11.0   10.52 )-----------( 218      ( 26 Dec 2022 07:30 )             34.9         133<L>  |  94<L>  |  15  ----------------------------<  121<H>  3.8   |  32<H>  |  0.50    Ca    8.6      26 Dec 2022 07:30  Mg     1.9         TPro  6.1  /  Alb  2.8<L>  /  TBili  1.2  /  DBili  x   /  AST  24  /  ALT  21  /  AlkPhos  66      PTT - ( 26 Dec 2022 23:13 )  PTT:72.4 sec  Urinalysis Basic - ( 26 Dec 2022 14:08 )    Color: Yellow / Appearance: Turbid / S.010 / pH: x  Gluc: x / Ketone: NEGATIVE  / Bili: Moderate / Urobili: >=8.0 E.U./dL   Blood: x / Protein: 100 mg/dL / Nitrite: NEGATIVE   Leuk Esterase: Moderate / RBC: Many /HPF / WBC Many /HPF   Sq Epi: x / Non Sq Epi: 0-5 /HPF / Bacteria: Present /HPF        RADIOLOGY & ADDITIONAL STUDIES:     SUBJECTIVE: Patient seen and examined bedside; no events overnight, HD stable, CT prelim read with no obstruction, sleepy, but arousable, passing gas, 3 BMs yesterday, no nausea/vomit.    aMIOdarone    Tablet 200 milliGRAM(s) Oral daily  apixaban 5 milliGRAM(s) Oral every 12 hours  cefTRIAXone   IVPB      cefTRIAXone   IVPB 1000 milliGRAM(s) IV Intermittent every 24 hours  furosemide   Injectable 40 milliGRAM(s) IV Push two times a day      Vital Signs Last 24 Hrs  T(C): 36.6 (26 Dec 2022 22:14), Max: 36.6 (26 Dec 2022 22:14)  T(F): 97.9 (26 Dec 2022 22:14), Max: 97.9 (26 Dec 2022 22:14)  HR: 124 (27 Dec 2022 00:23) (97 - 124)  BP: 112/73 (27 Dec 2022 00:23) (92/54 - 118/62)  BP(mean): --  RR: 20 (27 Dec 2022 00:23) (17 - 20)  SpO2: 93% (27 Dec 2022 00:23) (93% - 95%)    Parameters below as of 27 Dec 2022 00:23  Patient On (Oxygen Delivery Method): nasal cannula  O2 Flow (L/min): 2    I&O's Detail    25 Dec 2022 07:01  -  26 Dec 2022 07:00  --------------------------------------------------------  IN:    Oral Fluid: 180 mL  Total IN: 180 mL    OUT:    Indwelling Catheter - Urethral (mL): 2250 mL  Total OUT: 2250 mL    Total NET: -2070 mL      26 Dec 2022 07:01  -  27 Dec 2022 06:16  --------------------------------------------------------  IN:    Heparin Infusion: 198 mL    Oral Fluid: 720 mL  Total IN: 918 mL    OUT:    Indwelling Catheter - Urethral (mL): 1825 mL  Total OUT: 1825 mL    Total NET: -907 mL      PE:    General: NAD, resting comfortably in bed  C/V: S1 s2, RRR  Pulm: Nonlabored breathing, no respiratory distress  Abd: Soft, mildly distended, NT  Extrem: [facility]        LABS:                        11.0   10.52 )-----------( 218      ( 26 Dec 2022 07:30 )             34.9         133<L>  |  94<L>  |  15  ----------------------------<  121<H>  3.8   |  32<H>  |  0.50    Ca    8.6      26 Dec 2022 07:30  Mg     1.9         TPro  6.1  /  Alb  2.8<L>  /  TBili  1.2  /  DBili  x   /  AST  24  /  ALT  21  /  AlkPhos  66      PTT - ( 26 Dec 2022 23:13 )  PTT:72.4 sec  Urinalysis Basic - ( 26 Dec 2022 14:08 )    Color: Yellow / Appearance: Turbid / S.010 / pH: x  Gluc: x / Ketone: NEGATIVE  / Bili: Moderate / Urobili: >=8.0 E.U./dL   Blood: x / Protein: 100 mg/dL / Nitrite: NEGATIVE   Leuk Esterase: Moderate / RBC: Many /HPF / WBC Many /HPF   Sq Epi: x / Non Sq Epi: 0-5 /HPF / Bacteria: Present /HPF        RADIOLOGY & ADDITIONAL STUDIES:

## 2022-12-27 NOTE — PROGRESS NOTE ADULT - SUBJECTIVE AND OBJECTIVE BOX
Cardiology PA Adult Progress Note    SUBJECTIVE ASSESSMENT: Overnight no acute events; Patient laying in bed, somnolent, refusing to partake in HnP. Denies CP, dizziness, palpitations, SOB, dyspnea, coughing, headaches, N/V/D/abdominal pain. Patient understands plan for the day.   	  MEDICATIONS:  aMIOdarone    Tablet 200 milliGRAM(s) Oral daily  furosemide   Injectable 40 milliGRAM(s) IV Push two times a day  metoprolol tartrate 12.5 milliGRAM(s) Oral two times a day  cefTRIAXone   IVPB      cefTRIAXone   IVPB 1000 milliGRAM(s) IV Intermittent every 24 hours  albuterol   0.5% 2.5 milliGRAM(s) Nebulizer every 6 hours PRN  benzonatate 100 milliGRAM(s) Oral three times a day  ipratropium    for Nebulization 500 MICROGram(s) Nebulizer every 6 hours  ALPRAZolam 0.25 milliGRAM(s) Oral daily PRN  atorvastatin 20 milliGRAM(s) Oral at bedtime  levothyroxine 100 MICROGram(s) Oral daily  apixaban 5 milliGRAM(s) Oral every 12 hours  influenza  Vaccine (HIGH DOSE) 0.7 milliLiter(s) IntraMuscular once  sodium chloride 0.65% Nasal 1 Spray(s) Both Nostrils daily PRN    	  VITAL SIGNS:  T(C): 36.7 (12-27-22 @ 14:25), Max: 36.7 (12-27-22 @ 06:17)  HR: 107 (12-27-22 @ 14:00) (106 - 124)  BP: 111/60 (12-27-22 @ 14:00) (92/54 - 115/54)  RR: 18 (12-27-22 @ 14:00) (18 - 20)  SpO2: 94% (12-27-22 @ 14:00) (93% - 95%)  Wt(kg): --    I&O's Summary    26 Dec 2022 07:01  -  27 Dec 2022 07:00  --------------------------------------------------------  IN: 1047 mL / OUT: 1900 mL / NET: -853 mL    27 Dec 2022 07:01  -  27 Dec 2022 15:50  --------------------------------------------------------  IN: 125 mL / OUT: 1100 mL / NET: -975 mL                                           PHYSICAL EXAM:  Appearance: Normal	  HEENT: Normal oral mucosa, PERRL, EOMI	  Neck: Supple, + JVD/ - JVD; ___ Carotid Bruit   Cardiovascular: Normal S1 S2, No murmurs  Respiratory: Lungs clear to auscultation/Decreased Breath Sounds/No Rales, Rhonchi, Wheezing	  Gastrointestinal:  Soft, Non-tender, + BS	  Skin: No rashes, No ecchymoses, No cyanosis  Extremities: Normal range of motion, No clubbing, cyanosis or edema  Vascular: Peripheral pulses palpable 2+ bilaterally  Neurologic: Non-focal  Psychiatry: A & O x 3, Mood & affect appropriate    LABS:	 	                                  10.4   7.48  )-----------( 220      ( 27 Dec 2022 05:30 )             33.6     12-27    132<L>  |  93<L>  |  15  ----------------------------<  120<H>  4.2   |  32<H>  |  0.49<L>    Ca    8.3<L>      27 Dec 2022 05:30  Mg     2.0     12-27    TPro  6.0  /  Alb  2.7<L>  /  TBili  1.0  /  DBili  x   /  AST  24  /  ALT  20  /  AlkPhos  70  12-27    proBNP:   Lipid Profile:   HgA1c:   TSH:   PTT - ( 26 Dec 2022 23:13 )  PTT:72.4 sec Cardiology PA Adult Progress Note    SUBJECTIVE ASSESSMENT: Overnight no acute events; Patient laying in bed, somnolent, refusing to partake in HnP. Denies CP, dizziness, palpitations, SOB, dyspnea, coughing, headaches, N/V/D/abdominal pain. Patient understands plan for the day.   	  MEDICATIONS:  aMIOdarone    Tablet 200 milliGRAM(s) Oral daily  furosemide   Injectable 40 milliGRAM(s) IV Push two times a day  metoprolol tartrate 12.5 milliGRAM(s) Oral two times a day  cefTRIAXone   IVPB      cefTRIAXone   IVPB 1000 milliGRAM(s) IV Intermittent every 24 hours  albuterol   0.5% 2.5 milliGRAM(s) Nebulizer every 6 hours PRN  benzonatate 100 milliGRAM(s) Oral three times a day  ipratropium    for Nebulization 500 MICROGram(s) Nebulizer every 6 hours  ALPRAZolam 0.25 milliGRAM(s) Oral daily PRN  atorvastatin 20 milliGRAM(s) Oral at bedtime  levothyroxine 100 MICROGram(s) Oral daily  apixaban 5 milliGRAM(s) Oral every 12 hours  influenza  Vaccine (HIGH DOSE) 0.7 milliLiter(s) IntraMuscular once  sodium chloride 0.65% Nasal 1 Spray(s) Both Nostrils daily PRN    	  VITAL SIGNS:  T(C): 36.7 (12-27-22 @ 14:25), Max: 36.7 (12-27-22 @ 06:17)  HR: 107 (12-27-22 @ 14:00) (106 - 124)  BP: 111/60 (12-27-22 @ 14:00) (92/54 - 115/54)  RR: 18 (12-27-22 @ 14:00) (18 - 20)  SpO2: 94% (12-27-22 @ 14:00) (93% - 95%)  Wt(kg): --    I&O's Summary    26 Dec 2022 07:01  -  27 Dec 2022 07:00  --------------------------------------------------------  IN: 1047 mL / OUT: 1900 mL / NET: -853 mL    27 Dec 2022 07:01  -  27 Dec 2022 15:50  --------------------------------------------------------  IN: 125 mL / OUT: 1100 mL / NET: -975 mL                                           PHYSICAL EXAM:  Appearance: Normal	  HEENT: Normal oral mucosa, PERRL, EOMI	  Neck: Supple, + JVD; no Carotid Bruit   Cardiovascular: Normal S1 S2, No murmurs  Respiratory: Diffuse Rales and wheezing throughout lower lung fields	  Gastrointestinal:  Soft, Non-tender, + BS	  Skin: No rashes, No ecchymoses, No cyanosis  Extremities: Normal range of motion, No clubbing, cyanosis or edema  Vascular: Peripheral pulses palpable 2+ bilaterally  Neurologic: Non-focal  Psychiatry: A & O x 3, Mood & affect appropriate    LABS:	 	                                  10.4   7.48  )-----------( 220      ( 27 Dec 2022 05:30 )             33.6     12-27    132<L>  |  93<L>  |  15  ----------------------------<  120<H>  4.2   |  32<H>  |  0.49<L>    Ca    8.3<L>      27 Dec 2022 05:30  Mg     2.0     12-27    TPro  6.0  /  Alb  2.7<L>  /  TBili  1.0  /  DBili  x   /  AST  24  /  ALT  20  /  AlkPhos  70  12-27    proBNP:   Lipid Profile:   HgA1c:   TSH:   PTT - ( 26 Dec 2022 23:13 )  PTT:72.4 sec Cardiology PA Adult Progress Note    SUBJECTIVE ASSESSMENT: Overnight no acute events; Patient laying in bed, somnolent, refusing to partake in HnP. Denies CP, dizziness, palpitations, SOB, dyspnea, coughing, headaches, N/V/D/abdominal pain. Patient understands plan for the day.   	  MEDICATIONS:  aMIOdarone    Tablet 200 milliGRAM(s) Oral daily  furosemide   Injectable 40 milliGRAM(s) IV Push two times a day  metoprolol tartrate 12.5 milliGRAM(s) Oral two times a day  cefTRIAXone   IVPB      cefTRIAXone   IVPB 1000 milliGRAM(s) IV Intermittent every 24 hours  albuterol   0.5% 2.5 milliGRAM(s) Nebulizer every 6 hours PRN  benzonatate 100 milliGRAM(s) Oral three times a day  ipratropium    for Nebulization 500 MICROGram(s) Nebulizer every 6 hours  ALPRAZolam 0.25 milliGRAM(s) Oral daily PRN  atorvastatin 20 milliGRAM(s) Oral at bedtime  levothyroxine 100 MICROGram(s) Oral daily  apixaban 5 milliGRAM(s) Oral every 12 hours  influenza  Vaccine (HIGH DOSE) 0.7 milliLiter(s) IntraMuscular once  sodium chloride 0.65% Nasal 1 Spray(s) Both Nostrils daily PRN    	  VITAL SIGNS:  T(C): 36.7 (12-27-22 @ 14:25), Max: 36.7 (12-27-22 @ 06:17)  HR: 107 (12-27-22 @ 14:00) (106 - 124)  BP: 111/60 (12-27-22 @ 14:00) (92/54 - 115/54)  RR: 18 (12-27-22 @ 14:00) (18 - 20)  SpO2: 94% (12-27-22 @ 14:00) (93% - 95%)  Wt(kg): --    I&O's Summary    26 Dec 2022 07:01  -  27 Dec 2022 07:00  --------------------------------------------------------  IN: 1047 mL / OUT: 1900 mL / NET: -853 mL    27 Dec 2022 07:01  -  27 Dec 2022 15:50  --------------------------------------------------------  IN: 125 mL / OUT: 1100 mL / NET: -975 mL                                           PHYSICAL EXAM:  Appearance: Normal	  HEENT: Normal oral mucosa, PERRL, EOMI	  Neck: Supple, + JVD; no Carotid Bruit   Cardiovascular: Normal S1 S2, No murmurs  Respiratory: Diffuse Rales and wheezing throughout lower lung fields	  Gastrointestinal:  Soft, Non-tender, + BS	  Skin: No rashes, No ecchymoses, No cyanosis  Extremities: 2 pitting edema in BLE; Normal range of motion, No clubbing, cyanosis  Vascular: Peripheral pulses palpable 2+ bilaterally  Neurologic: Non-focal  Psychiatry: A & O x 3, Mood & affect appropriate    LABS:	 	                        10.4   7.48  )-----------( 220      ( 27 Dec 2022 05:30 )             33.6     12-27    132<L>  |  93<L>  |  15  ----------------------------<  120<H>  4.2   |  32<H>  |  0.49<L>    Ca    8.3<L>      27 Dec 2022 05:30  Mg     2.0     12-27    TPro  6.0  /  Alb  2.7<L>  /  TBili  1.0  /  DBili  x   /  AST  24  /  ALT  20  /  AlkPhos  70  12-27    proBNP: 5591 (12/24)  Lipid Profile: TChol 125, TG 68, LDL 52, HDL 59  HgA1c: 6.4  TSH: 2.83  PTT - ( 26 Dec 2022 23:13 )  PTT:72.4 sec

## 2022-12-27 NOTE — PROGRESS NOTE ADULT - PROBLEM SELECTOR PLAN 4
labile SBP on 12/25 (80-100s), currently stable  -Continue Lasix 40mg IV BID and transition to PO in AM  -Holding home Valsartan and Atenolol 2/2 labile BP, restart as tolerated labile SBP on 12/25 (80-100s), currently stable  -Continue Lasix 40mg IV BID and transition to PO in AM  -started patient on lopressor 12.5 mg BID  -Holding home Valsartan and Atenolol 2/2 labile BP, restart as tolerated

## 2022-12-27 NOTE — PROGRESS NOTE ADULT - SUBJECTIVE AND OBJECTIVE BOX
Subjective:  No acute events overnight.  Patient is doing well today without new complaints.  Denies HA, CP, SOB, abdominal pain, nausea, vomiting, fever, chills or diarrhea.  Last BM yesterday.     Objective:   Vital Signs:  T(C): 36.7 (27 Dec 2022 14:25), Max: 36.7 (27 Dec 2022 06:17)  T(F): 98.1 (27 Dec 2022 14:25), Max: 98.1 (27 Dec 2022 06:17)  HR: 107 (27 Dec 2022 14:00) (106 - 124)  BP: 111/60 (27 Dec 2022 14:00) (92/54 - 118/62)  BP(mean): 74 (27 Dec 2022 11:00) (74 - 74)  RR: 18 (27 Dec 2022 14:00) (17 - 20)  SpO2: 94% (27 Dec 2022 14:00) (93% - 95%)    Parameters below as of 27 Dec 2022 14:00  Patient On (Oxygen Delivery Method): nasal cannula  O2 Flow (L/min): 2    Physical Exam:   -Gen: NAD, resting in bed, pleasant and cooperative  -HEENT: EOMI, PERRL, no scleral icterus, no JVD, no bruits  -CV: normal S1 and S2, no murmurs appreciated   -Lungs: CTABL, normal respiratory effort on NC  -Ab: soft, NT, ND, normal BS  -: soto in place with orange/reddish urine   -Ext: no LE edema, no rashes  -Neuro: A&O x 3, CN 2-12 intact, no focal deficits     Labs:                        10.4   7.48  )-----------( 220      ( 27 Dec 2022 05:30 )             33.6       12-27    132<L>  |  93<L>  |  15  ----------------------------<  120<H>  4.2   |  32<H>  |  0.49<L>    Ca    8.3<L>      27 Dec 2022 05:30  Mg     2.0     12-27    TPro  6.0  /  Alb  2.7<L>  /  TBili  1.0  /  DBili  x   /  AST  24  /  ALT  20  /  AlkPhos  70  12-27    Microbiology:     Culture - Blood (collected 12-24-22 @ 20:32)  Source: .Blood Blood-Peripheral  Preliminary Report (12-26-22 @ 21:00):    No growth at 2 days.    Culture - Blood (collected 12-24-22 @ 20:32)  Source: .Blood Blood-Venous  Preliminary Report (12-26-22 @ 21:00):    No growth at 2 days.     Medications:  MEDICATIONS  (STANDING):  aMIOdarone    Tablet 200 milliGRAM(s) Oral daily  apixaban 5 milliGRAM(s) Oral every 12 hours  atorvastatin 20 milliGRAM(s) Oral at bedtime  benzonatate 100 milliGRAM(s) Oral three times a day  cefTRIAXone   IVPB      cefTRIAXone   IVPB 1000 milliGRAM(s) IV Intermittent every 24 hours  furosemide   Injectable 40 milliGRAM(s) IV Push two times a day  influenza  Vaccine (HIGH DOSE) 0.7 milliLiter(s) IntraMuscular once  ipratropium    for Nebulization 500 MICROGram(s) Nebulizer every 6 hours  levothyroxine 100 MICROGram(s) Oral daily  metoprolol tartrate 12.5 milliGRAM(s) Oral two times a day    MEDICATIONS  (PRN):  albuterol   0.5% 2.5 milliGRAM(s) Nebulizer every 6 hours PRN Shortness of Breath and/or Wheezing  ALPRAZolam 0.25 milliGRAM(s) Oral daily PRN Anxiety  sodium chloride 0.65% Nasal 1 Spray(s) Both Nostrils daily PRN Nasal Congestion

## 2022-12-27 NOTE — PROGRESS NOTE ADULT - ASSESSMENT
89F w/ PMHx HTN, HLD, AF on Eliquis (remote h/o ablation >10yrs ago), HFpEF, s/p bioprosthetic AVR, Hypothyroidism and TIA, admitted to cardiac tele for further management of acute decompensated CHF and AF w/ RVR. EP following, pending DCCV 12/27 w/ possible AVN ablation w/ PPM placement during this hospitalization. Hospital course c/b lethargy and fever on 12/24, pt found to have a UTI as well as fecal material seen in urine during soto placement; evaluated by gen surg and r/o colovesicular fistula. Pt currently on IV abx x 7 days

## 2022-12-27 NOTE — PROGRESS NOTE ADULT - PROBLEM SELECTOR PLAN 2
presented in AF w/ RVR, currently rate 110-120s, s/p DCCV  ____________________  -If unsuccessful will consider AVN ablation w/ PPM placement this hospitalization  -Initiated on Amio 200mg TID however on 12/24 HR uncontrolled and now s/p Digoxin 500mcg IV x 1 and s/p Amio gtt x 24hrs  -Continue Amio 200mg QD  -Restart eliquis after DCCV  -Holding Metoprolol 2/2 labile SBP presented in AF w/ RVR, currently rate 110-120s, s/p DCCV  ____________________  -If unsuccessful will consider AVN ablation w/ PPM placement this hospitalization  -Initiated on Amio 200mg TID however on 12/24 HR uncontrolled and now s/p Digoxin 500mcg IV x 1 and s/p Amio gtt x 24hrs  -Continue Amio 200mg QD  -Continue eliquis after DCCV  -Holding Metoprolol 2/2 labile SBP presented in AF w/ RVR, currently rate 110-120s, NPO for DCCV 12/27/2022  -If unsuccessful will consider AVN ablation w/ PPM placement this hospitalization  -Initiated on Amio 200mg TID however on 12/24 HR uncontrolled and now s/p Digoxin 500mcg IV x 1 and s/p Amio gtt x 24hrs  -Continue Amio 200mg QD  -Continue eliquis after DCCV  -Restarted patient on lopressor 12.5 mg BID

## 2022-12-28 LAB
ALBUMIN SERPL ELPH-MCNC: 3.3 G/DL — SIGNIFICANT CHANGE UP (ref 3.3–5)
ALP SERPL-CCNC: 72 U/L — SIGNIFICANT CHANGE UP (ref 40–120)
ALT FLD-CCNC: 30 U/L — SIGNIFICANT CHANGE UP (ref 10–45)
ANION GAP SERPL CALC-SCNC: 10 MMOL/L — SIGNIFICANT CHANGE UP (ref 5–17)
ANISOCYTOSIS BLD QL: SLIGHT — SIGNIFICANT CHANGE UP
AST SERPL-CCNC: 38 U/L — SIGNIFICANT CHANGE UP (ref 10–40)
BASOPHILS # BLD AUTO: 0 K/UL — SIGNIFICANT CHANGE UP (ref 0–0.2)
BASOPHILS NFR BLD AUTO: 0 % — SIGNIFICANT CHANGE UP (ref 0–2)
BILIRUB SERPL-MCNC: 1.1 MG/DL — SIGNIFICANT CHANGE UP (ref 0.2–1.2)
BUN SERPL-MCNC: 11 MG/DL — SIGNIFICANT CHANGE UP (ref 7–23)
BURR CELLS BLD QL SMEAR: PRESENT — SIGNIFICANT CHANGE UP
CALCIUM SERPL-MCNC: 8.4 MG/DL — SIGNIFICANT CHANGE UP (ref 8.4–10.5)
CHLORIDE SERPL-SCNC: 92 MMOL/L — LOW (ref 96–108)
CO2 SERPL-SCNC: 32 MMOL/L — HIGH (ref 22–31)
CREAT SERPL-MCNC: 0.47 MG/DL — LOW (ref 0.5–1.3)
EGFR: 88 ML/MIN/1.73M2 — SIGNIFICANT CHANGE UP
EOSINOPHIL # BLD AUTO: 0.06 K/UL — SIGNIFICANT CHANGE UP (ref 0–0.5)
EOSINOPHIL NFR BLD AUTO: 0.9 % — SIGNIFICANT CHANGE UP (ref 0–6)
GLUCOSE SERPL-MCNC: 238 MG/DL — HIGH (ref 70–99)
HCT VFR BLD CALC: 36.5 % — SIGNIFICANT CHANGE UP (ref 34.5–45)
HGB BLD-MCNC: 11.3 G/DL — LOW (ref 11.5–15.5)
HYPOCHROMIA BLD QL: SIGNIFICANT CHANGE UP
LYMPHOCYTES # BLD AUTO: 0.55 K/UL — LOW (ref 1–3.3)
LYMPHOCYTES # BLD AUTO: 8.7 % — LOW (ref 13–44)
MAGNESIUM SERPL-MCNC: 2 MG/DL — SIGNIFICANT CHANGE UP (ref 1.6–2.6)
MANUAL SMEAR VERIFICATION: SIGNIFICANT CHANGE UP
MCHC RBC-ENTMCNC: 24.7 PG — LOW (ref 27–34)
MCHC RBC-ENTMCNC: 31 GM/DL — LOW (ref 32–36)
MCV RBC AUTO: 79.7 FL — LOW (ref 80–100)
MICROCYTES BLD QL: SLIGHT — SIGNIFICANT CHANGE UP
MONOCYTES # BLD AUTO: 0.49 K/UL — SIGNIFICANT CHANGE UP (ref 0–0.9)
MONOCYTES NFR BLD AUTO: 7.8 % — SIGNIFICANT CHANGE UP (ref 2–14)
NEUTROPHILS # BLD AUTO: 5.22 K/UL — SIGNIFICANT CHANGE UP (ref 1.8–7.4)
NEUTROPHILS NFR BLD AUTO: 82.6 % — HIGH (ref 43–77)
OVALOCYTES BLD QL SMEAR: SLIGHT — SIGNIFICANT CHANGE UP
PLAT MORPH BLD: NORMAL — SIGNIFICANT CHANGE UP
PLATELET # BLD AUTO: 228 K/UL — SIGNIFICANT CHANGE UP (ref 150–400)
POIKILOCYTOSIS BLD QL AUTO: SIGNIFICANT CHANGE UP
POLYCHROMASIA BLD QL SMEAR: SLIGHT — SIGNIFICANT CHANGE UP
POTASSIUM SERPL-MCNC: 3.7 MMOL/L — SIGNIFICANT CHANGE UP (ref 3.5–5.3)
POTASSIUM SERPL-SCNC: 3.7 MMOL/L — SIGNIFICANT CHANGE UP (ref 3.5–5.3)
PROT SERPL-MCNC: 6.6 G/DL — SIGNIFICANT CHANGE UP (ref 6–8.3)
RBC # BLD: 4.58 M/UL — SIGNIFICANT CHANGE UP (ref 3.8–5.2)
RBC # FLD: 18 % — HIGH (ref 10.3–14.5)
RBC BLD AUTO: ABNORMAL
SCHISTOCYTES BLD QL AUTO: SLIGHT — SIGNIFICANT CHANGE UP
SICKLE CELLS BLD QL SMEAR: SLIGHT — SIGNIFICANT CHANGE UP
SMUDGE CELLS # BLD: PRESENT — SIGNIFICANT CHANGE UP
SODIUM SERPL-SCNC: 134 MMOL/L — LOW (ref 135–145)
WBC # BLD: 6.32 K/UL — SIGNIFICANT CHANGE UP (ref 3.8–10.5)
WBC # FLD AUTO: 6.32 K/UL — SIGNIFICANT CHANGE UP (ref 3.8–10.5)

## 2022-12-28 PROCEDURE — 99233 SBSQ HOSP IP/OBS HIGH 50: CPT

## 2022-12-28 PROCEDURE — 99222 1ST HOSP IP/OBS MODERATE 55: CPT

## 2022-12-28 PROCEDURE — 71045 X-RAY EXAM CHEST 1 VIEW: CPT | Mod: 26

## 2022-12-28 RX ORDER — SIMETHICONE 80 MG/1
80 TABLET, CHEWABLE ORAL ONCE
Refills: 0 | Status: COMPLETED | OUTPATIENT
Start: 2022-12-28 | End: 2022-12-29

## 2022-12-28 RX ORDER — POTASSIUM CHLORIDE 20 MEQ
40 PACKET (EA) ORAL ONCE
Refills: 0 | Status: COMPLETED | OUTPATIENT
Start: 2022-12-28 | End: 2022-12-28

## 2022-12-28 RX ORDER — APIXABAN 2.5 MG/1
5 TABLET, FILM COATED ORAL
Refills: 0 | Status: DISCONTINUED | OUTPATIENT
Start: 2022-12-28 | End: 2023-01-03

## 2022-12-28 RX ORDER — BENZOCAINE AND MENTHOL 5; 1 G/100ML; G/100ML
1 LIQUID ORAL ONCE
Refills: 0 | Status: COMPLETED | OUTPATIENT
Start: 2022-12-28 | End: 2022-12-28

## 2022-12-28 RX ORDER — SIMETHICONE 80 MG/1
80 TABLET, CHEWABLE ORAL ONCE
Refills: 0 | Status: COMPLETED | OUTPATIENT
Start: 2022-12-28 | End: 2022-12-28

## 2022-12-28 RX ORDER — METOPROLOL TARTRATE 50 MG
25 TABLET ORAL DAILY
Refills: 0 | Status: DISCONTINUED | OUTPATIENT
Start: 2022-12-28 | End: 2023-01-03

## 2022-12-28 RX ORDER — ACETAZOLAMIDE 250 MG/1
500 TABLET ORAL ONCE
Refills: 0 | Status: COMPLETED | OUTPATIENT
Start: 2022-12-29 | End: 2022-12-29

## 2022-12-28 RX ORDER — SPIRONOLACTONE 25 MG/1
25 TABLET, FILM COATED ORAL DAILY
Refills: 0 | Status: DISCONTINUED | OUTPATIENT
Start: 2022-12-28 | End: 2022-12-29

## 2022-12-28 RX ADMIN — Medication 500 MICROGRAM(S): at 03:30

## 2022-12-28 RX ADMIN — SPIRONOLACTONE 25 MILLIGRAM(S): 25 TABLET, FILM COATED ORAL at 17:45

## 2022-12-28 RX ADMIN — Medication 40 MILLIGRAM(S): at 06:15

## 2022-12-28 RX ADMIN — CEFTRIAXONE 100 MILLIGRAM(S): 500 INJECTION, POWDER, FOR SOLUTION INTRAMUSCULAR; INTRAVENOUS at 11:23

## 2022-12-28 RX ADMIN — Medication 500 MICROGRAM(S): at 15:03

## 2022-12-28 RX ADMIN — Medication 100 MILLIGRAM(S): at 13:04

## 2022-12-28 RX ADMIN — Medication 25 MILLIGRAM(S): at 15:04

## 2022-12-28 RX ADMIN — BENZOCAINE AND MENTHOL 1 LOZENGE: 5; 1 LIQUID ORAL at 19:00

## 2022-12-28 RX ADMIN — HEPARIN SODIUM 900 UNIT(S)/HR: 5000 INJECTION INTRAVENOUS; SUBCUTANEOUS at 08:34

## 2022-12-28 RX ADMIN — Medication 100 MICROGRAM(S): at 06:12

## 2022-12-28 RX ADMIN — Medication 40 MILLIEQUIVALENT(S): at 15:04

## 2022-12-28 RX ADMIN — APIXABAN 5 MILLIGRAM(S): 2.5 TABLET, FILM COATED ORAL at 17:06

## 2022-12-28 RX ADMIN — Medication 40 MILLIGRAM(S): at 13:05

## 2022-12-28 RX ADMIN — Medication 500 MICROGRAM(S): at 09:26

## 2022-12-28 RX ADMIN — AMIODARONE HYDROCHLORIDE 200 MILLIGRAM(S): 400 TABLET ORAL at 06:12

## 2022-12-28 RX ADMIN — Medication 0.25 MILLIGRAM(S): at 00:41

## 2022-12-28 RX ADMIN — HEPARIN SODIUM 900 UNIT(S)/HR: 5000 INJECTION INTRAVENOUS; SUBCUTANEOUS at 06:11

## 2022-12-28 RX ADMIN — Medication 600 MILLIGRAM(S): at 19:00

## 2022-12-28 RX ADMIN — Medication 100 MILLIGRAM(S): at 06:12

## 2022-12-28 RX ADMIN — Medication 12.5 MILLIGRAM(S): at 09:26

## 2022-12-28 NOTE — CONSULT NOTE ADULT - ASSESSMENT
89yo F , PMHx HTN, HLD, Hypothyroidism, TIA, s/p Aortic VR, AFib (on Eliquis, s/p remote ablation) CHF/HFrEF (per outpatient bedside echo EF appears reduced)   pw worsening LE edema x 2 months.   treated for CHF, Afib with DVVC, CT obtained in workup of ruling out colo-vesicular fistula, which showed dilated CBD, for which GI was consulted    Recommendations:  Please obtain records of prior Abdominal Imaging for comparison   No need to pursue MRCP in absence of clinical symptoms / elevated LT's  Outpatient GI follow up     Thank you for allowing us to participate in the care of this patient. GI will sign off the case.  Please call us if you have any further questions or concerns    Brendan Swan M.D.  Gastroenterology Fellow  Weekday Pager: [phone]  Weeknights/Weekend Coverage: Please Call the  for contact info

## 2022-12-28 NOTE — PROGRESS NOTE ADULT - PROBLEM SELECTOR PLAN 8
Elevated TBili 1.8, now resolved  -RUQ US (12/24) - Diffuse dilatation common bile duct up to 1.8 cm and mild intrahepatic duct dilatation, greater than expected for postcholecystectomy.   -CT A/P (12/24) - No evidence of enterovesicular fistula. Question nodular contour of the liver suggestive of cirrhosis. Dilated common bile duct and mild intrahepatic biliary ductal dilatation, slightly more than expected in the postcholecystectomy state.  -Medicine following - Consider GI consult in AM for possible MRCP. Elevated TBili 1.8, now resolved  -RUQ US (12/24) - Diffuse dilatation common bile duct up to 1.8 cm and mild intrahepatic duct dilatation, greater than expected for postcholecystectomy.   -CT A/P (12/24) - No evidence of enterovesicular fistula. Question nodular contour of the liver suggestive of cirrhosis. Dilated common bile duct and mild intrahepatic biliary ductal dilatation, slightly more than expected in the postcholecystectomy state.  -GI following - No need to pursue MRCP in absence of clinical symptoms/resolved elevated LT's, can follow up with GI outpatient

## 2022-12-28 NOTE — CONSULT NOTE ADULT - SUBJECTIVE AND OBJECTIVE BOX
GASTROENTEROLOGY CONSULT NOTE  HPI: 89yo F , PMHx HTN, HLD, Hypothyroidism, TIA, s/p Aortic VR, AFib (on Eliquis, s/p remote ablation) CHF/HFrEF (per outpatient bedside echo EF appears reduced)   pw worsening LE edema x 2 months.   Outpatient cardiologist, Dr. David, sent her to [facility], at which time a bedside limited echo was done showing a possibly reduced LVEF. Pt found to have volume overload peripherally and in AFib w/ RVR (HR 100s).     Since admission, patient underwent DCCV, and avoided pacemaker placement, was treated for HF exacerbation    VITALS: HR 100s, -120/66-90, SpO2 98% on RA, RR 18, T 97.5F.   LABS: WBC 6.95, Hgb/Hct 11.5/36.3, Plt Cnt 196, Na 132, K 4.5, BUN/Cr 13/0.61, Trop T 0.01, CK 91, CKMB 39, BNP 3126.     TREATMENT IN ED: Lasix 40mg IV x1.     Patient admitted to cardiology for heart failure exacerbation and atrial fibrillation.  (20 Dec 2022 18:00)    Allergies    No Known Allergies    Intolerances      Home Medications:  atenolol 25 mg oral tablet: 1 tab(s) orally once a day (20 Dec 2022 19:21)  atorvastatin 20 mg oral tablet: 1 tab(s) orally once a day (20 Dec 2022 19:21)  Eliquis 5 mg oral tablet: 1 tab(s) orally 2 times a day (20 Dec 2022 19:21)  furosemide 20 mg oral tablet: 1 tab(s) orally every 12 hours (20 Dec 2022 19:21)  Klor-Con 10 oral tablet, extended release: 1 tab(s) orally once a day (20 Dec 2022 19:21)  levothyroxine 100 mcg (0.1 mg) oral tablet: 1 tab(s) orally once a day (20 Dec 2022 19:21)  meclizine 12.5 mg oral tablet: 1 tab(s) orally 3 times a day, As Needed (20 Dec 2022 19:21)  valsartan 40 mg oral tablet: 1 tab(s) orally once a day (20 Dec 2022 19:21)  Xanax 0.25 mg oral tablet: 1 tab(s) orally every 6 hours, As Needed (20 Dec 2022 19:21)    MEDICATIONS:  MEDICATIONS  (STANDING):  aMIOdarone    Tablet 200 milliGRAM(s) Oral daily  apixaban 5 milliGRAM(s) Oral two times a day  atorvastatin 20 milliGRAM(s) Oral at bedtime  benzonatate 100 milliGRAM(s) Oral three times a day  cefTRIAXone   IVPB      cefTRIAXone   IVPB 1000 milliGRAM(s) IV Intermittent every 24 hours  furosemide   Injectable 40 milliGRAM(s) IV Push two times a day  influenza  Vaccine (HIGH DOSE) 0.7 milliLiter(s) IntraMuscular once  ipratropium    for Nebulization 500 MICROGram(s) Nebulizer every 6 hours  levothyroxine 100 MICROGram(s) Oral daily  metoprolol succinate ER 25 milliGRAM(s) Oral daily    MEDICATIONS  (PRN):  albuterol   0.5% 2.5 milliGRAM(s) Nebulizer every 6 hours PRN Shortness of Breath and/or Wheezing  ALPRAZolam 0.25 milliGRAM(s) Oral daily PRN Anxiety  sodium chloride 0.65% Nasal 1 Spray(s) Both Nostrils daily PRN Nasal Congestion    PAST MEDICAL & SURGICAL HISTORY:  HTN (hypertension)      HLD (hyperlipidemia)      Congestive heart failure (CHF)      TIA (transient ischemic attack)      Hypothyroidism      Atrial fibrillation        FAMILY HISTORY:    SOCIAL HISTORY:  Tobacco: [ ] Current, [ ] Former, [ ] Never; Pack Years:  Alcohol:  Illicit Drugs:    REVIEW OF SYSTEMS:  All other 10 review of systems is negative unless indicated above.    Vital Signs Last 24 Hrs  T(C): 37.1 (28 Dec 2022 13:30), Max: 37.1 (28 Dec 2022 13:30)  T(F): 98.8 (28 Dec 2022 13:30), Max: 98.8 (28 Dec 2022 13:30)  HR: 98 (28 Dec 2022 15:02) (87 - 100)  BP: 112/55 (28 Dec 2022 15:02) (111/57 - 148/73)  BP(mean): 79 (28 Dec 2022 15:02) (79 - 88)  RR: 18 (28 Dec 2022 15:02) (17 - 18)  SpO2: 98% (28 Dec 2022 15:02) (92% - 98%)    Parameters below as of 28 Dec 2022 15:02  Patient On (Oxygen Delivery Method): nasal cannula  O2 Flow (L/min): 2      12-27 @ 07:01  -  12-28 @ 07:00  --------------------------------------------------------  IN: 443 mL / OUT: 3525 mL / NET: -3082 mL    12-28 @ 07:01  -  12-28 @ 16:27  --------------------------------------------------------  IN: 377 mL / OUT: 875 mL / NET: -498 mL        PHYSICAL EXAM:    General: lying in bed, in no acute distress  HEENT: Neck supple, mmm, no jvd  Lungs: Normal respiratory effort, no intercostal retractions  Cardiovascular: regular rate  Abdomen: Soft, non-tender non-distended; No rebound or guarding  Extremities: wwp, no cce  Neurological: ABRAHAM, speech fluent  Skin: Warm and dry. No obvious rash    LABS:                        11.3   6.32  )-----------( 228      ( 28 Dec 2022 05:30 )             36.5     12-28    134<L>  |  92<L>  |  11  ----------------------------<  238<H>  3.7   |  32<H>  |  0.47<L>    Ca    8.4      28 Dec 2022 05:30  Mg     2.0     12-28    TPro  6.6  /  Alb  3.3  /  TBili  1.1  /  DBili  x   /  AST  38  /  ALT  30  /  AlkPhos  72  12-28      Culture Results:   Culture in progress (12-27 @ 17:45)    PTT - ( 27 Dec 2022 22:56 )  PTT:29.7 sec    Culture - Urine (collected 27 Dec 2022 17:45)  Source: Catheterized Catheterized  Preliminary Report (28 Dec 2022 09:21):    Culture in progress      RADIOLOGY & ADDITIONAL STUDIES:     Reviewed   GASTROENTEROLOGY CONSULT NOTE  HPI: 89yo F , PMHx HTN, HLD, Hypothyroidism, TIA, s/p Aortic VR, AFib (on Eliquis, s/p remote ablation) CHF/HFrEF (per outpatient bedside echo EF appears reduced)   pw worsening LE edema x 2 months.   Outpatient cardiologist, Dr. David, sent her to [facility], at which time a bedside limited echo was done showing a possibly reduced LVEF. Pt found to have volume overload peripherally and in AFib w/ RVR (HR 100s).     Since admission, patient underwent DCCV, and avoided pacemaker placement, was treated for HF exacerbation    Douglas placed for urinary retention, apparent fecal material drained, prompting Surgical consult, leading to CT scan, with findings of Dilated CBD post adele (2014), prompting GI consultation    Patient has no abd pain or post prandial complaints ;    Allergies    No Known Allergies    Intolerances      Home Medications:  atenolol 25 mg oral tablet: 1 tab(s) orally once a day (20 Dec 2022 19:21)  atorvastatin 20 mg oral tablet: 1 tab(s) orally once a day (20 Dec 2022 19:21)  Eliquis 5 mg oral tablet: 1 tab(s) orally 2 times a day (20 Dec 2022 19:21)  furosemide 20 mg oral tablet: 1 tab(s) orally every 12 hours (20 Dec 2022 19:21)  Klor-Con 10 oral tablet, extended release: 1 tab(s) orally once a day (20 Dec 2022 19:21)  levothyroxine 100 mcg (0.1 mg) oral tablet: 1 tab(s) orally once a day (20 Dec 2022 19:21)  meclizine 12.5 mg oral tablet: 1 tab(s) orally 3 times a day, As Needed (20 Dec 2022 19:21)  valsartan 40 mg oral tablet: 1 tab(s) orally once a day (20 Dec 2022 19:21)  Xanax 0.25 mg oral tablet: 1 tab(s) orally every 6 hours, As Needed (20 Dec 2022 19:21)    MEDICATIONS:  MEDICATIONS  (STANDING):  aMIOdarone    Tablet 200 milliGRAM(s) Oral daily  apixaban 5 milliGRAM(s) Oral two times a day  atorvastatin 20 milliGRAM(s) Oral at bedtime  benzonatate 100 milliGRAM(s) Oral three times a day  cefTRIAXone   IVPB      cefTRIAXone   IVPB 1000 milliGRAM(s) IV Intermittent every 24 hours  furosemide   Injectable 40 milliGRAM(s) IV Push two times a day  influenza  Vaccine (HIGH DOSE) 0.7 milliLiter(s) IntraMuscular once  ipratropium    for Nebulization 500 MICROGram(s) Nebulizer every 6 hours  levothyroxine 100 MICROGram(s) Oral daily  metoprolol succinate ER 25 milliGRAM(s) Oral daily    MEDICATIONS  (PRN):  albuterol   0.5% 2.5 milliGRAM(s) Nebulizer every 6 hours PRN Shortness of Breath and/or Wheezing  ALPRAZolam 0.25 milliGRAM(s) Oral daily PRN Anxiety  sodium chloride 0.65% Nasal 1 Spray(s) Both Nostrils daily PRN Nasal Congestion    PAST MEDICAL & SURGICAL HISTORY:  HTN (hypertension)      HLD (hyperlipidemia)      Congestive heart failure (CHF)      TIA (transient ischemic attack)      Hypothyroidism      Atrial fibrillation        FAMILY HISTORY: htn    REVIEW OF SYSTEMS:  All other 10 review of systems is negative unless indicated above.    Vital Signs Last 24 Hrs  T(C): 37.1 (28 Dec 2022 13:30), Max: 37.1 (28 Dec 2022 13:30)  T(F): 98.8 (28 Dec 2022 13:30), Max: 98.8 (28 Dec 2022 13:30)  HR: 98 (28 Dec 2022 15:02) (87 - 100)  BP: 112/55 (28 Dec 2022 15:02) (111/57 - 148/73)  BP(mean): 79 (28 Dec 2022 15:02) (79 - 88)  RR: 18 (28 Dec 2022 15:02) (17 - 18)  SpO2: 98% (28 Dec 2022 15:02) (92% - 98%)    Parameters below as of 28 Dec 2022 15:02  Patient On (Oxygen Delivery Method): nasal cannula  O2 Flow (L/min): 2      12-27 @ 07:01  -  12-28 @ 07:00  --------------------------------------------------------  IN: 443 mL / OUT: 3525 mL / NET: -3082 mL    12-28 @ 07:01  -  12-28 @ 16:27  --------------------------------------------------------  IN: 377 mL / OUT: 875 mL / NET: -498 mL        PHYSICAL EXAM:    General: lying in bed, in no acute distress  HEENT: Neck supple, mmm, no jvd  Lungs: Normal respiratory effort, no intercostal retractions  Cardiovascular: regular rate  Abdomen: Soft, non-tender non-distended; No rebound or guarding  Extremities: wwp, no cce  Neurological: ABRAHAM, speech fluent  Skin: Warm and dry. No obvious rash    LABS:                        11.3   6.32  )-----------( 228      ( 28 Dec 2022 05:30 )             36.5     12-28    134<L>  |  92<L>  |  11  ----------------------------<  238<H>  3.7   |  32<H>  |  0.47<L>    Ca    8.4      28 Dec 2022 05:30  Mg     2.0     12-28    TPro  6.6  /  Alb  3.3  /  TBili  1.1  /  DBili  x   /  AST  38  /  ALT  30  /  AlkPhos  72  12-28      Culture Results:   Culture in progress (12-27 @ 17:45)    PTT - ( 27 Dec 2022 22:56 )  PTT:29.7 sec    Culture - Urine (collected 27 Dec 2022 17:45)  Source: Catheterized Catheterized  Preliminary Report (28 Dec 2022 09:21):    Culture in progress      RADIOLOGY & ADDITIONAL STUDIES:     Reviewed

## 2022-12-28 NOTE — PROGRESS NOTE ADULT - ASSESSMENT
89-year-old female with a PMHx of HTN, HLD, hypothyroidism, TIA, history of AVR, Afib (s/p remote ablation, on Eliquis) and CHF who presented with acute of chronic decompensated heart failure and Afib with RVR.          #Congestive Heart Failure      -further management as per primary team       -TTE (12/21): EF 55-60%, moderate to severe LVH, moderate to severe TR and pHTN     -further diuresis as per primary team, currently on IV lasix 40mg BID     -currently on metoprolol tartrate 12.5mg BID    -valsartan held 2/2 labile BPs as per primary team     -strict I/Os and fluid restriction         #Atrial Fibrillation with RVR      -further management as per primary team and EP     -s/p DCCV on 12/27  -continue with Eliquis 5mg BID    -currently on amiodarone 200mg daily and metoprolol tartrate 12.5mg BID      #UTI   #Urinary Retention    -patient has been afebrile with improved leukocytosis     -UCx: E. coli and Proteus (both sensitive to CTX) but concern for contamination, f/u repeat UCx   -continue with CTX 1g daily, duration to be determined once repeat UCx results     -plan for TOV following procedures and diuresis, consider urology consult if fails       #Concern for Entero-Vesicular Fistula    -CT-A/P without evidence of fistula (f/u final read)   -ACS consulted, appreciate recommendations, no surgical intervention indicated at this time      #Dilated Common Bile Duct and Intrahepatic Biliary Ductal Dilation     #Elevated Bilirubin (resolved)   -incidental finding on CT-A/P, also seen on RUQ US    -recommend GI consult +/- MRCP pending GI recommendations      #Hypothyroidism       -continue with levothyroxine 100mcg daily      -TSH and T4 within normal limits         #Microcytic Anemia       -iron studies most consistent with CARRILLO     -resume ferrous sulfate 325mg daily upon discharge      #Hyponatremia       -mild and asymptomatic, very likely hypervolemic in setting of ADHF        #Pre-Diabetes (A1c 6.4%)      -no history of DMII and is not on any medications      -outpatient PCP follow up        #HTN     -valsartan 40mg daily held due to labile BPs       #HLD      -continue with atorvastatin 20mg daily         #Cirrhosis    -incidental finding on CT-A/P, no current evidence for decompensated liver disease     -recommend GI consult for above, can comment on cirrhosis as well     DVT PPx: Eliquis       Dispo: home PT with 24/7 A

## 2022-12-28 NOTE — PROGRESS NOTE ADULT - SUBJECTIVE AND OBJECTIVE BOX
Subjective:  No acute events overnight.  Patient is doing well today without new complaints.  Denies HA, CP, SOB, abdominal pain, nausea, vomiting, fever, chills or diarrhea.     Objective:   Vital Signs:  T(C): 37.1 (28 Dec 2022 13:30), Max: 37.1 (28 Dec 2022 13:30)  T(F): 98.8 (28 Dec 2022 13:30), Max: 98.8 (28 Dec 2022 13:30)  HR: 98 (28 Dec 2022 13:00) (87 - 100)  BP: 133/60 (28 Dec 2022 13:00) (111/57 - 148/73)  BP(mean): 88 (28 Dec 2022 09:00) (88 - 88)  RR: 18 (28 Dec 2022 12:40) (17 - 18)  SpO2: 93% (28 Dec 2022 13:00) (92% - 98%)    Parameters below as of 28 Dec 2022 13:00  Patient On (Oxygen Delivery Method): nasal cannula  O2 Flow (L/min): 2    Physical Exam:   -Gen: NAD, resting in bed, pleasant and cooperative  -HEENT: EOMI, PERRL, no scleral icterus, no JVD, no bruits  -CV: normal S1 and S2, no murmurs appreciated   -Lungs: CTABL, normal respiratory effort on NC  -Ab: soft, NT, ND, normal BS  -: soto in place with orange/reddish urine   -Ext: no LE edema, no rashes  -Neuro: A&O x 3, CN 2-12 intact, no focal deficits     Labs:                        11.3   6.32  )-----------( 228      ( 28 Dec 2022 05:30 )             36.5       12-28    134<L>  |  92<L>  |  11  ----------------------------<  238<H>  3.7   |  32<H>  |  0.47<L>    Ca    8.4      28 Dec 2022 05:30  Mg     2.0     12-28    TPro  6.6  /  Alb  3.3  /  TBili  1.1  /  DBili  x   /  AST  38  /  ALT  30  /  AlkPhos  72  12-28    Microbiology:     Culture - Blood (collected 12-24-22 @ 20:32)  Source: .Blood Blood-Peripheral  Preliminary Report (12-27-22 @ 21:00):    No growth at 3 days.    Culture - Blood (collected 12-24-22 @ 20:32)  Source: .Blood Blood-Venous  Preliminary Report (12-27-22 @ 21:00):    No growth at 3 days.     Medications:  MEDICATIONS  (STANDING):  aMIOdarone    Tablet 200 milliGRAM(s) Oral daily  apixaban 5 milliGRAM(s) Oral two times a day  atorvastatin 20 milliGRAM(s) Oral at bedtime  benzonatate 100 milliGRAM(s) Oral three times a day  cefTRIAXone   IVPB      cefTRIAXone   IVPB 1000 milliGRAM(s) IV Intermittent every 24 hours  furosemide   Injectable 40 milliGRAM(s) IV Push two times a day  influenza  Vaccine (HIGH DOSE) 0.7 milliLiter(s) IntraMuscular once  ipratropium    for Nebulization 500 MICROGram(s) Nebulizer every 6 hours  levothyroxine 100 MICROGram(s) Oral daily  metoprolol succinate ER 25 milliGRAM(s) Oral daily  potassium chloride    Tablet ER 40 milliEquivalent(s) Oral once    MEDICATIONS  (PRN):  albuterol   0.5% 2.5 milliGRAM(s) Nebulizer every 6 hours PRN Shortness of Breath and/or Wheezing  ALPRAZolam 0.25 milliGRAM(s) Oral daily PRN Anxiety  sodium chloride 0.65% Nasal 1 Spray(s) Both Nostrils daily PRN Nasal Congestion

## 2022-12-28 NOTE — PROGRESS NOTE ADULT - PROBLEM SELECTOR PLAN 3
On 12/24 evening pt became lethargic w/ Tmax 100.9; currently non toxic appearing and HD stable  -Leukocytosis downtrending and has remained afebrile  -Blood Cx NGTD  -UA w/ mod LE, WBC >10 and bacteria  -UCx w/ E. Coli and Proteus m., concern for contamination; f/u repeat UCx  -Continue Ceftriaxone 1g IV QD x 7 days (last dose 12/31)  -s/p soto placement for urinary retention    #R/O Colovesical Fistula  -During straight cath and soto placement fecal matter noted however likely 2/2 contamination of prior primafit as pt experiencing diarrhea,  -Gen Surgery Team 4 consulted for possible colovesicular fistula  -CT A/P (12/24) - No evidence of enterovesicular fistula. Question nodular contour of the liver suggestive of cirrhosis. Dilated common bile duct and mild intrahepatic biliary ductal dilatation, slightly more than expected in the postcholecystectomy state.  -No acute surgical intervention at this time On 12/24 evening pt became lethargic w/ Tmax 100.9; currently non toxic appearing and HD stable  -Leukocytosis downtrending and has remained afebrile  -Blood Cx NGTD  -UA w/ mod LE, WBC >10 and bacteria  -UCx w/ E. Coli and Proteus m., concern for contamination; f/u repeat UCx  -Continue Ceftriaxone 1g IV QD x 7 days (last dose 12/31)  -TOV attempted on 12/28, f/u results from OVN team

## 2022-12-28 NOTE — PROGRESS NOTE ADULT - SUBJECTIVE AND OBJECTIVE BOX
Cardiology PA Adult Progress Note    SUBJECTIVE ASSESSMENT:  	  MEDICATIONS:  aMIOdarone    Tablet 200 milliGRAM(s) Oral daily  furosemide   Injectable 40 milliGRAM(s) IV Push two times a day  metoprolol tartrate 12.5 milliGRAM(s) Oral two times a day    cefTRIAXone   IVPB      cefTRIAXone   IVPB 1000 milliGRAM(s) IV Intermittent every 24 hours    albuterol   0.5% 2.5 milliGRAM(s) Nebulizer every 6 hours PRN  benzonatate 100 milliGRAM(s) Oral three times a day  ipratropium    for Nebulization 500 MICROGram(s) Nebulizer every 6 hours    ALPRAZolam 0.25 milliGRAM(s) Oral daily PRN      atorvastatin 20 milliGRAM(s) Oral at bedtime  levothyroxine 100 MICROGram(s) Oral daily    apixaban 5 milliGRAM(s) Oral two times a day  influenza  Vaccine (HIGH DOSE) 0.7 milliLiter(s) IntraMuscular once  sodium chloride 0.65% Nasal 1 Spray(s) Both Nostrils daily PRN    	  VITAL SIGNS:  T(C): 36.6 (12-28-22 @ 09:15), Max: 36.8 (12-27-22 @ 18:21)  HR: 96 (12-28-22 @ 12:40) (87 - 107)  BP: 118/59 (12-28-22 @ 12:40) (111/57 - 148/73)  RR: 18 (12-28-22 @ 12:40) (17 - 18)  SpO2: 94% (12-28-22 @ 12:40) (92% - 94%)  Wt(kg): --    I&O's Summary    27 Dec 2022 07:01  -  28 Dec 2022 07:00  --------------------------------------------------------  IN: 443 mL / OUT: 3525 mL / NET: -3082 mL    28 Dec 2022 07:01  -  28 Dec 2022 13:19  --------------------------------------------------------  IN: 377 mL / OUT: 0 mL / NET: 377 mL                                           PHYSICAL EXAM:  Appearance: Normal	  HEENT: Normal oral mucosa, PERRL, EOMI	  Neck: Supple, + JVD/ - JVD; ___ Carotid Bruit   Cardiovascular: Normal S1 S2, No murmurs  Respiratory: Lungs clear to auscultation/Decreased Breath Sounds/No Rales, Rhonchi, Wheezing	  Gastrointestinal:  Soft, Non-tender, + BS	  Skin: No rashes, No ecchymoses, No cyanosis  Extremities: Normal range of motion, No clubbing, cyanosis or edema  Vascular: Peripheral pulses palpable 2+ bilaterally  Neurologic: Non-focal  Psychiatry: A & O x 3, Mood & affect appropriate    LABS:	 	                        11.3   6.32  )-----------( 228      ( 28 Dec 2022 05:30 )             36.5     12-28    134<L>  |  92<L>  |  11  ----------------------------<  238<H>  3.7   |  32<H>  |  0.47<L>    Ca    8.4      28 Dec 2022 05:30  Mg     2.0     12-28    TPro  6.6  /  Alb  3.3  /  TBili  1.1  /  DBili  x   /  AST  38  /  ALT  30  /  AlkPhos  72  12-28    proBNP:   Lipid Profile:   HgA1c:   TSH:   PTT - ( 27 Dec 2022 22:56 )  PTT:29.7 sec Cardiology PA Adult Progress Note    SUBJECTIVE ASSESSMENT: Overnight no acute events; Patient laying comfortably in bed. Complaining of cough he feels is worsening, requesting lozenges. Currently denies CP, dizziness, palpitations, SOB, dyspnea, headaches, N/V/D/abdominal pain. Patient understands plan for the day.   	  MEDICATIONS:  aMIOdarone    Tablet 200 milliGRAM(s) Oral daily  furosemide   Injectable 40 milliGRAM(s) IV Push two times a day  metoprolol tartrate 12.5 milliGRAM(s) Oral two times a day  cefTRIAXone   IVPB      cefTRIAXone   IVPB 1000 milliGRAM(s) IV Intermittent every 24 hours  albuterol   0.5% 2.5 milliGRAM(s) Nebulizer every 6 hours PRN  benzonatate 100 milliGRAM(s) Oral three times a day  ipratropium    for Nebulization 500 MICROGram(s) Nebulizer every 6 hours  ALPRAZolam 0.25 milliGRAM(s) Oral daily PRN  atorvastatin 20 milliGRAM(s) Oral at bedtime  levothyroxine 100 MICROGram(s) Oral daily  apixaban 5 milliGRAM(s) Oral two times a day  influenza  Vaccine (HIGH DOSE) 0.7 milliLiter(s) IntraMuscular once  sodium chloride 0.65% Nasal 1 Spray(s) Both Nostrils daily PRN    	  VITAL SIGNS:  T(C): 36.6 (12-28-22 @ 09:15), Max: 36.8 (12-27-22 @ 18:21)  HR: 96 (12-28-22 @ 12:40) (87 - 107)  BP: 118/59 (12-28-22 @ 12:40) (111/57 - 148/73)  RR: 18 (12-28-22 @ 12:40) (17 - 18)  SpO2: 94% (12-28-22 @ 12:40) (92% - 94%)  Wt(kg): --    I&O's Summary    27 Dec 2022 07:01  -  28 Dec 2022 07:00  --------------------------------------------------------  IN: 443 mL / OUT: 3525 mL / NET: -3082 mL    28 Dec 2022 07:01  -  28 Dec 2022 13:19  --------------------------------------------------------  IN: 377 mL / OUT: 0 mL / NET: 377 mL                                           PHYSICAL EXAM:  Appearance: Normal	  HEENT: Normal oral mucosa, PERRL, EOMI	  Neck: Supple, + JVD; no Carotid Bruit   Cardiovascular: Normal S1 S2, No murmurs  Respiratory: Lungs clear to auscultation/Decreased Breath Sounds/No Rales, Rhonchi, Wheezing	  Gastrointestinal:  Soft, Non-tender, + BS	  Skin: No rashes, No ecchymoses, No cyanosis  Extremities: Normal range of motion, No clubbing, cyanosis or edema  Vascular: Peripheral pulses palpable 2+ bilaterally  Neurologic: Non-focal  Psychiatry: A & O x 3, Mood & affect appropriate    LABS:	 	                        11.3   6.32  )-----------( 228      ( 28 Dec 2022 05:30 )             36.5     12-28    134<L>  |  92<L>  |  11  ----------------------------<  238<H>  3.7   |  32<H>  |  0.47<L>    Ca    8.4      28 Dec 2022 05:30  Mg     2.0     12-28    TPro  6.6  /  Alb  3.3  /  TBili  1.1  /  DBili  x   /  AST  38  /  ALT  30  /  AlkPhos  72  12-28    proBNP: 5591 (12/24)  Lipid Profile: TChol 125, TG 68, LDL 52, HDL 59  HgA1c: 6.4  TSH: 2.83  PTT - ( 27 Dec 2022 22:56 )  PTT:29.7 sec Cardiology PA Adult Progress Note    SUBJECTIVE ASSESSMENT: Overnight no acute events; Patient laying comfortably in bed. Complaining of cough he feels is worsening, requesting lozenges. Currently denies CP, dizziness, palpitations, SOB, dyspnea, headaches, N/V/D/abdominal pain. Patient understands plan for the day.   	  MEDICATIONS:  aMIOdarone    Tablet 200 milliGRAM(s) Oral daily  furosemide   Injectable 40 milliGRAM(s) IV Push two times a day  metoprolol tartrate 12.5 milliGRAM(s) Oral two times a day  cefTRIAXone   IVPB      cefTRIAXone   IVPB 1000 milliGRAM(s) IV Intermittent every 24 hours  albuterol   0.5% 2.5 milliGRAM(s) Nebulizer every 6 hours PRN  benzonatate 100 milliGRAM(s) Oral three times a day  ipratropium    for Nebulization 500 MICROGram(s) Nebulizer every 6 hours  ALPRAZolam 0.25 milliGRAM(s) Oral daily PRN  atorvastatin 20 milliGRAM(s) Oral at bedtime  levothyroxine 100 MICROGram(s) Oral daily  apixaban 5 milliGRAM(s) Oral two times a day  influenza  Vaccine (HIGH DOSE) 0.7 milliLiter(s) IntraMuscular once  sodium chloride 0.65% Nasal 1 Spray(s) Both Nostrils daily PRN    	  VITAL SIGNS:  T(C): 36.6 (12-28-22 @ 09:15), Max: 36.8 (12-27-22 @ 18:21)  HR: 96 (12-28-22 @ 12:40) (87 - 107)  BP: 118/59 (12-28-22 @ 12:40) (111/57 - 148/73)  RR: 18 (12-28-22 @ 12:40) (17 - 18)  SpO2: 94% (12-28-22 @ 12:40) (92% - 94%)  Wt(kg): --    I&O's Summary    27 Dec 2022 07:01  -  28 Dec 2022 07:00  --------------------------------------------------------  IN: 443 mL / OUT: 3525 mL / NET: -3082 mL    28 Dec 2022 07:01  -  28 Dec 2022 13:19  --------------------------------------------------------  IN: 377 mL / OUT: 0 mL / NET: 377 mL                                           PHYSICAL EXAM:  Appearance: Normal	  HEENT: Normal oral mucosa, PERRL, EOMI	  Neck: Supple, + JVD; no Carotid Bruit   Cardiovascular: Normal S1 S2, No murmurs  Respiratory: mild crackles at lung bases  Gastrointestinal:  Soft, Non-tender, + BS	  Skin: No rashes, No ecchymoses, No cyanosis  Extremities: bilateral lower extremity edema 1+, Normal range of motion, No clubbing, cyanosis  Vascular: Peripheral pulses palpable 1+ bilaterally  Neurologic: Non-focal  Psychiatry: A & O x 3, Mood & affect appropriate    LABS:	 	                        11.3   6.32  )-----------( 228      ( 28 Dec 2022 05:30 )             36.5     12-28    134<L>  |  92<L>  |  11  ----------------------------<  238<H>  3.7   |  32<H>  |  0.47<L>    Ca    8.4      28 Dec 2022 05:30  Mg     2.0     12-28    TPro  6.6  /  Alb  3.3  /  TBili  1.1  /  DBili  x   /  AST  38  /  ALT  30  /  AlkPhos  72  12-28    proBNP: 5591 (12/24)  Lipid Profile: TChol 125, TG 68, LDL 52, HDL 59  HgA1c: 6.4  TSH: 2.83  PTT - ( 27 Dec 2022 22:56 )  PTT:29.7 sec

## 2022-12-28 NOTE — PROGRESS NOTE ADULT - PROBLEM SELECTOR PLAN 1
patient remains fluid overloaded, lungs w/ crackles, 2+ pitting edema in BLE, HD stable, SpO2 94% 2L NC  -TTE 12/21/22: LVEF 55-60%, mod-severe LVH, ESTER, bioprosthetic aortic valve w/ mild AR (peak 3.08, mean 22), mod MR, mod-severe TR, PASP 79mmHg,   -Repeat CXR 12/25 revealing improvement of vasc congestion  -net neg 1L/24H, c/w Lasix 40mg IV BID   -Holding home Valsartan and BB 2/2 labile SBP  -Wean off O2 as tolerated  -Core measure, daily weight, strict I&Os and 1L fluid restriction fluid status improving, very mild crackles, 1+ pitting edema in BLE, HD stable, SpO2 94% 2L NC, however reports worsening cough  -ordered guaifenasin and cepacol lozenges for cough; currently afebrile  -TTE 12/21/22: LVEF 55-60%, mod-severe LVH, ESTER, bioprosthetic aortic valve w/ mild AR (peak 3.08, mean 22), mod MR, mod-severe TR, PASP 79mmHg,   -f/u repeat CXR (12/28)  -c/w Lasix 40mg IV BID; added on spironolactone 25 mg daily (12/28) and diamox 500 mg IVP x1 for 12/29  -Holding home Valsartan and BB 2/2 labile SBP  -Wean off O2 as tolerated  -Core measure, daily weight, strict I&Os and 1L fluid restriction

## 2022-12-28 NOTE — CHART NOTE - NSCHARTNOTEFT_GEN_A_CORE
Admitting Diagnosis:   Patient is a 89y old  Female who presents with a chief complaint of acute CHF and AF w/ RVR (26 Dec 2022 07:42)      PAST MEDICAL & SURGICAL HISTORY:  HTN (hypertension)      HLD (hyperlipidemia)      Congestive heart failure (CHF)      TIA (transient ischemic attack)      Hypothyroidism      Atrial fibrillation          Current Nutrition Order:  DASH TLC 1000ml fluid restriction     PO Intake: Good (%) [  X ]  Fair (50-75%) [   ] Poor (<25%) [   ]    GI Issues:   BM+ 12/26     Pain:  +R Leg Pain     Skin Integrity:  Oyan 15   No pressure ulcer, Blister Noted   1+Edema (left leg; right leg), 2+Edema (left ankle; right ankle)     Labs:   12-28    134<L>  |  92<L>  |  11  ----------------------------<  238<H>  3.7   |  32<H>  |  0.47<L>    Ca    8.4      28 Dec 2022 05:30  Mg     2.0     12-28    TPro  6.6  /  Alb  3.3  /  TBili  1.1  /  DBili  x   /  AST  38  /  ALT  30  /  AlkPhos  72  12-28    CAPILLARY BLOOD GLUCOSE          Medications:  MEDICATIONS  (STANDING):  aMIOdarone    Tablet 200 milliGRAM(s) Oral daily  apixaban 5 milliGRAM(s) Oral two times a day  atorvastatin 20 milliGRAM(s) Oral at bedtime  benzonatate 100 milliGRAM(s) Oral three times a day  cefTRIAXone   IVPB      cefTRIAXone   IVPB 1000 milliGRAM(s) IV Intermittent every 24 hours  furosemide   Injectable 40 milliGRAM(s) IV Push two times a day  influenza  Vaccine (HIGH DOSE) 0.7 milliLiter(s) IntraMuscular once  ipratropium    for Nebulization 500 MICROGram(s) Nebulizer every 6 hours  levothyroxine 100 MICROGram(s) Oral daily  metoprolol succinate ER 25 milliGRAM(s) Oral daily    MEDICATIONS  (PRN):  albuterol   0.5% 2.5 milliGRAM(s) Nebulizer every 6 hours PRN Shortness of Breath and/or Wheezing  ALPRAZolam 0.25 milliGRAM(s) Oral daily PRN Anxiety  sodium chloride 0.65% Nasal 1 Spray(s) Both Nostrils daily PRN Nasal Congestion      5'6''  pounds+-10%  Wt 162 pounds BMI 26.1 %[id number]    Weight change:   pounds, varying wts noted - assume wts varying in setting of edema, pt with higher edema o/a comapred to now   12/28 150.7 pounds  12/26 160.9 pounds  12/24 171 pounds   12/23 Bedscale: 178 pounds     Estimated energy needs:   IBW used to calculate energy needs due to pt's current body weight exceeding 120% of IBW  Adjust for age and CHF; fluids per team    Estimated Energy Needs From (robin/kg)	25  Estimated Energy Needs To (robin/kg)	30  Estimated Energy Needs Calculated From (robin/kg)	1472  Estimated Energy Needs Calculated To (robin/kg)	1767    Estimated Protein Needs From (g/kg)	1  Estimated Protein Needs To (g/kg)	1.2  Estimated Protein Needs Calculated From (g/kg)	58.9  Estimated Protein Needs Calculated To (g/kg)	70.68    Subjective: 89yo F PMHx HTN, HLD, Hypothyroidism, TIA, s/p AVR, AFib, CHF (per outpatient bedside echo EF appears reduced) who presented to [facility] ED on 12/20/22 endorsing worsening LE edema over the past 2 months, admitted to cardiology for heart failure exacerbation and atrial fibrillation; TTE (12/21): EF 55-60%, moderate to severe LVH, moderate to severe TR and pHTN. Hospital course c/b lethargy and fever 12/24, pt found to have a UTI as well as fecal material seen in urine during soto placement; evaluated by gen surg and r/o colovesicular fistula. CT AP 12/24 with No evidence of enterovesicular fistula. Now with incidental finding on CT-A/P, also seen on RUQ US. Possible GI consult +/- MRCP pending GI recommendations. Pt is s/p DCCV on 12/27.     Pt seen at bedside. RN and Visitor present (did not state relation). RN reports pt had been NPO this AM, NPO since d/c and diet resumed, pt pending helio. Reported pt eating well during admit. PTA pt needed softer foods d/t issues chewing. Reported that pt has been tolerating meals without issues chewing/swallowing at this time.  Please see below for nutritions recommendations.    Prior PES: Food & Nutrition Related Knowledge Deficit RT limited prior diet education AEB need for diet education today  >> on going   Goal: Pt to name 2 teach back points    Recommendations:  1. Monitor need for Holding Diet Pending GI. While PO feasible: Cont with Diet.  2. Monitor PO intake/appetite, GI distress, diet tolerance, Daily weights.  3. Add oral nutrition supplements PRN Pending %PO.   4. Labs: monitor BMP, CBC, glucose, lytes, trend renal indices, LFTs.   5. RD to remain available for additional nutrition interventions as needed.     Education: Visitor/pt declined further education at this time. Reports they have handouts provided during time of last RD visit.     Risk Level: High [   ] Moderate [  X ] Low [   ]

## 2022-12-28 NOTE — CONSULT NOTE ADULT - ATTENDING COMMENTS
89 with dilated CBD  No other LFT abnormality  Seen/discussed with fellow at bedside  Reviewed imaging personally  Agree with above
Atrial fibrillaiton with rapid ventricular response. Agree with rate control and anticoagulaiton. WIll consider cardivoersion when euvolemic

## 2022-12-28 NOTE — PROGRESS NOTE ADULT - PROBLEM SELECTOR PLAN 9
TFTs wnl  -Continue Levothyroxine 100mcg QD    F: None  E: Replete if K<4 or Mag<2  N: DASH Diet, 1L fluid restriction  VTEppx: Heparin gtt  Dispo: Cardiac tele  PT: Home PT and 24hr HHA TFTs wnl  -Continue Levothyroxine 100mcg QD    F: None  E: Replete if K<4 or Mag<2  N: DASH Diet, 1L fluid restriction  VTEppx: restart eliquis in AM  Dispo: Cardiac tele  PT: JENNIE TFTs wnl  -Continue Levothyroxine 100mcg QD    F: None  E: Replete if K<4 or Mag<2  N: DASH Diet, 1L fluid restriction  VTEppx: eliquis  Dispo: Cardiac tele  PT: JENNIE

## 2022-12-28 NOTE — PROGRESS NOTE ADULT - PROBLEM SELECTOR PLAN 2
presented in AF w/ RVR, currently rate 110-120s, NPO for DCCV 12/27/2022  -If unsuccessful will consider AVN ablation w/ PPM placement this hospitalization  -Initiated on Amio 200mg TID however on 12/24 HR uncontrolled and now s/p Digoxin 500mcg IV x 1 and s/p Amio gtt x 24hrs  -Continue Amio 200mg QD  -Continue eliquis after DCCV  -Restarted patient on lopressor 12.5 mg BID presented in AF w/ RVR, s/p DCCV12/27, now in NSR  -Initiated on Amio 200mg TID however on 12/24 HR uncontrolled and now s/p Digoxin 500mcg IV x 1 and s/p Amio gtt x 24hrs  -Continue Amio 200mg QD  -Restart eliquis tomorrow  -transitioned lopressor 12.5 mg BID to toprol XL 25 mg qD (12/28)

## 2022-12-28 NOTE — PROGRESS NOTE ADULT - ASSESSMENT
89F w/ PMHx HTN, HLD, AF on Eliquis (remote h/o ablation >10yrs ago), HFpEF, s/p bioprosthetic AVR, Hypothyroidism and TIA, admitted to cardiac tele for further management of acute decompensated CHF and AF w/ RVR. EP following, pending DCCV 12/27 w/ possible AVN ablation w/ PPM placement during this hospitalization. Hospital course c/b lethargy and fever on 12/24, pt found to have a UTI as well as fecal material seen in urine during soto placement; evaluated by gen surg and r/o colovesicular fistula. Pt currently on IV abx x 7 days 89F w/ PMHx HTN, HLD, AF on Eliquis (remote h/o ablation >10yrs ago), HFpEF, s/p bioprosthetic AVR, Hypothyroidism and TIA, admitted to cardiac tele for further management of acute decompensated CHF and AF w/ RVR. Pt now s/p successful DCCV. Hospital course c/b lethargy and fever on 12/24, currently on IV abx x 7 days

## 2022-12-28 NOTE — PROGRESS NOTE ADULT - PROBLEM SELECTOR PLAN 4
labile SBP on 12/25 (80-100s), currently stable  -Continue Lasix 40mg IV BID and transition to PO in AM  -started patient on lopressor 12.5 mg BID  -Holding home Valsartan and Atenolol 2/2 labile BP, restart as tolerated labile SBP on 12/25 (80-100s), currently stable  -Continue Lasix 40mg IV BID and transition to PO in AM  -transitioned lopressor 12.5 mg BID to toprol 25 mg qd (12/28)  -Holding home Valsartan and Atenolol 2/2 labile BP, restart as tolerated

## 2022-12-29 LAB
ALBUMIN SERPL ELPH-MCNC: 3 G/DL — LOW (ref 3.3–5)
ALP SERPL-CCNC: 71 U/L — SIGNIFICANT CHANGE UP (ref 40–120)
ALT FLD-CCNC: 39 U/L — SIGNIFICANT CHANGE UP (ref 10–45)
ANION GAP SERPL CALC-SCNC: 6 MMOL/L — SIGNIFICANT CHANGE UP (ref 5–17)
AST SERPL-CCNC: 48 U/L — HIGH (ref 10–40)
BASOPHILS # BLD AUTO: 0.02 K/UL — SIGNIFICANT CHANGE UP (ref 0–0.2)
BASOPHILS NFR BLD AUTO: 0.3 % — SIGNIFICANT CHANGE UP (ref 0–2)
BILIRUB SERPL-MCNC: 1.1 MG/DL — SIGNIFICANT CHANGE UP (ref 0.2–1.2)
BUN SERPL-MCNC: 11 MG/DL — SIGNIFICANT CHANGE UP (ref 7–23)
CALCIUM SERPL-MCNC: 8.7 MG/DL — SIGNIFICANT CHANGE UP (ref 8.4–10.5)
CHLORIDE SERPL-SCNC: 93 MMOL/L — LOW (ref 96–108)
CO2 SERPL-SCNC: 32 MMOL/L — HIGH (ref 22–31)
CREAT SERPL-MCNC: 0.42 MG/DL — LOW (ref 0.5–1.3)
CULTURE RESULTS: NO GROWTH — SIGNIFICANT CHANGE UP
CULTURE RESULTS: SIGNIFICANT CHANGE UP
CULTURE RESULTS: SIGNIFICANT CHANGE UP
EGFR: 90 ML/MIN/1.73M2 — SIGNIFICANT CHANGE UP
EOSINOPHIL # BLD AUTO: 0.17 K/UL — SIGNIFICANT CHANGE UP (ref 0–0.5)
EOSINOPHIL NFR BLD AUTO: 2.4 % — SIGNIFICANT CHANGE UP (ref 0–6)
GLUCOSE SERPL-MCNC: 118 MG/DL — HIGH (ref 70–99)
HCT VFR BLD CALC: 35.9 % — SIGNIFICANT CHANGE UP (ref 34.5–45)
HGB BLD-MCNC: 11.1 G/DL — LOW (ref 11.5–15.5)
IMM GRANULOCYTES NFR BLD AUTO: 2.1 % — HIGH (ref 0–0.9)
LYMPHOCYTES # BLD AUTO: 0.89 K/UL — LOW (ref 1–3.3)
LYMPHOCYTES # BLD AUTO: 12.7 % — LOW (ref 13–44)
MAGNESIUM SERPL-MCNC: 1.9 MG/DL — SIGNIFICANT CHANGE UP (ref 1.6–2.6)
MCHC RBC-ENTMCNC: 24.6 PG — LOW (ref 27–34)
MCHC RBC-ENTMCNC: 30.9 GM/DL — LOW (ref 32–36)
MCV RBC AUTO: 79.6 FL — LOW (ref 80–100)
MONOCYTES # BLD AUTO: 1.12 K/UL — HIGH (ref 0–0.9)
MONOCYTES NFR BLD AUTO: 16 % — HIGH (ref 2–14)
NEUTROPHILS # BLD AUTO: 4.65 K/UL — SIGNIFICANT CHANGE UP (ref 1.8–7.4)
NEUTROPHILS NFR BLD AUTO: 66.5 % — SIGNIFICANT CHANGE UP (ref 43–77)
NRBC # BLD: 0 /100 WBCS — SIGNIFICANT CHANGE UP (ref 0–0)
PLATELET # BLD AUTO: 207 K/UL — SIGNIFICANT CHANGE UP (ref 150–400)
POTASSIUM SERPL-MCNC: 3.8 MMOL/L — SIGNIFICANT CHANGE UP (ref 3.5–5.3)
POTASSIUM SERPL-SCNC: 3.8 MMOL/L — SIGNIFICANT CHANGE UP (ref 3.5–5.3)
PROT SERPL-MCNC: 6.6 G/DL — SIGNIFICANT CHANGE UP (ref 6–8.3)
RBC # BLD: 4.51 M/UL — SIGNIFICANT CHANGE UP (ref 3.8–5.2)
RBC # FLD: 17.6 % — HIGH (ref 10.3–14.5)
SODIUM SERPL-SCNC: 131 MMOL/L — LOW (ref 135–145)
SPECIMEN SOURCE: SIGNIFICANT CHANGE UP
WBC # BLD: 7 K/UL — SIGNIFICANT CHANGE UP (ref 3.8–10.5)
WBC # FLD AUTO: 7 K/UL — SIGNIFICANT CHANGE UP (ref 3.8–10.5)

## 2022-12-29 PROCEDURE — 99233 SBSQ HOSP IP/OBS HIGH 50: CPT

## 2022-12-29 RX ORDER — SPIRONOLACTONE 25 MG/1
25 TABLET, FILM COATED ORAL EVERY 12 HOURS
Refills: 0 | Status: DISCONTINUED | OUTPATIENT
Start: 2022-12-29 | End: 2023-01-03

## 2022-12-29 RX ORDER — FUROSEMIDE 40 MG
40 TABLET ORAL THREE TIMES A DAY
Refills: 0 | Status: DISCONTINUED | OUTPATIENT
Start: 2022-12-29 | End: 2022-12-30

## 2022-12-29 RX ADMIN — SPIRONOLACTONE 25 MILLIGRAM(S): 25 TABLET, FILM COATED ORAL at 17:01

## 2022-12-29 RX ADMIN — Medication 500 MICROGRAM(S): at 07:15

## 2022-12-29 RX ADMIN — Medication 100 MILLIGRAM(S): at 07:10

## 2022-12-29 RX ADMIN — CEFTRIAXONE 100 MILLIGRAM(S): 500 INJECTION, POWDER, FOR SOLUTION INTRAMUSCULAR; INTRAVENOUS at 13:04

## 2022-12-29 RX ADMIN — Medication 0.25 MILLIGRAM(S): at 01:10

## 2022-12-29 RX ADMIN — Medication 500 MICROGRAM(S): at 17:01

## 2022-12-29 RX ADMIN — Medication 100 MILLIGRAM(S): at 00:14

## 2022-12-29 RX ADMIN — Medication 25 MILLIGRAM(S): at 07:10

## 2022-12-29 RX ADMIN — Medication 40 MILLIGRAM(S): at 07:10

## 2022-12-29 RX ADMIN — AMIODARONE HYDROCHLORIDE 200 MILLIGRAM(S): 400 TABLET ORAL at 07:10

## 2022-12-29 RX ADMIN — Medication 40 MILLIGRAM(S): at 13:04

## 2022-12-29 RX ADMIN — Medication 100 MICROGRAM(S): at 07:10

## 2022-12-29 RX ADMIN — APIXABAN 5 MILLIGRAM(S): 2.5 TABLET, FILM COATED ORAL at 07:10

## 2022-12-29 RX ADMIN — ATORVASTATIN CALCIUM 20 MILLIGRAM(S): 80 TABLET, FILM COATED ORAL at 00:14

## 2022-12-29 RX ADMIN — Medication 500 MICROGRAM(S): at 13:04

## 2022-12-29 RX ADMIN — Medication 100 MILLIGRAM(S): at 13:04

## 2022-12-29 RX ADMIN — SPIRONOLACTONE 25 MILLIGRAM(S): 25 TABLET, FILM COATED ORAL at 07:10

## 2022-12-29 RX ADMIN — ACETAZOLAMIDE 110 MILLIGRAM(S): 250 TABLET ORAL at 08:58

## 2022-12-29 RX ADMIN — SIMETHICONE 80 MILLIGRAM(S): 80 TABLET, CHEWABLE ORAL at 00:14

## 2022-12-29 RX ADMIN — APIXABAN 5 MILLIGRAM(S): 2.5 TABLET, FILM COATED ORAL at 17:00

## 2022-12-29 RX ADMIN — Medication 500 MICROGRAM(S): at 00:15

## 2022-12-29 RX ADMIN — Medication 600 MILLIGRAM(S): at 08:58

## 2022-12-29 NOTE — PROGRESS NOTE ADULT - SUBJECTIVE AND OBJECTIVE BOX
Subjective:  No acute events overnight.  Patient is doing well today without new complaints.  Denies HA, CP, SOB, abdominal pain, nausea, vomiting, fever, chills or diarrhea.     Objective:   Vital Signs:  T(C): 37 (29 Dec 2022 06:26), Max: 37.6 (28 Dec 2022 18:20)  T(F): 98.6 (29 Dec 2022 06:26), Max: 99.6 (28 Dec 2022 18:20)  HR: 94 (29 Dec 2022 08:51) (88 - 104)  BP: 118/54 (29 Dec 2022 08:51) (112/55 - 139/67)  BP(mean): 72 (29 Dec 2022 08:51) (72 - 96)  RR: 18 (29 Dec 2022 08:51) (16 - 18)  SpO2: 93% (29 Dec 2022 08:51) (93% - 98%)    Parameters below as of 29 Dec 2022 08:51  Patient On (Oxygen Delivery Method): nasal cannula  O2 Flow (L/min): 3    Physical Exam:   -Gen: NAD, resting in bed, pleasant and cooperative  -HEENT: EOMI, PERRL, no scleral icterus, no JVD, no bruits  -CV: normal S1 and S2, no murmurs appreciated   -Lungs: CTABL, normal respiratory effort on NC  -Ab: soft, NT, ND, normal BS  -: soto in place with orange/reddish urine   -Ext: no LE edema, no rashes  -Neuro: A&O x 3, CN 2-12 intact, no focal deficits     Labs:                        11.1   7.00  )-----------( 207      ( 29 Dec 2022 08:50 )             35.9       12-29    131<L>  |  93<L>  |  11  ----------------------------<  118<H>  3.8   |  32<H>  |  0.42<L>    Ca    8.7      29 Dec 2022 08:50  Mg     1.9     12-29    TPro  6.6  /  Alb  3.0<L>  /  TBili  1.1  /  DBili  x   /  AST  48<H>  /  ALT  39  /  AlkPhos  71  12-29    Microbiology:     Culture - Blood (collected 12-24-22 @ 20:32)  Source: .Blood Blood-Peripheral  Preliminary Report (12-28-22 @ 21:01):    No growth at 4 days.    Culture - Blood (collected 12-24-22 @ 20:32)  Source: .Blood Blood-Venous  Preliminary Report (12-28-22 @ 21:01):    No growth at 4 days.    Medications:  MEDICATIONS  (STANDING):  aMIOdarone    Tablet 200 milliGRAM(s) Oral daily  apixaban 5 milliGRAM(s) Oral two times a day  atorvastatin 20 milliGRAM(s) Oral at bedtime  benzonatate 100 milliGRAM(s) Oral three times a day  cefTRIAXone   IVPB      cefTRIAXone   IVPB 1000 milliGRAM(s) IV Intermittent every 24 hours  furosemide   Injectable 40 milliGRAM(s) IV Push three times a day  influenza  Vaccine (HIGH DOSE) 0.7 milliLiter(s) IntraMuscular once  ipratropium    for Nebulization 500 MICROGram(s) Nebulizer every 6 hours  levothyroxine 100 MICROGram(s) Oral daily  metoprolol succinate ER 25 milliGRAM(s) Oral daily  spironolactone 25 milliGRAM(s) Oral daily    MEDICATIONS  (PRN):  albuterol   0.5% 2.5 milliGRAM(s) Nebulizer every 6 hours PRN Shortness of Breath and/or Wheezing  guaiFENesin  milliGRAM(s) Oral every 12 hours PRN Cough  sodium chloride 0.65% Nasal 1 Spray(s) Both Nostrils daily PRN Nasal Congestion

## 2022-12-29 NOTE — OCCUPATIONAL THERAPY INITIAL EVALUATION ADULT - ADDITIONAL COMMENTS
Patient reports living with her adult great grandson in an elevator access apartment building with no HÉCTOR. Patient has a 24 hour 7 day a week HHA and an addition HHA for 9.5 hours a day who assists patient with ADL's and functional mobility as needed. Patient independent with ADL's most of the time. Patient is R hand dominant and has a walk in shower, shower chair, raised toilet seat, and rollator.

## 2022-12-29 NOTE — PROGRESS NOTE ADULT - ASSESSMENT
89F w/ PMHx HTN, HLD, AF on Eliquis (remote h/o ablation >10yrs ago), HFpEF, s/p bioprosthetic AVR, Hypothyroidism and TIA, admitted to cardiac tele for further management of acute decompensated CHF and AF w/ RVR. Pt now s/p successful DCCV. Hospital course c/b UTI, s/p abx now resolved.

## 2022-12-29 NOTE — PROVIDER CONTACT NOTE (OTHER) - SITUATION
Pt with bp 86/48 , generalized weakness
S/P straight cath. Urine output 400ml.
Pt has been having soft BP's, BARBARA Rubalcava requested a BP reading.

## 2022-12-29 NOTE — PROGRESS NOTE ADULT - PROBLEM SELECTOR PLAN 9
TFTs wnl  -Continue Levothyroxine 100mcg QD    F: None  E: Replete if K<4 or Mag<2  N: DASH Diet, 1L fluid restriction  VTEppx: eliquis  Dispo: Cardiac tele  PT: JENNIE after fluid optimized

## 2022-12-29 NOTE — PROGRESS NOTE ADULT - PROBLEM SELECTOR PLAN 1
fluid status improving, very mild crackles, edema in BLE, HD stable, SpO2 94% 3L NC, however reports worsening productive cough, afebrile, no WBC elevations, also was covered with abx for UTI, so most likely s/s CHF  -guaifenasin and cepacol lozenges for cough  -TTE 12/21/22: LVEF 55-60%, mod-severe LVH, ESTER, bioprosthetic aortic valve w/ mild AR (peak 3.08, mean 22), mod MR, mod-severe TR, PASP 79mmHg,   -CXT 12/28 - pulm congestion persisting  -current diuresis - Lasix 40mg IV TID; spironolactone 25 mg increased to BID; d/c'ed diamox 500 mg IVP x1  -Holding home Valsartan and BB 2/2 labile SBP  -Wean off O2 as tolerated  -Core measure, daily weight, strict I&Os and 1L fluid restriction

## 2022-12-29 NOTE — OCCUPATIONAL THERAPY INITIAL EVALUATION ADULT - GENERAL OBSERVATIONS, REHAB EVAL
PT Jacquie present. Patient HHA and great grandson present. Patient received semisupine in bed +tele +heplock IV, +primafit, NAD. Patient A&Ox2, agreeable and tolerated session fairly.

## 2022-12-29 NOTE — PROGRESS NOTE ADULT - ASSESSMENT
89-year-old female with a PMHx of HTN, HLD, hypothyroidism, TIA, history of AVR, Afib (s/p remote ablation, on Eliquis) and CHF who presented with acute of chronic decompensated heart failure and Afib with RVR.           #Congestive Heart Failure       -further management as per primary team        -TTE (12/21): EF 55-60%, moderate to severe LVH, moderate to severe TR and pHTN      -further diuresis as per primary team, currently on IV lasix 40mg TID      -currently on metoprolol succinate 25mg daily and spironolactone 25mg daily     -valsartan held 2/2 labile BPs as per primary team      -strict I/Os and fluid restriction          #Atrial Fibrillation with RVR       -further management as per primary team and EP, s/p DCCV on 12/27   -continue with Eliquis 5mg BID     -currently on amiodarone 200mg daily and metoprolol succinate 25mg daily     #UTI    -UCx (12/25): E. coli and Proteus but concern for contamination   -UCx (12/27): no growth, previous UCx likely contaminant   -discontinue CTX, s/p 3-day course (12/26 - 12/28)     #Urinary Retention     -failed TOV, undergoing bladder scans and may need replacement of soto   -will need outpatient urology follow up vs. TOV at [facility] once discharged     #Concern for Entero-Vesicular Fistula     -CT-A/P without evidence of fistula (f/u final read)   -ACS consulted, appreciate recommendations, no surgical intervention indicated at this time       #Dilated Common Bile Duct and Intrahepatic Biliary Ductal Dilation      #Elevated Bilirubin (resolved)    -incidental finding on CT-A/P, also seen on RUQ US     -GI consulted, plan for outpatient follow up      #Hypothyroidism        -continue with levothyroxine 100mcg daily       -TSH and T4 within normal limits          #Microcytic Anemia        -iron studies most consistent with CARRILLO      -resume ferrous sulfate 325mg daily upon discharge       #Hyponatremia        -mild and asymptomatic, very likely hypervolemic in setting of ADHF         #Pre-Diabetes (A1c 6.4%)       -no history of DMII and is not on any medications       -outpatient PCP follow up         #HTN      -valsartan 40mg daily held due to labile BPs        #HLD       -continue with atorvastatin 20mg daily          #Cirrhosis     -incidental finding on CT-A/P, no current evidence for decompensated liver disease      -outpatient GI follow up      DVT PPx: Eliquis       Dispo: home PT with 24/7 HHA, likely tomorrow

## 2022-12-29 NOTE — OCCUPATIONAL THERAPY INITIAL EVALUATION ADULT - DIAGNOSIS, OT EVAL
Patient presents with decreased overall strength, balance, activity tolerance, on 2 L O2 via NC, cough, impacting independence with functional activities and mobility.

## 2022-12-29 NOTE — PROGRESS NOTE ADULT - PROBLEM SELECTOR PLAN 8
Elevated TBili 1.8, now resolved  -RUQ US (12/24) - Diffuse dilatation common bile duct up to 1.8 cm and mild intrahepatic duct dilatation, greater than expected for postcholecystectomy.   -CT A/P (12/24) - No evidence of enterovesicular fistula. Question nodular contour of the liver suggestive of cirrhosis. Dilated common bile duct and mild intrahepatic biliary ductal dilatation, slightly more than expected in the postcholecystectomy state.  -GI following - No need to pursue MRCP in absence of clinical symptoms/resolved elevated LT's, can follow up with GI outpatient

## 2022-12-29 NOTE — PROGRESS NOTE ADULT - SUBJECTIVE AND OBJECTIVE BOX
Cardiology PA Adult Progress Note    SUBJECTIVE ASSESSMENT: Patient laying comfortably in bed. She reports worsening productive cough as well as 3 episodes of diarrhea overnight. Currently denies CP, dizziness, palpitations, SOB, dyspnea, coughing, headaches, N/V/D/abdominal pain. Patient understands plan for the day.   	  MEDICATIONS:  aMIOdarone    Tablet 200 milliGRAM(s) Oral daily  furosemide   Injectable 40 milliGRAM(s) IV Push three times a day  metoprolol succinate ER 25 milliGRAM(s) Oral daily  spironolactone 25 milliGRAM(s) Oral every 12 hours  albuterol   0.5% 2.5 milliGRAM(s) Nebulizer every 6 hours PRN  benzonatate 100 milliGRAM(s) Oral three times a day  guaiFENesin  milliGRAM(s) Oral every 12 hours PRN  ipratropium    for Nebulization 500 MICROGram(s) Nebulizer every 6 hours  atorvastatin 20 milliGRAM(s) Oral at bedtime  levothyroxine 100 MICROGram(s) Oral daily  apixaban 5 milliGRAM(s) Oral two times a day  influenza  Vaccine (HIGH DOSE) 0.7 milliLiter(s) IntraMuscular once  sodium chloride 0.65% Nasal 1 Spray(s) Both Nostrils daily PRN    	  VITAL SIGNS:  T(C): 37 (12-29-22 @ 06:26), Max: 37.3 (12-28-22 @ 22:16)  HR: 77 (12-29-22 @ 16:57) (77 - 104)  BP: 116/58 (12-29-22 @ 16:57) (99/52 - 139/67)  RR: 18 (12-29-22 @ 16:57) (16 - 18)  SpO2: 97% (12-29-22 @ 16:57) (93% - 99%)  Wt(kg): --    I&O's Summary    28 Dec 2022 07:01  -  29 Dec 2022 07:00  --------------------------------------------------------  IN: 617 mL / OUT: 1295 mL / NET: -678 mL    29 Dec 2022 07:01  -  29 Dec 2022 19:03  --------------------------------------------------------  IN: 800 mL / OUT: 1850 mL / NET: -1050 mL                                           PHYSICAL EXAM:  Appearance: Normal	  HEENT: Normal oral mucosa, PERRL, EOMI	  Neck: Supple, + JVD; no carotid Bruit   Cardiovascular: Normal S1 S2, No murmurs  Respiratory: very mild Rales of lower lung fields, Wheezing	  Gastrointestinal:  Soft, Non-tender, + BS	  Skin: No rashes, No ecchymoses, No cyanosis  Extremities: Normal range of motion, No clubbing, cyanosis; trace lower extremity edema  Vascular: Peripheral pulses palpable 2+ bilaterally  Neurologic: Non-focal  Psychiatry: A & O x 3, Mood & affect appropriate    LABS:	 	                                  11.1   7.00  )-----------( 207      ( 29 Dec 2022 08:50 )             35.9     12-29    131<L>  |  93<L>  |  11  ----------------------------<  118<H>  3.8   |  32<H>  |  0.42<L>    Ca    8.7      29 Dec 2022 08:50  Mg     1.9     12-29    TPro  6.6  /  Alb  3.0<L>  /  TBili  1.1  /  DBili  x   /  AST  48<H>  /  ALT  39  /  AlkPhos  71  12-29  proBNP: 5591 (12/24)  Lipid Profile: TChol 125, TG 68, LDL 52, HDL 59  HgA1c: 6.4  TSH: 2.83  PTT - ( 27 Dec 2022 22:56 )  PTT:29.7 sec

## 2022-12-29 NOTE — PROGRESS NOTE ADULT - PROBLEM SELECTOR PLAN 4
labile SBP on 12/25 (80-100s), currently stable  -Continue Lasix 40mg IV TID as well as percy 25 mg BID and toprol 25 mg QD  -Holding home Valsartan and Atenolol 2/2 labile BP, restart as tolerated

## 2022-12-29 NOTE — PROVIDER CONTACT NOTE (OTHER) - BACKGROUND
First straight cath, second bladder scan since brittany cisneros 12/28/2022.
Pt came in for chronic HF and is being diuresed.
Chronic Heart Failure, Rapid A fib

## 2022-12-29 NOTE — PROGRESS NOTE ADULT - PROBLEM SELECTOR PLAN 3
On 12/24 evening pt became lethargic w/ Tmax 100.9; currently non toxic appearing and HD stable  -Leukocytosis downtrending and has remained afebrile  -Blood Cx NGTD  -UA w/ mod LE, WBC >10 and bacteria  -UCx w/ E. Coli and Proteus m., concern for contamination  -Repeat UCx finalized, showed no growth  -d/c'ed Ceftriaxone 1g QD IV on 12/29, completed 5 day course  -TOV attempted on 12/28, patient failed, straight cath'ed twice with bladded scans showing 900+ CC; soto put in again 12/29

## 2022-12-29 NOTE — PROGRESS NOTE ADULT - PROBLEM SELECTOR PLAN 2
presented in AF w/ RVR, s/p DCCV12/27, now in NSR  -Initiated on Amio 200mg TID however on 12/24 HR uncontrolled and now s/p Digoxin 500mcg IV x 1 and s/p Amio gtt x 24hrs  -Continue Amio 200mg QD  -AC - on eliquis 5 mg BID  -Rate control - toprol 25 mg qd

## 2022-12-29 NOTE — OCCUPATIONAL THERAPY INITIAL EVALUATION ADULT - MODIFIED CLINICAL TEST OF SENSORY INTEGRATION IN BALANCE TEST
Patient functionally side stepped to the R x 5, to the L x 5, x 5 from bed<>bedside chair with Mod A x 2 persons and RW. Patient noted with poor postural control, decreased step length, narrow base of support requiring mod verbal/tactile cues for proper positioning and safety throughout.

## 2022-12-30 LAB
ALBUMIN SERPL ELPH-MCNC: 3.1 G/DL — LOW (ref 3.3–5)
ALP SERPL-CCNC: 66 U/L — SIGNIFICANT CHANGE UP (ref 40–120)
ALT FLD-CCNC: 36 U/L — SIGNIFICANT CHANGE UP (ref 10–45)
ANION GAP SERPL CALC-SCNC: 7 MMOL/L — SIGNIFICANT CHANGE UP (ref 5–17)
AST SERPL-CCNC: 44 U/L — HIGH (ref 10–40)
BASOPHILS # BLD AUTO: 0.03 K/UL — SIGNIFICANT CHANGE UP (ref 0–0.2)
BASOPHILS NFR BLD AUTO: 0.5 % — SIGNIFICANT CHANGE UP (ref 0–2)
BILIRUB SERPL-MCNC: 0.8 MG/DL — SIGNIFICANT CHANGE UP (ref 0.2–1.2)
BUN SERPL-MCNC: 9 MG/DL — SIGNIFICANT CHANGE UP (ref 7–23)
CALCIUM SERPL-MCNC: 8.7 MG/DL — SIGNIFICANT CHANGE UP (ref 8.4–10.5)
CHLORIDE SERPL-SCNC: 98 MMOL/L — SIGNIFICANT CHANGE UP (ref 96–108)
CO2 SERPL-SCNC: 29 MMOL/L — SIGNIFICANT CHANGE UP (ref 22–31)
CREAT SERPL-MCNC: 0.46 MG/DL — LOW (ref 0.5–1.3)
EGFR: 88 ML/MIN/1.73M2 — SIGNIFICANT CHANGE UP
EOSINOPHIL # BLD AUTO: 0.28 K/UL — SIGNIFICANT CHANGE UP (ref 0–0.5)
EOSINOPHIL NFR BLD AUTO: 4.5 % — SIGNIFICANT CHANGE UP (ref 0–6)
GLUCOSE SERPL-MCNC: 108 MG/DL — HIGH (ref 70–99)
HCT VFR BLD CALC: 37.3 % — SIGNIFICANT CHANGE UP (ref 34.5–45)
HGB BLD-MCNC: 11.2 G/DL — LOW (ref 11.5–15.5)
IMM GRANULOCYTES NFR BLD AUTO: 3.7 % — HIGH (ref 0–0.9)
LYMPHOCYTES # BLD AUTO: 0.73 K/UL — LOW (ref 1–3.3)
LYMPHOCYTES # BLD AUTO: 11.8 % — LOW (ref 13–44)
MAGNESIUM SERPL-MCNC: 1.9 MG/DL — SIGNIFICANT CHANGE UP (ref 1.6–2.6)
MCHC RBC-ENTMCNC: 24.3 PG — LOW (ref 27–34)
MCHC RBC-ENTMCNC: 30 GM/DL — LOW (ref 32–36)
MCV RBC AUTO: 80.9 FL — SIGNIFICANT CHANGE UP (ref 80–100)
MONOCYTES # BLD AUTO: 0.86 K/UL — SIGNIFICANT CHANGE UP (ref 0–0.9)
MONOCYTES NFR BLD AUTO: 13.9 % — SIGNIFICANT CHANGE UP (ref 2–14)
NEUTROPHILS # BLD AUTO: 4.04 K/UL — SIGNIFICANT CHANGE UP (ref 1.8–7.4)
NEUTROPHILS NFR BLD AUTO: 65.6 % — SIGNIFICANT CHANGE UP (ref 43–77)
NRBC # BLD: 0 /100 WBCS — SIGNIFICANT CHANGE UP (ref 0–0)
PLATELET # BLD AUTO: 205 K/UL — SIGNIFICANT CHANGE UP (ref 150–400)
POTASSIUM SERPL-MCNC: 3.8 MMOL/L — SIGNIFICANT CHANGE UP (ref 3.5–5.3)
POTASSIUM SERPL-SCNC: 3.8 MMOL/L — SIGNIFICANT CHANGE UP (ref 3.5–5.3)
PROT SERPL-MCNC: 6.6 G/DL — SIGNIFICANT CHANGE UP (ref 6–8.3)
RBC # BLD: 4.61 M/UL — SIGNIFICANT CHANGE UP (ref 3.8–5.2)
RBC # FLD: 17.8 % — HIGH (ref 10.3–14.5)
SODIUM SERPL-SCNC: 134 MMOL/L — LOW (ref 135–145)
WBC # BLD: 6.17 K/UL — SIGNIFICANT CHANGE UP (ref 3.8–10.5)
WBC # FLD AUTO: 6.17 K/UL — SIGNIFICANT CHANGE UP (ref 3.8–10.5)

## 2022-12-30 PROCEDURE — 99233 SBSQ HOSP IP/OBS HIGH 50: CPT

## 2022-12-30 RX ORDER — LOPERAMIDE HCL 2 MG
2 TABLET ORAL ONCE
Refills: 0 | Status: COMPLETED | OUTPATIENT
Start: 2022-12-30 | End: 2022-12-30

## 2022-12-30 RX ORDER — FUROSEMIDE 40 MG
40 TABLET ORAL EVERY 12 HOURS
Refills: 0 | Status: DISCONTINUED | OUTPATIENT
Start: 2022-12-31 | End: 2023-01-01

## 2022-12-30 RX ORDER — LANOLIN ALCOHOL/MO/W.PET/CERES
5 CREAM (GRAM) TOPICAL ONCE
Refills: 0 | Status: COMPLETED | OUTPATIENT
Start: 2022-12-30 | End: 2022-12-31

## 2022-12-30 RX ADMIN — Medication 500 MICROGRAM(S): at 11:58

## 2022-12-30 RX ADMIN — APIXABAN 5 MILLIGRAM(S): 2.5 TABLET, FILM COATED ORAL at 05:18

## 2022-12-30 RX ADMIN — Medication 100 MICROGRAM(S): at 05:19

## 2022-12-30 RX ADMIN — SPIRONOLACTONE 25 MILLIGRAM(S): 25 TABLET, FILM COATED ORAL at 05:44

## 2022-12-30 RX ADMIN — Medication 500 MICROGRAM(S): at 17:56

## 2022-12-30 RX ADMIN — AMIODARONE HYDROCHLORIDE 200 MILLIGRAM(S): 400 TABLET ORAL at 05:19

## 2022-12-30 RX ADMIN — Medication 40 MILLIGRAM(S): at 05:46

## 2022-12-30 RX ADMIN — Medication 100 MILLIGRAM(S): at 05:52

## 2022-12-30 RX ADMIN — Medication 500 MICROGRAM(S): at 05:19

## 2022-12-30 RX ADMIN — Medication 100 MILLIGRAM(S): at 14:02

## 2022-12-30 RX ADMIN — Medication 100 MILLIGRAM(S): at 21:29

## 2022-12-30 RX ADMIN — Medication 2 MILLIGRAM(S): at 17:57

## 2022-12-30 RX ADMIN — ATORVASTATIN CALCIUM 20 MILLIGRAM(S): 80 TABLET, FILM COATED ORAL at 21:29

## 2022-12-30 RX ADMIN — Medication 40 MILLIGRAM(S): at 14:02

## 2022-12-30 RX ADMIN — APIXABAN 5 MILLIGRAM(S): 2.5 TABLET, FILM COATED ORAL at 17:56

## 2022-12-30 RX ADMIN — SPIRONOLACTONE 25 MILLIGRAM(S): 25 TABLET, FILM COATED ORAL at 17:57

## 2022-12-30 RX ADMIN — Medication 25 MILLIGRAM(S): at 05:18

## 2022-12-30 NOTE — PROGRESS NOTE ADULT - PROBLEM SELECTOR PLAN 6
--H/H stable; Hgb ~10-11s, no active signs of bleeding.   --Iron studies c/w CARRILLO; initiated IV iron eventually but stopped once pt w/ infection.  --Restart PO Iron supplementation.

## 2022-12-30 NOTE — PROGRESS NOTE ADULT - ASSESSMENT
89-year-old female with a PMHx of HTN, HLD, hypothyroidism, TIA, history of AVR, Afib (s/p remote ablation, on Eliquis) and CHF who presented with acute of chronic decompensated heart failure and Afib with RVR.            #Congestive Heart Failure        -further management as per primary team         -TTE (12/21): EF 55-60%, moderate to severe LVH, moderate to severe TR and pHTN       -further diuresis as per primary team, currently on IV lasix 40mg TID       -currently on metoprolol succinate 25mg daily and spironolactone 25mg BID   -valsartan held 2/2 labile BPs as per primary team            #Atrial Fibrillation with RVR        -further management as per primary team and EP, s/p DCCV on 12/27    -continue with Eliquis 5mg BID      -currently on amiodarone 200mg daily and metoprolol succinate 25mg daily      #UTI     -UCx (12/25): E. coli and Proteus but concern for contamination    -UCx (12/27): no growth, previous UCx likely contaminant    -s/p 3-day course of CTX, (12/26 - 12/28)      #Urinary Retention      -failed TOV, recommend repeat TOV prior to discharge   -will need outpatient urology follow up vs. repeat TOV [facility]       #Concern for Entero-Vesicular Fistula      -CT-A/P without evidence of fistula   -ACS consulted, appreciate recommendations, no surgical intervention indicated at this time        #Dilated Common Bile Duct and Intrahepatic Biliary Ductal Dilation       #Elevated Bilirubin (resolved)     -incidental finding on CT-A/P, also seen on RUQ US      -GI consulted, plan for outpatient follow up       #Hypothyroidism         -continue with levothyroxine 100mcg daily        -TSH and T4 within normal limits          #Microcytic Anemia         -iron studies most consistent with CARRILLO       -resume ferrous sulfate 325mg daily upon discharge        #Hyponatremia         -mild and asymptomatic, very likely hypervolemic in setting of ADHF          #Pre-Diabetes (A1c 6.4%)        -no history of DMII and is not on any medications        -outpatient PCP follow up          #HTN       -continue with spironolactone 25mg BID and metoprolol succinate 25mg daily    -valsartan 40mg daily held due to labile BPs         #HLD        -continue with atorvastatin 20mg daily           #Cirrhosis      -incidental finding on CT-A/P, no current evidence for decompensated liver disease       -outpatient GI follow up       DVT PPx: Eliquis        Dispo: [facility], not medically ready

## 2022-12-30 NOTE — PROGRESS NOTE ADULT - ASSESSMENT
89F w/ PMHx HTN, HLD, AF on Eliquis (remote h/o ablation >10yrs ago), HFpEF, s/p bioprosthetic AVR, Hypothyroidism and TIA, admitted to cardiac tele for further management of acute decompensated CHF and AF w/ RVR. Pt now s/p successful DCCV. Hospital course c/b UTI, s/p abx now resolved.  89F w/ PMHx HTN, HLD, AF on Eliquis (remote h/o ablation >10yrs ago), HFpEF, s/p bioprosthetic AVR, Hypothyroidism and TIA, admitted to cardiac tele for further management of acute decompensated CHF and AF w/ RVR. Pt now s/p successful DCCV. Hospital course c/b UTI, s/p abx now resolved.      Problem/Plan - 1:  ·  Problem: Chronic diastolic congestive heart failure.   ·  Plan:   --Volume status improving w/ LE edema much improved. Very faint rales and appreciate JVD on exam. SpO2 96% on 2L NC.   --TTE (12/21/22): LVEF 55-60%, mod-severe LVH, ESTER, mild AR (bioprosth AV), mod MR, mod-severe TR, PASP 79.   --CXR (12/28/22) w/ persistent pulm congestion and B/L pleural effusion.   --I/O: -14.4L/total, -1.7L/24hrs.   --    fluid status improving, very mild crackles, edema in BLE, HD stable, SpO2 94% 3L NC, however reports worsening productive cough, afebrile, no WBC elevations, also was covered with abx for UTI, so most likely s/s CHF  -guaifenasin and cepacol lozenges for cough  -TTE 12/21/22: LVEF 55-60%, mod-severe LVH, ESTER, bioprosthetic aortic valve w/ mild AR (peak 3.08, mean 22), mod MR, mod-severe TR, PASP 79mmHg,   -CXT 12/28 - pulm congestion persisting  -current diuresis - Lasix 40mg IV TID; spironolactone 25 mg increased to BID; d/c'ed diamox 500 mg IVP x1  -Holding home Valsartan and BB 2/2 labile SBP  -Wean off O2 as tolerated  -Core measure, daily weight, strict I&Os and 1L fluid restriction.     Problem/Plan - 2:  ·  Problem: Atrial fibrillation.   ·  Plan: presented in AF w/ RVR, s/p DCCV12/27, now in NSR  -Initiated on Amio 200mg TID however on 12/24 HR uncontrolled and now s/p Digoxin 500mcg IV x 1 and s/p Amio gtt x 24hrs  -Continue Amio 200mg QD  -AC - on eliquis 5 mg BID  -Rate control - toprol 25 mg qd.     Problem/Plan - 3:  ·  Problem: UTI (urinary tract infection).   ·  Plan: On 12/24 evening pt became lethargic w/ Tmax 100.9; currently non toxic appearing and HD stable  -Leukocytosis downtrending and has remained afebrile  -Blood Cx NGTD  -UA w/ mod LE, WBC >10 and bacteria  -UCx w/ E. Coli and Proteus m., concern for contamination  -Repeat UCx finalized, showed no growth  -d/c'ed Ceftriaxone 1g QD IV on 12/29, completed 5 day course  -TOV attempted on 12/28, patient failed, straight cath'ed twice with bladded scans showing 900+ CC; soto put in again 12/29.     Problem/Plan - 4:  ·  Problem: HTN (hypertension).   ·  Plan: labile SBP on 12/25 (80-100s), currently stable  -Continue Lasix 40mg IV TID as well as percy 25 mg BID and toprol 25 mg QD  -Holding home Valsartan and Atenolol 2/2 labile BP, restart as tolerated.     Problem/Plan - 5:  ·  Problem: Valvular heart disease.   ·  Plan: h/o bioprosthetic AVR  -TTE 12/21 revealing functioning bioprosthetic AV w/ mild AR, mod MR and mod-severe TR  -Continue w/ above diuresis.     Problem/Plan - 6:  ·  Problem: HLD (hyperlipidemia).   ·  Plan: Lipids wnl  -Continue Atorvastatin 20mg HS.     Problem/Plan - 7:  ·  Problem: Microcytic anemia.   ·  Plan: H/H remaining stable ~ 10-11s, no active signs of bleeding  -Iron studies c/w CARRILLO  -Pt initiated on IV iron, however now held i/s/o active infection  -Start PO Iron supplementation on d/c.     Problem/Plan - 8:  ·  Problem: Elevated bilirubin.   ·  Plan: Elevated TBili 1.8, now resolved  -RUQ US (12/24) - Diffuse dilatation common bile duct up to 1.8 cm and mild intrahepatic duct dilatation, greater than expected for postcholecystectomy.   -CT A/P (12/24) - No evidence of enterovesicular fistula. Question nodular contour of the liver suggestive of cirrhosis. Dilated common bile duct and mild intrahepatic biliary ductal dilatation, slightly more than expected in the postcholecystectomy state.  -GI following - No need to pursue MRCP in absence of clinical symptoms/resolved elevated LT's, can follow up with GI outpatient.     Problem/Plan - 9:  ·  Problem: Hypothyroidism.   ·  Plan: TFTs wnl  -Continue Levothyroxine 100mcg QD    F: None  E: Replete if K<4 or Mag<2  N: DASH Diet, 1L fluid restriction  VTEppx: eliquis  Dispo: Cardiac tele  PT: [facility]ENN[facility] after fluid optimized.   89F w/ PMHx HTN, HLD, AF on Eliquis (remote h/o ablation >10yrs ago), HFpEF, s/p bioprosthetic AVR, Hypothyroidism and TIA, admitted to cardiac tele for further management of acute decompensated CHF and AF w/ RVR. Pt now s/p successful DCCV. Hospital course c/b UTI, s/p abx now resolved. Continue IV diuresis over the weekend and likely clear for discharge parminder next week (dispo [facility]ENN[facility] - accepted at [facility] rehab).

## 2022-12-30 NOTE — PROGRESS NOTE ADULT - PROBLEM SELECTOR PLAN 3
--Hosp course c/b UTI w/ UCx (+) for E. coli and Proteus m (concern for contamination).   --s/p Ceftriaxone 1000mg IV QD x5 days.   --Failed TOV 12/28/22, striaght cath x2 then subsequent bladder scan w/ >900cc - soto placed again 12/29/22.

## 2022-12-30 NOTE — PROGRESS NOTE ADULT - PROBLEM SELECTOR PLAN 1
--Volume status improving w/ LE edema much improved. Very faint rales and appreciate JVD on exam. SpO2 96% on 2L NC.   --TTE (12/21/22): LVEF 55-60%, mod-severe LVH, ESTER, mild AR (bioprosth AV), mod MR, mod-severe TR, PASP 79.   --CXR (12/28/22) w/ persistent pulm congestion and B/L pleural effusion.   --I/O: -14.4L/total, -1.7L/24hrs.   --CONT: Lasix 40mg IV TID, Spironolactone 25mg PO BID.   --Holding home Valsartan due to labile BPs. --Volume status improving w/ LE edema much improved. Very faint rales and appreciate JVD on exam. SpO2 96% on 2L NC.   --TTE (12/21/22): LVEF 55-60%, mod-severe LVH, ESTER, mild AR (bioprosth AV), mod MR, mod-severe TR, PASP 79.   --CXR (12/28/22) w/ persistent pulm congestion and B/L pleural effusion.   --I/O: -14.4L/total, -1.7L/24hrs.   --CONT: Lasix 40mg IV BID (decreased from BID today 12/30/22), Spironolactone 25mg PO BID.   --Holding home Valsartan due to labile BPs.

## 2022-12-30 NOTE — PROGRESS NOTE ADULT - PROBLEM SELECTOR PLAN 4
--Labile SBP  over past 24hrs.   --Continue to hold home Valsartan; discontinued home Atenolol as pt now on Toprol

## 2022-12-30 NOTE — PROGRESS NOTE ADULT - SUBJECTIVE AND OBJECTIVE BOX
Subjective:  No acute events overnight.  Patient is doing well today and states she feels overall better.  Had a bowel movement today, brown in color, loose in consistency but not diarrhea.  Denies HA, CP, SOB, abdominal pain, nausea, vomiting, fever, chills or diarrhea.     Objective:   Vital Signs:  T(C): 36.2 (30 Dec 2022 09:58), Max: 36.7 (29 Dec 2022 22:16)  T(F): 97.1 (30 Dec 2022 09:58), Max: 98.1 (29 Dec 2022 22:16)  HR: 99 (30 Dec 2022 08:18) (74 - 99)  BP: 154/96 (30 Dec 2022 08:18) (96/54 - 154/96)  BP(mean): 120 (30 Dec 2022 08:18) (73 - 120)  RR: 18 (30 Dec 2022 08:18) (16 - 18)  SpO2: 94% (30 Dec 2022 08:18) (94% - 99%)    Parameters below as of 30 Dec 2022 08:18  Patient On (Oxygen Delivery Method): nasal cannula  O2 Flow (L/min): 2    Physical Exam:   -Gen: NAD, resting in bed, pleasant and cooperative  -HEENT: EOMI, PERRL, no scleral icterus, no JVD, no bruits  -CV: normal S1 and S2, no murmurs appreciated   -Lungs: CTABL, normal respiratory effort on NC  -Ab: soft, NT, ND, normal BS  -: soto in place   -Ext: no LE edema, no rashes  -Neuro: A&O x 3, CN 2-12 intact, no focal deficits     Labs:                        11.2   6.17  )-----------( 205      ( 30 Dec 2022 07:45 )             37.3       12-30    134<L>  |  98  |  9   ----------------------------<  108<H>  3.8   |  29  |  0.46<L>    Ca    8.7      30 Dec 2022 07:45  Mg     1.9     12-30    TPro  6.6  /  Alb  3.1<L>  /  TBili  0.8  /  DBili  x   /  AST  44<H>  /  ALT  36  /  AlkPhos  66  12-30    Medications:  MEDICATIONS  (STANDING):  aMIOdarone    Tablet 200 milliGRAM(s) Oral daily  apixaban 5 milliGRAM(s) Oral two times a day  atorvastatin 20 milliGRAM(s) Oral at bedtime  benzonatate 100 milliGRAM(s) Oral three times a day  furosemide   Injectable 40 milliGRAM(s) IV Push three times a day  influenza  Vaccine (HIGH DOSE) 0.7 milliLiter(s) IntraMuscular once  ipratropium    for Nebulization 500 MICROGram(s) Nebulizer every 6 hours  levothyroxine 100 MICROGram(s) Oral daily  metoprolol succinate ER 25 milliGRAM(s) Oral daily  spironolactone 25 milliGRAM(s) Oral every 12 hours    MEDICATIONS  (PRN):  albuterol   0.5% 2.5 milliGRAM(s) Nebulizer every 6 hours PRN Shortness of Breath and/or Wheezing  guaiFENesin  milliGRAM(s) Oral every 12 hours PRN Cough  sodium chloride 0.65% Nasal 1 Spray(s) Both Nostrils daily PRN Nasal Congestion

## 2022-12-30 NOTE — PROGRESS NOTE ADULT - SUBJECTIVE AND OBJECTIVE BOX
Maliha Pedro is a  58 year old year-old patient presenting for follow up Posterior vitreous detachment (PVD) . History of Diabetes mellitus, Since her last exam she has had some blurring of her right eye, but no other changes.Noting that her blood sugars have been difficult to control.  Reports no new flashes and floaters     Optical Coherence Tomography 5-13-15  Right eye:  (was 261) good foveal contour, no cysts or subretinal fluid   Left eye  (was 266) good foveal contour, no cysts or subretinal fluid     Assessment & Plan:     1. Type 2 diabetes mellitus without retinopathy  -hgb a1c 8.8   6-14-17   Blood pressure (<120/80) and blood glucose (HbA1c <7.0) control discussed with patient. Patient advised that failure to adequately control each may lead to vision loss. The patient expressed understanding.    2. Glaucoma suspect, bilateral  - large cup to disc ratio R>L  - favorable pachmetry 583/613  - Octopus visual field   Right eye: Reliable,stable   Left eye: Reliable. Largely normal.   - OCT retinal nerve fiber layer 10/14/15   Stable both eyes   Follow up on October with dr. Rosario     3. Cataract, right eye  -early visually significant  -observe     4. Pseudophakia, left eye  -stable  -observe    5. Posterior vitreous detachment of right eye  -stable  -observe    6. Old inferior temporal small tear surrounded by pigment, no subretinal fluid right eye   Retina attached, no other tears  Retina detachment precautions were discussed with the patient (presence or increased in flashes, floaters or a curtain in the visual field) and was asked to return if any of the those occur    7. Dry eye syndrome  artificial tears  As needed and warm compresses      Follow up 1 yr sooner as needed       ~~~~~~~~~~~~~~~~~~~~~~~~~~~~~~~~~~   Complete documentation of historical and exam elements from today's encounter can be found in the full encounter summary report (not reduplicated in this progress note).   I personally obtained the chief complaint(s) and history of present illness.  I confirmed and edited as necessary the review of systems, past medical/surgical history, family history, social history, and examination findings as documented by others; and I examined the patient myself.  I personally reviewed the relevant tests, images, and reports as documented above.  I formulated and edited as necessary the assessment and plan and discussed the findings and management plan with the patient and family    Lesly Tracy MD  .  Retina Service   Department of Ophthalmology and Visual Neurosciences   [facility]  Phone: [phone]   Fax: [phone]                    Cardiology PA Adult Progress Note    Subjective Assessment:  	  MEDICATIONS:  aMIOdarone    Tablet 200 milliGRAM(s) Oral daily  furosemide   Injectable 40 milliGRAM(s) IV Push three times a day  metoprolol succinate ER 25 milliGRAM(s) Oral daily  spironolactone 25 milliGRAM(s) Oral every 12 hours  albuterol   0.5% 2.5 milliGRAM(s) Nebulizer every 6 hours PRN  benzonatate 100 milliGRAM(s) Oral three times a day  guaiFENesin  milliGRAM(s) Oral every 12 hours PRN  ipratropium    for Nebulization 500 MICROGram(s) Nebulizer every 6 hours  atorvastatin 20 milliGRAM(s) Oral at bedtime  levothyroxine 100 MICROGram(s) Oral daily  apixaban 5 milliGRAM(s) Oral two times a day  influenza  Vaccine (HIGH DOSE) 0.7 milliLiter(s) IntraMuscular once  sodium chloride 0.65% Nasal 1 Spray(s) Both Nostrils daily PRN  	    PHYSICAL EXAM:  TELEMETRY:  T(C): 36.5 (12-30-22 @ 06:13), Max: 36.7 (12-29-22 @ 22:16)  HR: 85 (12-30-22 @ 05:15) (74 - 94)  BP: 141/67 (12-30-22 @ 05:15) (96/54 - 141/67)  RR: 18 (12-30-22 @ 05:15) (16 - 18)  SpO2: 97% (12-30-22 @ 05:15) (93% - 99%)  Wt(kg): --  I&O's Summary    29 Dec 2022 07:01  -  30 Dec 2022 07:00  --------------------------------------------------------  IN: 800 mL / OUT: 2575 mL / NET: -1775 mL    PHYSICAL EXAM:  Appearance: Normal	  HEENT: Normal oral mucosa, PERRL, EOMI	  Neck: Supple, + JVD; no carotid Bruit   Cardiovascular: Normal S1 S2, No murmurs  Respiratory: very mild Rales of lower lung fields, Wheezing	  Gastrointestinal:  Soft, Non-tender, + BS	  Skin: No rashes, No ecchymoses, No cyanosis  Extremities: Normal range of motion, No clubbing, cyanosis; trace lower extremity edema  Vascular: Peripheral pulses palpable 2+ bilaterally  Neurologic: Non-focal  Psychiatry: A & O x 3, Mood & affect appropriate    LABS:	 	  CARDIAC MARKERS                       11.2   6.17  )-----------( 205      ( 30 Dec 2022 07:45 )             37.3     134<L>  |  98  |  9   ----------------------------<  108<H>  3.8   |  29  |  0.46<L>    Ca    8.7      30 Dec 2022 07:45    Mg     1.9     12-30    TPro  6.6  /  Alb  3.1<L>  /  TBili  0.8  /  DBili  x   /  AST  44<H>  /  ALT  36  /  AlkPhos  66  12-30       Cardiology PA Adult Progress Note    Subjective Assessment: Pt feels well this morning. A&O x3.   	  MEDICATIONS:  aMIOdarone    Tablet 200 milliGRAM(s) Oral daily  furosemide   Injectable 40 milliGRAM(s) IV Push three times a day  metoprolol succinate ER 25 milliGRAM(s) Oral daily  spironolactone 25 milliGRAM(s) Oral every 12 hours  albuterol   0.5% 2.5 milliGRAM(s) Nebulizer every 6 hours PRN  benzonatate 100 milliGRAM(s) Oral three times a day  guaiFENesin  milliGRAM(s) Oral every 12 hours PRN  ipratropium    for Nebulization 500 MICROGram(s) Nebulizer every 6 hours  atorvastatin 20 milliGRAM(s) Oral at bedtime  levothyroxine 100 MICROGram(s) Oral daily  apixaban 5 milliGRAM(s) Oral two times a day  influenza  Vaccine (HIGH DOSE) 0.7 milliLiter(s) IntraMuscular once  sodium chloride 0.65% Nasal 1 Spray(s) Both Nostrils daily PRN  	    PHYSICAL EXAM:  TELEMETRY: NSR, HR 80s.   T(C): 36.5 (12-30-22 @ 06:13), Max: 36.7 (12-29-22 @ 22:16)  HR: 85 (12-30-22 @ 05:15) (74 - 94)  BP: 141/67 (12-30-22 @ 05:15) (96/54 - 141/67)  RR: 18 (12-30-22 @ 05:15) (16 - 18)  SpO2: 97% (12-30-22 @ 05:15) (93% - 99%)  Wt(kg): --  I&O's Summary    29 Dec 2022 07:01  -  30 Dec 2022 07:00  --------------------------------------------------------  IN: 800 mL / OUT: 2575 mL / NET: -1775 mL    PHYSICAL EXAM:  Appearance: Normal	  HEENT: Normal oral mucosa, PERRL, EOMI	  Neck: Supple, + JVD; no carotid Bruit   Cardiovascular: Normal S1 S2, No murmurs  Respiratory: very mild Rales of lower lung fields, Wheezing	  Gastrointestinal:  Soft, Non-tender, + BS	  Skin: No rashes, No ecchymoses, No cyanosis  Extremities: Normal range of motion, No clubbing, cyanosis; trace lower extremity edema  Vascular: Peripheral pulses palpable 2+ bilaterally  Neurologic: Non-focal  Psychiatry: A & O x 3, Mood & affect appropriate    LABS:	 	  CARDIAC MARKERS                       11.2   6.17  )-----------( 205      ( 30 Dec 2022 07:45 )             37.3     134<L>  |  98  |  9   ----------------------------<  108<H>  3.8   |  29  |  0.46<L>    Ca    8.7      30 Dec 2022 07:45    Mg     1.9     12-30    TPro  6.6  /  Alb  3.1<L>  /  TBili  0.8  /  DBili  x   /  AST  44<H>  /  ALT  36  /  AlkPhos  66  12-30

## 2022-12-30 NOTE — PROGRESS NOTE ADULT - PROBLEM SELECTOR PLAN 7
--TBili elevated initially, now resolved.   --RUQ US (12/24/22): diffuse dilatation CBD and mild intrahepatic duct dilatation (greater than expected postcholecystectomy).   --GI consulted initially for concern for enterovesicular fistula (imaging negative for this); no need to pursue MRCP in absence of clinical symptoms / resolved elevated LT's - can f/u w/ GI outpatient.

## 2022-12-30 NOTE — PROGRESS NOTE ADULT - PROBLEM SELECTOR PLAN 8
--TFTs WNL.   --CONT: Levothyroxine 100mcg PO QD.       DVT ppx: Eliquis  Dispo: JENNIE ([facility] accepted); IV diuresis over weekend likely discharge Monday 1/2/22

## 2022-12-30 NOTE — PROGRESS NOTE ADULT - PROBLEM SELECTOR PLAN 2
--Presented w/ AFib w/ RVR; now s/p DCCV 12/27/22 now in NSR.   --s/p Amiodarone gtt, now on PO.   --CONT: Eliquis 5mg PO BID, Amiodarone 200mg PO QD, and Toprol 25mg PO QD.   --Pt to f/u w/ EP Dr. La in 6wks post discharge.

## 2022-12-31 LAB
ALBUMIN SERPL ELPH-MCNC: 3.1 G/DL — LOW (ref 3.3–5)
ALP SERPL-CCNC: 69 U/L — SIGNIFICANT CHANGE UP (ref 40–120)
ALT FLD-CCNC: 32 U/L — SIGNIFICANT CHANGE UP (ref 10–45)
ANION GAP SERPL CALC-SCNC: 8 MMOL/L — SIGNIFICANT CHANGE UP (ref 5–17)
AST SERPL-CCNC: 38 U/L — SIGNIFICANT CHANGE UP (ref 10–40)
BILIRUB SERPL-MCNC: 1 MG/DL — SIGNIFICANT CHANGE UP (ref 0.2–1.2)
BUN SERPL-MCNC: 10 MG/DL — SIGNIFICANT CHANGE UP (ref 7–23)
CALCIUM SERPL-MCNC: 8.7 MG/DL — SIGNIFICANT CHANGE UP (ref 8.4–10.5)
CHLORIDE SERPL-SCNC: 92 MMOL/L — LOW (ref 96–108)
CO2 SERPL-SCNC: 29 MMOL/L — SIGNIFICANT CHANGE UP (ref 22–31)
CREAT SERPL-MCNC: 0.39 MG/DL — LOW (ref 0.5–1.3)
EGFR: 92 ML/MIN/1.73M2 — SIGNIFICANT CHANGE UP
GLUCOSE SERPL-MCNC: 113 MG/DL — HIGH (ref 70–99)
HCT VFR BLD CALC: 38.2 % — SIGNIFICANT CHANGE UP (ref 34.5–45)
HGB BLD-MCNC: 11.8 G/DL — SIGNIFICANT CHANGE UP (ref 11.5–15.5)
MAGNESIUM SERPL-MCNC: 1.9 MG/DL — SIGNIFICANT CHANGE UP (ref 1.6–2.6)
MCHC RBC-ENTMCNC: 24.3 PG — LOW (ref 27–34)
MCHC RBC-ENTMCNC: 30.9 GM/DL — LOW (ref 32–36)
MCV RBC AUTO: 78.8 FL — LOW (ref 80–100)
NRBC # BLD: 0 /100 WBCS — SIGNIFICANT CHANGE UP (ref 0–0)
PHOSPHATE SERPL-MCNC: 2.6 MG/DL — SIGNIFICANT CHANGE UP (ref 2.5–4.5)
PLATELET # BLD AUTO: 220 K/UL — SIGNIFICANT CHANGE UP (ref 150–400)
POTASSIUM SERPL-MCNC: 3.9 MMOL/L — SIGNIFICANT CHANGE UP (ref 3.5–5.3)
POTASSIUM SERPL-SCNC: 3.9 MMOL/L — SIGNIFICANT CHANGE UP (ref 3.5–5.3)
PROT SERPL-MCNC: 7 G/DL — SIGNIFICANT CHANGE UP (ref 6–8.3)
RBC # BLD: 4.85 M/UL — SIGNIFICANT CHANGE UP (ref 3.8–5.2)
RBC # FLD: 17.9 % — HIGH (ref 10.3–14.5)
SODIUM SERPL-SCNC: 129 MMOL/L — LOW (ref 135–145)
WBC # BLD: 5.97 K/UL — SIGNIFICANT CHANGE UP (ref 3.8–10.5)
WBC # FLD AUTO: 5.97 K/UL — SIGNIFICANT CHANGE UP (ref 3.8–10.5)

## 2022-12-31 PROCEDURE — 99232 SBSQ HOSP IP/OBS MODERATE 35: CPT

## 2022-12-31 PROCEDURE — 99233 SBSQ HOSP IP/OBS HIGH 50: CPT

## 2022-12-31 RX ORDER — LANOLIN ALCOHOL/MO/W.PET/CERES
5 CREAM (GRAM) TOPICAL AT BEDTIME
Refills: 0 | Status: DISCONTINUED | OUTPATIENT
Start: 2022-12-31 | End: 2023-01-03

## 2022-12-31 RX ADMIN — APIXABAN 5 MILLIGRAM(S): 2.5 TABLET, FILM COATED ORAL at 17:42

## 2022-12-31 RX ADMIN — Medication 500 MICROGRAM(S): at 17:43

## 2022-12-31 RX ADMIN — Medication 500 MICROGRAM(S): at 11:42

## 2022-12-31 RX ADMIN — Medication 25 MILLIGRAM(S): at 05:32

## 2022-12-31 RX ADMIN — Medication 500 MICROGRAM(S): at 00:00

## 2022-12-31 RX ADMIN — Medication 5 MILLIGRAM(S): at 21:02

## 2022-12-31 RX ADMIN — Medication 100 MILLIGRAM(S): at 05:32

## 2022-12-31 RX ADMIN — SPIRONOLACTONE 25 MILLIGRAM(S): 25 TABLET, FILM COATED ORAL at 06:00

## 2022-12-31 RX ADMIN — Medication 500 MICROGRAM(S): at 23:07

## 2022-12-31 RX ADMIN — Medication 40 MILLIGRAM(S): at 17:42

## 2022-12-31 RX ADMIN — Medication 5 MILLIGRAM(S): at 00:00

## 2022-12-31 RX ADMIN — APIXABAN 5 MILLIGRAM(S): 2.5 TABLET, FILM COATED ORAL at 05:32

## 2022-12-31 RX ADMIN — Medication 100 MILLIGRAM(S): at 14:27

## 2022-12-31 RX ADMIN — Medication 40 MILLIGRAM(S): at 06:00

## 2022-12-31 RX ADMIN — ATORVASTATIN CALCIUM 20 MILLIGRAM(S): 80 TABLET, FILM COATED ORAL at 21:01

## 2022-12-31 RX ADMIN — Medication 100 MICROGRAM(S): at 05:33

## 2022-12-31 RX ADMIN — Medication 500 MICROGRAM(S): at 06:00

## 2022-12-31 RX ADMIN — Medication 100 MILLIGRAM(S): at 21:02

## 2022-12-31 RX ADMIN — SPIRONOLACTONE 25 MILLIGRAM(S): 25 TABLET, FILM COATED ORAL at 17:43

## 2022-12-31 RX ADMIN — AMIODARONE HYDROCHLORIDE 200 MILLIGRAM(S): 400 TABLET ORAL at 05:32

## 2022-12-31 NOTE — PROGRESS NOTE ADULT - PROBLEM SELECTOR PLAN 1
--Volume status improving w/ LE edema much improved. Very faint rales and appreciate JVD on exam. SpO2 96% on 2L NC.   --TTE (12/21/22): LVEF 55-60%, mod-severe LVH, ESTER, mild AR (bioprosth AV), mod MR, mod-severe TR, PASP 79.   --CXR (12/28/22) w/ persistent pulm congestion and B/L pleural effusion.   --I/O: -14.4L/total, -1.7L/24hrs.   --CONT: Lasix 40mg IV BID (decreased from BID today 12/30/22), Spironolactone 25mg PO BID. Plan to try to deescalate over the weekend got IV BID 12/31  --Holding home Valsartan due to labile BPs.

## 2022-12-31 NOTE — PROGRESS NOTE ADULT - SUBJECTIVE AND OBJECTIVE BOX
INCOMPLETE Opening note for attending     OVERNIGHT EVENTS:  No acute events overnight.    SUBJECTIVE / INTERVAL HPI: Patient seen and examined at bedside.    Denies CP, SOB, dizziness/lightheadedness, palpitations    12 point ROS otherwise negative    VITAL SIGNS:  Vital Signs Last 24 Hrs  T(C): 36.1 (31 Dec 2022 10:00), Max: 37.3 (30 Dec 2022 17:56)  T(F): 96.9 (31 Dec 2022 10:00), Max: 99.1 (30 Dec 2022 17:56)  HR: 83 (31 Dec 2022 08:43) (83 - 96)  BP: 111/56 (31 Dec 2022 08:43) (101/55 - 144/65)  BP(mean): 83 (30 Dec 2022 16:40) (74 - 83)  RR: 17 (31 Dec 2022 08:43) (16 - 18)  SpO2: 99% (31 Dec 2022 08:43) (91% - 100%)    Parameters below as of 31 Dec 2022 08:43  Patient On (Oxygen Delivery Method): nasal cannula  O2 Flow (L/min): 1        I&O's Summary    30 Dec 2022 07:01  -  31 Dec 2022 07:00  --------------------------------------------------------  IN: 640 mL / OUT: 2010 mL / NET: -1370 mL    31 Dec 2022 07:01  -  31 Dec 2022 10:31  --------------------------------------------------------  IN: 180 mL / OUT: 0 mL / NET: 180 mL          PHYSICAL EXAM:    General: sitting up in bed, NAD  HEENT: conjunctiva clear; MMM  Neck: supple, no JVD  Cardiovascular: RRR, no murmurs  Respiratory: CTA B/L  Gastrointestinal: soft, NT/ND, +BS  Extremities: WWP, no edema or cyanosis  Vascular: Peripheral pulses palpable 2+ bilaterally/ carotid 2+ b/l, no bruits; radial 2+ b/l; femoral 2+ b/l no bruits; DP/PT 2+ b/l  Neurological: AAOx3, no focal deficits    MEDICATIONS:  MEDICATIONS  (STANDING):  aMIOdarone    Tablet 200 milliGRAM(s) Oral daily  apixaban 5 milliGRAM(s) Oral two times a day  atorvastatin 20 milliGRAM(s) Oral at bedtime  benzonatate 100 milliGRAM(s) Oral three times a day  furosemide   Injectable 40 milliGRAM(s) IV Push every 12 hours  influenza  Vaccine (HIGH DOSE) 0.7 milliLiter(s) IntraMuscular once  ipratropium    for Nebulization 500 MICROGram(s) Nebulizer every 6 hours  levothyroxine 100 MICROGram(s) Oral daily  metoprolol succinate ER 25 milliGRAM(s) Oral daily  spironolactone 25 milliGRAM(s) Oral every 12 hours    MEDICATIONS  (PRN):  albuterol   0.5% 2.5 milliGRAM(s) Nebulizer every 6 hours PRN Shortness of Breath and/or Wheezing  guaiFENesin  milliGRAM(s) Oral every 12 hours PRN Cough  sodium chloride 0.65% Nasal 1 Spray(s) Both Nostrils daily PRN Nasal Congestion      LABS:                        11.8   5.97  )-----------( 220      ( 31 Dec 2022 07:20 )             38.2       12-31    129<L>  |  92<L>  |  10  ----------------------------<  113<H>  3.9   |  29  |  0.39<L>    Ca    8.7      31 Dec 2022 07:20  Phos  2.6     12-31  Mg     1.9     12-31    TPro  7.0  /  Alb  3.1<L>  /  TBili  1.0  /  DBili  x   /  AST  38  /  ALT  32  /  AlkPhos  69  12-31                TELEMETRY:    RADIOLOGY & ADDITIONAL TESTS: Reviewed. OVERNIGHT EVENTS:  No acute events overnight.    SUBJECTIVE / INTERVAL HPI: Patient seen and examined at bedside.    Denies CP, SOB, dizziness/lightheadedness, palpitations    12 point ROS otherwise negative    VITAL SIGNS:  Vital Signs Last 24 Hrs  T(C): 36.1 (31 Dec 2022 10:00), Max: 37.3 (30 Dec 2022 17:56)  T(F): 96.9 (31 Dec 2022 10:00), Max: 99.1 (30 Dec 2022 17:56)  HR: 83 (31 Dec 2022 08:43) (83 - 96)  BP: 111/56 (31 Dec 2022 08:43) (101/55 - 144/65)  BP(mean): 83 (30 Dec 2022 16:40) (74 - 83)  RR: 17 (31 Dec 2022 08:43) (16 - 18)  SpO2: 99% (31 Dec 2022 08:43) (91% - 100%)    Parameters below as of 31 Dec 2022 08:43  Patient On (Oxygen Delivery Method): nasal cannula  O2 Flow (L/min): 1        I&O's Summary    30 Dec 2022 07:01  -  31 Dec 2022 07:00  --------------------------------------------------------  IN: 640 mL / OUT: 2010 mL / NET: -1370 mL    31 Dec 2022 07:01  -  31 Dec 2022 10:31  --------------------------------------------------------  IN: 180 mL / OUT: 0 mL / NET: 180 mL          PHYSICAL EXAM:    General: sitting up in bed, NAD  HEENT: conjunctiva clear; MMM  Neck: supple, no JVD  Cardiovascular: RRR, no murmurs  Respiratory: CTA B/L  Gastrointestinal: soft, NT/ND, +BS  Extremities: WWP, no edema or cyanosis  Vascular: Peripheral pulses palpable 2+ bilaterally/ carotid 2+ b/l, no bruits; radial 2+ b/l; femoral 2+ b/l no bruits; DP/PT 2+ b/l  Neurological: AAOx3, no focal deficits    MEDICATIONS:  MEDICATIONS  (STANDING):  aMIOdarone    Tablet 200 milliGRAM(s) Oral daily  apixaban 5 milliGRAM(s) Oral two times a day  atorvastatin 20 milliGRAM(s) Oral at bedtime  benzonatate 100 milliGRAM(s) Oral three times a day  furosemide   Injectable 40 milliGRAM(s) IV Push every 12 hours  influenza  Vaccine (HIGH DOSE) 0.7 milliLiter(s) IntraMuscular once  ipratropium    for Nebulization 500 MICROGram(s) Nebulizer every 6 hours  levothyroxine 100 MICROGram(s) Oral daily  metoprolol succinate ER 25 milliGRAM(s) Oral daily  spironolactone 25 milliGRAM(s) Oral every 12 hours    MEDICATIONS  (PRN):  albuterol   0.5% 2.5 milliGRAM(s) Nebulizer every 6 hours PRN Shortness of Breath and/or Wheezing  guaiFENesin  milliGRAM(s) Oral every 12 hours PRN Cough  sodium chloride 0.65% Nasal 1 Spray(s) Both Nostrils daily PRN Nasal Congestion      LABS:                        11.8   5.97  )-----------( 220      ( 31 Dec 2022 07:20 )             38.2       12-31    129<L>  |  92<L>  |  10  ----------------------------<  113<H>  3.9   |  29  |  0.39<L>    Ca    8.7      31 Dec 2022 07:20  Phos  2.6     12-31  Mg     1.9     12-31    TPro  7.0  /  Alb  3.1<L>  /  TBili  1.0  /  DBili  x   /  AST  38  /  ALT  32  /  AlkPhos  69  12-31                TELEMETRY:    RADIOLOGY & ADDITIONAL TESTS: Reviewed.

## 2022-12-31 NOTE — PROGRESS NOTE ADULT - SUBJECTIVE AND OBJECTIVE BOX
Subjective:  No acute events overnight.  Patient is doing well today without new complaints.  Denies HA, CP, SOB, abdominal pain, nausea, vomiting, fever, chills or diarrhea.     Objective:   Vital Signs:  T(C): 36.3 (31 Dec 2022 14:02), Max: 37.3 (30 Dec 2022 17:56)  T(F): 97.3 (31 Dec 2022 14:02), Max: 99.1 (30 Dec 2022 17:56)  HR: 80 (31 Dec 2022 11:52) (80 - 96)  BP: 109/58 (31 Dec 2022 11:52) (109/58 - 144/65)  BP(mean): 83 (30 Dec 2022 16:40) (83 - 83)  RR: 17 (31 Dec 2022 11:52) (16 - 18)  SpO2: 99% (31 Dec 2022 11:52) (91% - 100%)    Parameters below as of 31 Dec 2022 11:52  Patient On (Oxygen Delivery Method): nasal cannula  O2 Flow (L/min): 2    Physical Exam:   -Gen: NAD, resting in bed, pleasant and cooperative  -HEENT: EOMI, PERRL, no scleral icterus, no JVD, no bruits  -CV: normal S1 and S2, no murmurs appreciated   -Lungs: CTABL, normal respiratory effort on NC  -Ab: soft, NT, ND, normal BS  -: soto in place   -Ext: no LE edema, no rashes  -Neuro: A&O x 3, CN 2-12 intact, no focal deficits     Labs:                        11.8   5.97  )-----------( 220      ( 31 Dec 2022 07:20 )             38.2       12-31    129<L>  |  92<L>  |  10  ----------------------------<  113<H>  3.9   |  29  |  0.39<L>    Ca    8.7      31 Dec 2022 07:20  Phos  2.6     12-31  Mg     1.9     12-31    TPro  7.0  /  Alb  3.1<L>  /  TBili  1.0  /  DBili  x   /  AST  38  /  ALT  32  /  AlkPhos  69  12-31     Medications:  MEDICATIONS  (STANDING):  aMIOdarone    Tablet 200 milliGRAM(s) Oral daily  apixaban 5 milliGRAM(s) Oral two times a day  atorvastatin 20 milliGRAM(s) Oral at bedtime  benzonatate 100 milliGRAM(s) Oral three times a day  furosemide   Injectable 40 milliGRAM(s) IV Push every 12 hours  influenza  Vaccine (HIGH DOSE) 0.7 milliLiter(s) IntraMuscular once  ipratropium    for Nebulization 500 MICROGram(s) Nebulizer every 6 hours  levothyroxine 100 MICROGram(s) Oral daily  metoprolol succinate ER 25 milliGRAM(s) Oral daily  spironolactone 25 milliGRAM(s) Oral every 12 hours    MEDICATIONS  (PRN):  albuterol   0.5% 2.5 milliGRAM(s) Nebulizer every 6 hours PRN Shortness of Breath and/or Wheezing  guaiFENesin  milliGRAM(s) Oral every 12 hours PRN Cough  sodium chloride 0.65% Nasal 1 Spray(s) Both Nostrils daily PRN Nasal Congestion

## 2022-12-31 NOTE — PROGRESS NOTE ADULT - ASSESSMENT
89F w/ PMHx HTN, HLD, AF on Eliquis (remote h/o ablation >10yrs ago), HFpEF, s/p bioprosthetic AVR, Hypothyroidism and TIA, admitted to cardiac tele for further management of acute decompensated CHF and AF w/ RVR. Pt now s/p successful DCCV. Hospital course c/b UTI, s/p abx now resolved. Continue IV diuresis over the weekend and likely clear for discharge parminder next week (dispo JENNIE - accepted at [facility] rehab). 89F w/ PMHx HTN, HLD, AF on Eliquis (remote h/o ablation >10yrs ago), HFpEF, s/p bioprosthetic AVR, Hypothyroidism and TIA, admitted to cardiac tele for further management of acute decompensated CHF and AF w/ RVR. Pt now s/p successful DCCV. Hospital course c/b UTI, s/p abx now resolved. Continue IV diuresis over the weekend and likely clear for discharge parminder next week (dispo JENNIE - accepted at [facility] rehab). See if De-escalate tomr 1/1 to PO Lasix or QD IV Lasix

## 2022-12-31 NOTE — PROGRESS NOTE ADULT - PROBLEM SELECTOR PLAN 6
--H/H stable; Hgb ~10-11s, no active signs of bleeding.   --Iron studies c/w CARRILLO; initiated IV iron eventually but stopped once pt w/ infection.  --Restarted PO Iron supplementation.

## 2022-12-31 NOTE — PROGRESS NOTE ADULT - ASSESSMENT
89-year-old female with a PMHx of HTN, HLD, hypothyroidism, TIA, history of AVR, Afib (s/p remote ablation, on Eliquis) and CHF who presented with acute of chronic decompensated heart failure and Afib with RVR.             #Congestive Heart Failure         -further management as per primary team          -TTE (12/21): EF 55-60%, moderate to severe LVH, moderate to severe TR and pHTN        -further diuresis as per primary team, currently on IV lasix 40mg BID        -currently on metoprolol succinate 25mg daily and spironolactone 25mg BID    -valsartan held 2/2 labile BPs as per primary team             #Atrial Fibrillation with RVR         -further management as per primary team and EP, s/p DCCV on 12/27     -continue with Eliquis 5mg BID       -currently on amiodarone 200mg daily and metoprolol succinate 25mg daily       #UTI        -s/p 3-day course of CTX, (12/26 - 12/28)       #Urinary Retention       -failed TOV, will need outpatient urology follow up vs. 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       -CT-A/P without evidence of fistula, ACS consulted, no surgical intervention indicated at this time         #Dilated Common Bile Duct and Intrahepatic Biliary Ductal Dilation        #Elevated Bilirubin (resolved)      -incidental finding on CT-A/P, GI consulted, plan for outpatient follow up        #Hypothyroidism          -continue with levothyroxine 100mcg daily            #Microcytic Anemia          -iron studies most consistent with CARRILLO, resume ferrous sulfate 325mg daily upon discharge         #Hyponatremia          -mild and asymptomatic, very likely hypervolemic in setting of ADHF           #Pre-Diabetes (A1c 6.4%)         -no history of DMII and is not on any medications         -outpatient PCP follow up           #HTN        -continue with spironolactone 25mg BID and metoprolol succinate 25mg daily     -valsartan 40mg daily held due to labile BPs          #HLD         -continue with atorvastatin 20mg daily            #Cirrhosis       -incidental finding on CT-A/P, no current evidence for decompensated liver disease, outpatient GI follow up        DVT PPx: Eliquis         Dispo: [facility], not medically ready

## 2022-12-31 NOTE — PROGRESS NOTE ADULT - PROBLEM SELECTOR PLAN 8
--TFTs WNL.   --CONT: Levothyroxine 100mcg PO QD.       DVT ppx: Eliquis  Dispo: JENNIE ([facility] accepted); IV diuresis over weekend likely discharge Monday 1/2/22

## 2023-01-01 DIAGNOSIS — I50.33 ACUTE ON CHRONIC DIASTOLIC (CONGESTIVE) HEART FAILURE: ICD-10-CM

## 2023-01-01 LAB
ANION GAP SERPL CALC-SCNC: 8 MMOL/L — SIGNIFICANT CHANGE UP (ref 5–17)
BUN SERPL-MCNC: 10 MG/DL — SIGNIFICANT CHANGE UP (ref 7–23)
CALCIUM SERPL-MCNC: 8.8 MG/DL — SIGNIFICANT CHANGE UP (ref 8.4–10.5)
CHLORIDE SERPL-SCNC: 90 MMOL/L — LOW (ref 96–108)
CO2 SERPL-SCNC: 30 MMOL/L — SIGNIFICANT CHANGE UP (ref 22–31)
CREAT SERPL-MCNC: 0.39 MG/DL — LOW (ref 0.5–1.3)
EGFR: 92 ML/MIN/1.73M2 — SIGNIFICANT CHANGE UP
GLUCOSE SERPL-MCNC: 125 MG/DL — HIGH (ref 70–99)
HCT VFR BLD CALC: 38.5 % — SIGNIFICANT CHANGE UP (ref 34.5–45)
HGB BLD-MCNC: 12.1 G/DL — SIGNIFICANT CHANGE UP (ref 11.5–15.5)
MAGNESIUM SERPL-MCNC: 1.7 MG/DL — SIGNIFICANT CHANGE UP (ref 1.6–2.6)
MCHC RBC-ENTMCNC: 24.4 PG — LOW (ref 27–34)
MCHC RBC-ENTMCNC: 31.4 GM/DL — LOW (ref 32–36)
MCV RBC AUTO: 77.8 FL — LOW (ref 80–100)
NRBC # BLD: 0 /100 WBCS — SIGNIFICANT CHANGE UP (ref 0–0)
PLATELET # BLD AUTO: 233 K/UL — SIGNIFICANT CHANGE UP (ref 150–400)
POTASSIUM SERPL-MCNC: 4 MMOL/L — SIGNIFICANT CHANGE UP (ref 3.5–5.3)
POTASSIUM SERPL-SCNC: 4 MMOL/L — SIGNIFICANT CHANGE UP (ref 3.5–5.3)
RBC # BLD: 4.95 M/UL — SIGNIFICANT CHANGE UP (ref 3.8–5.2)
RBC # FLD: 17.6 % — HIGH (ref 10.3–14.5)
SODIUM SERPL-SCNC: 128 MMOL/L — LOW (ref 135–145)
WBC # BLD: 5.74 K/UL — SIGNIFICANT CHANGE UP (ref 3.8–10.5)
WBC # FLD AUTO: 5.74 K/UL — SIGNIFICANT CHANGE UP (ref 3.8–10.5)

## 2023-01-01 PROCEDURE — 99232 SBSQ HOSP IP/OBS MODERATE 35: CPT

## 2023-01-01 RX ORDER — FUROSEMIDE 40 MG
40 TABLET ORAL DAILY
Refills: 0 | Status: DISCONTINUED | OUTPATIENT
Start: 2023-01-02 | End: 2023-01-02

## 2023-01-01 RX ORDER — FUROSEMIDE 40 MG
40 TABLET ORAL ONCE
Refills: 0 | Status: COMPLETED | OUTPATIENT
Start: 2023-01-01 | End: 2023-01-01

## 2023-01-01 RX ORDER — MAGNESIUM SULFATE 500 MG/ML
1 VIAL (ML) INJECTION ONCE
Refills: 0 | Status: COMPLETED | OUTPATIENT
Start: 2023-01-01 | End: 2023-01-01

## 2023-01-01 RX ADMIN — Medication 100 MILLIGRAM(S): at 05:27

## 2023-01-01 RX ADMIN — Medication 100 GRAM(S): at 11:16

## 2023-01-01 RX ADMIN — Medication 25 MILLIGRAM(S): at 05:28

## 2023-01-01 RX ADMIN — APIXABAN 5 MILLIGRAM(S): 2.5 TABLET, FILM COATED ORAL at 19:17

## 2023-01-01 RX ADMIN — ATORVASTATIN CALCIUM 20 MILLIGRAM(S): 80 TABLET, FILM COATED ORAL at 23:21

## 2023-01-01 RX ADMIN — Medication 100 MILLIGRAM(S): at 23:21

## 2023-01-01 RX ADMIN — AMIODARONE HYDROCHLORIDE 200 MILLIGRAM(S): 400 TABLET ORAL at 05:27

## 2023-01-01 RX ADMIN — Medication 500 MICROGRAM(S): at 05:28

## 2023-01-01 RX ADMIN — Medication 100 MICROGRAM(S): at 05:28

## 2023-01-01 RX ADMIN — Medication 100 MILLIGRAM(S): at 14:13

## 2023-01-01 RX ADMIN — APIXABAN 5 MILLIGRAM(S): 2.5 TABLET, FILM COATED ORAL at 05:27

## 2023-01-01 RX ADMIN — Medication 500 MICROGRAM(S): at 11:16

## 2023-01-01 RX ADMIN — Medication 500 MICROGRAM(S): at 23:21

## 2023-01-01 RX ADMIN — Medication 5 MILLIGRAM(S): at 23:21

## 2023-01-01 RX ADMIN — Medication 500 MICROGRAM(S): at 19:17

## 2023-01-01 RX ADMIN — SPIRONOLACTONE 25 MILLIGRAM(S): 25 TABLET, FILM COATED ORAL at 06:23

## 2023-01-01 RX ADMIN — Medication 40 MILLIGRAM(S): at 06:23

## 2023-01-01 RX ADMIN — SPIRONOLACTONE 25 MILLIGRAM(S): 25 TABLET, FILM COATED ORAL at 19:17

## 2023-01-01 NOTE — PROGRESS NOTE ADULT - SUBJECTIVE AND OBJECTIVE BOX
Interventional Cardiology PA Adult Progress Note    Subjective Assessment: Patient seen and examined at bedside. Patient pleasant, laying in bed, without complaints at this time.     ROS Negative except as per Subjective and HPI  	  MEDICATIONS:  aMIOdarone    Tablet 200 milliGRAM(s) Oral daily  metoprolol succinate ER 25 milliGRAM(s) Oral daily  spironolactone 25 milliGRAM(s) Oral every 12 hours  albuterol   0.5% 2.5 milliGRAM(s) Nebulizer every 6 hours PRN  benzonatate 100 milliGRAM(s) Oral three times a day  guaiFENesin  milliGRAM(s) Oral every 12 hours PRN  ipratropium    for Nebulization 500 MICROGram(s) Nebulizer every 6 hours  melatonin 5 milliGRAM(s) Oral at bedtime  atorvastatin 20 milliGRAM(s) Oral at bedtime  levothyroxine 100 MICROGram(s) Oral daily  apixaban 5 milliGRAM(s) Oral two times a day  influenza  Vaccine (HIGH DOSE) 0.7 milliLiter(s) IntraMuscular once  sodium chloride 0.65% Nasal 1 Spray(s) Both Nostrils daily PRN      [PHYSICAL EXAM:  TELEMETRY:  T(C): 36.2 (01-01-23 @ 13:30), Max: 37.2 (12-31-22 @ 18:33)  HR: 80 (01-01-23 @ 11:21) (77 - 94)  BP: 127/61 (01-01-23 @ 11:21) (119/63 - 147/65)  RR: 17 (01-01-23 @ 11:21) (16 - 18)  SpO2: 99% (01-01-23 @ 11:21) (98% - 100%)  Wt(kg): --  I&O's Summary    31 Dec 2022 07:01  -  01 Jan 2023 07:00  --------------------------------------------------------  IN: 480 mL / OUT: 2750 mL / NET: -2270 mL    01 Jan 2023 07:01  -  01 Jan 2023 14:31  --------------------------------------------------------  IN: 360 mL / OUT: 1525 mL / NET: -1165 mL                                 Appearance: Normal, NAD   HEENT:   Normal oral mucosa, PERRL, EOMI	  Neck: Supple,  - JVD;  no Carotid Bruit   Cardiovascular: Normal S1 S2, No JVD, No murmurs,   Respiratory: Lungs clear to auscultation	  Gastrointestinal:  Soft, Non-tender, + BS	  Skin: No rashes, No ecchymoses, No cyanosis  Extremities: Normal range of motion, No clubbing, cyanosis or edema  Vascular: Peripheral pulses palpable 2+ bilaterally  Neurologic: Non-focal  Psychiatry: A & O x 3, Mood & affect appropriate      DIAGNOSTIC TESTING:  [x ] Echocardiogram 12/21/22:  1. Patient was tachycardic during the study.   2. Normal left ventricular systolic function. Left ventricular ejection   fraction is 55-60%.   3. Presence of atrial fibrillation lends to [id number] variability in   the ejection fraction.   4. Moderate to severe symmetric left ventricular hypertrophy.   5. Normal right ventricular size and systolic function.   6. Biatrial enlargement.   7. 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 The leaflets   appear thickened. The peak transvalvular velocity is 3.08 m/s, the mean   transvalvular gradient is 22.00 mmHg, and the LVOT/AV velocity ratio is   0.33. The values are borderline elevated for aortic bioprosthesis.   8. There is mild aortic regurgitation.   9. There is severe mitral annular calcification. The mean transvalvular   gradient is 5.00 mmHg at a heart rate of 114 bpm.  10. Moderate mitral regurgitation.  11. Moderate-to-severe tricuspid regurgitation.  12. Pulmonary hypertension present, pulmonary artery systolic pressure is   79 mmHg.  13. Trivial pericardial effusion without echocardiographic evidence of   cardiac tamponade physiology.  14. Left pleural effusion.  15. No prior echo is available for comparison.                      12.1   5.74  )-----------( 233      ( 01 Jan 2023 07:09 )             38.5     01-01    128<L>  |  90<L>  |  10  ----------------------------<  125<H>  4.0   |  30  |  0.39<L>    Ca    8.8      01 Jan 2023 07:09  Phos  2.6     12-31  Mg     1.7     01-01    TPro  7.0  /  Alb  3.1<L>  /  TBili  1.0  /  DBili  x   /  AST  38  /  ALT  32  /  AlkPhos  69  12-31

## 2023-01-01 NOTE — PROGRESS NOTE ADULT - ASSESSMENT
89F w/ PMHx HTN, HLD, AF on Eliquis (remote h/o ablation >10yrs ago), HFpEF, s/p bioprosthetic AVR, Hypothyroidism and TIA, admitted to cardiac tele for further management of acute decompensated CHF and AF w/ RVR. Pt now s/p successful DCCV. Hospital course c/b UTI, s/p abx now resolved. S/p IV diuresis over the weekend and awaiting JENNIE (accepted at [facility] rehab)

## 2023-01-01 NOTE — PROGRESS NOTE ADULT - SUBJECTIVE AND OBJECTIVE BOX
Subjective:  No acute events overnight.  Patient is doing well today without new complaints.  Denies HA, CP, SOB, abdominal pain, nausea, vomiting, fever, chills or diarrhea.     Objective:   Vital Signs:  T(C): 36.2 (01 Jan 2023 13:30), Max: 37.2 (31 Dec 2022 18:33)  T(F): 97.2 (01 Jan 2023 13:30), Max: 99 (31 Dec 2022 18:33)  HR: 80 (01 Jan 2023 11:21) (77 - 94)  BP: 127/61 (01 Jan 2023 11:21) (119/63 - 147/65)  BP(mean): --  RR: 17 (01 Jan 2023 11:21) (16 - 18)  SpO2: 99% (01 Jan 2023 11:21) (98% - 100%)    Parameters below as of 01 Jan 2023 11:21  Patient On (Oxygen Delivery Method): nasal cannula  O2 Flow (L/min): 2    Physical Exam:   -Gen: NAD, resting in bed, pleasant and cooperative  -HEENT: EOMI, PERRL, no scleral icterus, no JVD, no bruits  -CV: normal S1 and S2, no murmurs appreciated   -Lungs: CTABL, normal respiratory effort on NC  -Ab: soft, NT, ND, normal BS  -Ext: no LE edema, no rashes  -Neuro: A&O x 3, CN 2-12 intact, no focal deficits     Labs:                        12.1   5.74  )-----------( 233      ( 01 Jan 2023 07:09 )             38.5       01-01    128<L>  |  90<L>  |  10  ----------------------------<  125<H>  4.0   |  30  |  0.39<L>    Ca    8.8      01 Jan 2023 07:09  Phos  2.6     12-31  Mg     1.7     01-01    TPro  7.0  /  Alb  3.1<L>  /  TBili  1.0  /  DBili  x   /  AST  38  /  ALT  32  /  AlkPhos  69  12-31     Medications:  MEDICATIONS  (STANDING):  aMIOdarone    Tablet 200 milliGRAM(s) Oral daily  apixaban 5 milliGRAM(s) Oral two times a day  atorvastatin 20 milliGRAM(s) Oral at bedtime  benzonatate 100 milliGRAM(s) Oral three times a day  furosemide   Injectable 40 milliGRAM(s) IV Push once  influenza  Vaccine (HIGH DOSE) 0.7 milliLiter(s) IntraMuscular once  ipratropium    for Nebulization 500 MICROGram(s) Nebulizer every 6 hours  levothyroxine 100 MICROGram(s) Oral daily  melatonin 5 milliGRAM(s) Oral at bedtime  metoprolol succinate ER 25 milliGRAM(s) Oral daily  spironolactone 25 milliGRAM(s) Oral every 12 hours    MEDICATIONS  (PRN):  albuterol   0.5% 2.5 milliGRAM(s) Nebulizer every 6 hours PRN Shortness of Breath and/or Wheezing  guaiFENesin  milliGRAM(s) Oral every 12 hours PRN Cough  sodium chloride 0.65% Nasal 1 Spray(s) Both Nostrils daily PRN Nasal Congestion

## 2023-01-01 NOTE — PROGRESS NOTE ADULT - PROBLEM SELECTOR PLAN 8
--TFTs WNL.   --CONT: Levothyroxine 100mcg PO QD.       DVT ppx: Eliquis  Dispo: JENNIE ([facility] accepted)

## 2023-01-01 NOTE — PROGRESS NOTE ADULT - PROBLEM SELECTOR PLAN 2
Presented w/ AFib w/ RVR; now s/p DCCV 12/27/22 now in NSR.   --s/p Amiodarone gtt, now on PO.   --CONT: Eliquis 5mg PO BID, Amiodarone 200mg PO QD, and Toprol 25mg PO QD.   --Pt to f/u w/ EP Dr. La in 6wks post discharge.

## 2023-01-01 NOTE — PROGRESS NOTE ADULT - ASSESSMENT
89-year-old female with a PMHx of HTN, HLD, hypothyroidism, TIA, history of AVR, Afib (s/p remote ablation, on Eliquis) and CHF who presented with acute of chronic decompensated heart failure and Afib with RVR.              #Congestive Heart Failure          -further management as per primary team           -TTE (12/21): EF 55-60%, moderate to severe LVH, moderate to severe TR and pHTN         -further diuresis as per primary team, currently on IV lasix 40mg BID         -currently on metoprolol succinate 25mg daily and spironolactone 25mg BID     -valsartan held 2/2 labile BPs as per primary team              #Atrial Fibrillation with RVR          -further management as per primary team and EP, s/p DCCV on 12/27      -continue with Eliquis 5mg BID        -currently on amiodarone 200mg daily and metoprolol succinate 25mg daily        #UTI         -s/p 3-day course of CTX, (12/26 - 12/28)        #Urinary Retention        -failed TOV, will need outpatient urology follow up vs. repeat TOV at [facility]         #Concern for Entero-Vesicular Fistula        -CT-A/P without evidence of fistula, ACS consulted, no surgical intervention indicated at this time          #Dilated Common Bile Duct and Intrahepatic Biliary Ductal Dilation         #Elevated Bilirubin (resolved)       -incidental finding on CT-A/P, GI consulted, plan for outpatient follow up         #Hypothyroidism           -continue with levothyroxine 100mcg daily             #Microcytic Anemia           -iron studies most consistent with CARRILLO, resume ferrous sulfate 325mg daily upon discharge          #Hyponatremia           -mild and asymptomatic, very likely hypervolemic in setting of ADHF            #Pre-Diabetes (A1c 6.4%)          -no history of DMII and is not on any medications          -outpatient PCP follow up            #HTN         -continue with spironolactone 25mg BID and metoprolol succinate 25mg daily      -valsartan 40mg daily held due to labile BPs           #HLD          -continue with atorvastatin 20mg daily             #Cirrhosis        -incidental finding on CT-A/P, no current evidence for decompensated liver disease, outpatient GI follow up         DVT PPx: Eliquis          Dispo: [facility], not medically ready

## 2023-01-01 NOTE — PROGRESS NOTE ADULT - PROBLEM SELECTOR PLAN 1
--Volume status improving w/ LE edema much improved. Very faint rales. SpO2 96% on 2L NC.   --TTE (12/21/22): LVEF 55-60%, mod-severe LVH, ESTER, mild AR (bioprosth AV), mod MR, mod-severe TR, PASP 79.   --CXR (12/28/22) w/ persistent pulm congestion and B/L pleural effusion.; BNP: 5591  --I/O: -19.2 L over course of admission  -- DIURESIS: s/p lasix 40 mg IV BID; will switch to Lasix 40 mg daily on 1/2/23  -- GDMT: Spironolactone 25 mg BID, Toprol XL 25 mg daily, holding home Valsartan due to labile bps   -- Core measures, weight daily, strict I's and O's

## 2023-01-02 LAB
ANION GAP SERPL CALC-SCNC: 6 MMOL/L — SIGNIFICANT CHANGE UP (ref 5–17)
BUN SERPL-MCNC: 13 MG/DL — SIGNIFICANT CHANGE UP (ref 7–23)
CALCIUM SERPL-MCNC: 8.7 MG/DL — SIGNIFICANT CHANGE UP (ref 8.4–10.5)
CHLORIDE SERPL-SCNC: 90 MMOL/L — LOW (ref 96–108)
CO2 SERPL-SCNC: 30 MMOL/L — SIGNIFICANT CHANGE UP (ref 22–31)
CREAT SERPL-MCNC: 0.41 MG/DL — LOW (ref 0.5–1.3)
EGFR: 91 ML/MIN/1.73M2 — SIGNIFICANT CHANGE UP
GLUCOSE SERPL-MCNC: 237 MG/DL — HIGH (ref 70–99)
HCT VFR BLD CALC: 39 % — SIGNIFICANT CHANGE UP (ref 34.5–45)
HGB BLD-MCNC: 12.4 G/DL — SIGNIFICANT CHANGE UP (ref 11.5–15.5)
MAGNESIUM SERPL-MCNC: 1.8 MG/DL — SIGNIFICANT CHANGE UP (ref 1.6–2.6)
MCHC RBC-ENTMCNC: 24.6 PG — LOW (ref 27–34)
MCHC RBC-ENTMCNC: 31.8 GM/DL — LOW (ref 32–36)
MCV RBC AUTO: 77.4 FL — LOW (ref 80–100)
NRBC # BLD: 0 /100 WBCS — SIGNIFICANT CHANGE UP (ref 0–0)
PLATELET # BLD AUTO: 227 K/UL — SIGNIFICANT CHANGE UP (ref 150–400)
POTASSIUM SERPL-MCNC: 4 MMOL/L — SIGNIFICANT CHANGE UP (ref 3.5–5.3)
POTASSIUM SERPL-SCNC: 4 MMOL/L — SIGNIFICANT CHANGE UP (ref 3.5–5.3)
RBC # BLD: 5.04 M/UL — SIGNIFICANT CHANGE UP (ref 3.8–5.2)
RBC # FLD: 18 % — HIGH (ref 10.3–14.5)
SODIUM SERPL-SCNC: 126 MMOL/L — LOW (ref 135–145)
WBC # BLD: 6.37 K/UL — SIGNIFICANT CHANGE UP (ref 3.8–10.5)
WBC # FLD AUTO: 6.37 K/UL — SIGNIFICANT CHANGE UP (ref 3.8–10.5)

## 2023-01-02 PROCEDURE — 99232 SBSQ HOSP IP/OBS MODERATE 35: CPT

## 2023-01-02 PROCEDURE — 99233 SBSQ HOSP IP/OBS HIGH 50: CPT

## 2023-01-02 RX ORDER — FUROSEMIDE 40 MG
40 TABLET ORAL
Refills: 0 | Status: DISCONTINUED | OUTPATIENT
Start: 2023-01-04 | End: 2023-01-03

## 2023-01-02 RX ORDER — ZALEPLON 10 MG
5 CAPSULE ORAL AT BEDTIME
Refills: 0 | Status: DISCONTINUED | OUTPATIENT
Start: 2023-01-02 | End: 2023-01-03

## 2023-01-02 RX ORDER — MAGNESIUM OXIDE 400 MG ORAL TABLET 241.3 MG
800 TABLET ORAL ONCE
Refills: 0 | Status: COMPLETED | OUTPATIENT
Start: 2023-01-02 | End: 2023-01-02

## 2023-01-02 RX ADMIN — Medication 100 MICROGRAM(S): at 05:32

## 2023-01-02 RX ADMIN — APIXABAN 5 MILLIGRAM(S): 2.5 TABLET, FILM COATED ORAL at 05:32

## 2023-01-02 RX ADMIN — Medication 40 MILLIGRAM(S): at 06:26

## 2023-01-02 RX ADMIN — MAGNESIUM OXIDE 400 MG ORAL TABLET 800 MILLIGRAM(S): 241.3 TABLET ORAL at 14:07

## 2023-01-02 RX ADMIN — APIXABAN 5 MILLIGRAM(S): 2.5 TABLET, FILM COATED ORAL at 17:34

## 2023-01-02 RX ADMIN — Medication 5 MILLIGRAM(S): at 22:07

## 2023-01-02 RX ADMIN — Medication 100 MILLIGRAM(S): at 05:32

## 2023-01-02 RX ADMIN — Medication 500 MICROGRAM(S): at 23:38

## 2023-01-02 RX ADMIN — AMIODARONE HYDROCHLORIDE 200 MILLIGRAM(S): 400 TABLET ORAL at 05:32

## 2023-01-02 RX ADMIN — SPIRONOLACTONE 25 MILLIGRAM(S): 25 TABLET, FILM COATED ORAL at 17:34

## 2023-01-02 RX ADMIN — Medication 500 MICROGRAM(S): at 05:33

## 2023-01-02 RX ADMIN — Medication 25 MILLIGRAM(S): at 05:32

## 2023-01-02 RX ADMIN — ATORVASTATIN CALCIUM 20 MILLIGRAM(S): 80 TABLET, FILM COATED ORAL at 22:08

## 2023-01-02 RX ADMIN — SPIRONOLACTONE 25 MILLIGRAM(S): 25 TABLET, FILM COATED ORAL at 06:26

## 2023-01-02 RX ADMIN — Medication 100 MILLIGRAM(S): at 14:07

## 2023-01-02 NOTE — PROGRESS NOTE ADULT - ASSESSMENT
89-year-old female with a PMHx of HTN, HLD, hypothyroidism, TIA, history of AVR, Afib (s/p remote ablation, on Eliquis) and CHF who presented with acute of chronic decompensated heart failure and Afib with RVR.              #Congestive Heart Failure          -further management as per primary team           -TTE (12/21): EF 55-60%, moderate to severe LVH, moderate to severe TR and pHTN         -further diuresis as per primary team, currently on PO lasix 40mg q48hrs  -currently on metoprolol succinate 25mg daily and spironolactone 25mg BID     -valsartan held 2/2 labile BPs as per primary team              #Atrial Fibrillation with RVR          -further management as per primary team and EP, s/p DCCV on 12/27      -continue with Eliquis 5mg BID        -currently on amiodarone 200mg daily and metoprolol succinate 25mg daily        #UTI         -s/p 3-day course of CTX, (12/26 - 12/28)        #Urinary Retention        -failed TOV, will need outpatient urology follow up vs. repeat TOV at 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        -CT-A/P without evidence of fistula, ACS consulted, no surgical intervention indicated at this time          #Dilated Common Bile Duct and Intrahepatic Biliary Ductal Dilation         #Elevated Bilirubin (resolved)       -incidental finding on CT-A/P, GI consulted, plan for outpatient follow up        #Hypothyroidism           -continue with levothyroxine 100mcg daily             #Microcytic Anemia           -iron studies most consistent with CARRILLO, resume ferrous sulfate 325mg daily upon discharge          #Hyponatremia           -asymptomatic but worsening, acute worsening likely in setting of diuresis   -diuresis decreased, continue with fluid restriction  -follow up AM labs      #Pre-Diabetes (A1c 6.4%)          -no history of DMII and is not on any medications          -outpatient PCP follow up            #HTN         -continue with spironolactone 25mg BID and metoprolol succinate 25mg daily      -valsartan 40mg daily held due to labile BPs           #HLD          -continue with atorvastatin 20mg daily             #Cirrhosis        -incidental finding on CT-A/P, no current evidence for decompensated liver disease, outpatient GI follow up         DVT PPx: Eliquis          Dispo: [facility], possibly tomorrow

## 2023-01-02 NOTE — PROGRESS NOTE ADULT - PROBLEM SELECTOR PLAN 6
--H/H stable; Hgb ~10-11s, no active signs of bleeding.   --Iron studies c/w CARRILLO; initiated IV iron eventually but stopped once pt w/ infection.  --Restarted PO Iron supplementation. --H/H stable; Hgb 12.4 no active signs of bleeding.   --Iron studies c/w CARRILLO; initiated IV iron eventually but stopped once pt w/ infection.  --Restarted PO Iron supplementation.

## 2023-01-02 NOTE — PROGRESS NOTE ADULT - PROBLEM SELECTOR PLAN 1
--Volume status improving w/ LE edema much improved. Very faint rales. SpO2 96% on 2L NC.   --TTE (12/21/22): LVEF 55-60%, mod-severe LVH, ESTER, mild AR (bioprosth AV), mod MR, mod-severe TR, PASP 79.   --CXR (12/28/22) w/ persistent pulm congestion and B/L pleural effusion.; BNP: 5591  --I/O: -19.2 L over course of admission  -- DIURESIS: s/p lasix 40 mg IV BID; will switch to Lasix 40 mg daily on 1/2/23  -- GDMT: Spironolactone 25 mg BID, Toprol XL 25 mg daily, holding home Valsartan due to labile bps   -- Core measures, weight daily, strict I's and O's Volume status improving w/ LE edema much improved. Very faint rales. SpO2 96% on 2L NC.   --TTE (12/21/22): LVEF 55-60%, mod-severe LVH, ESTER, mild AR (bioprosth AV), mod MR, mod-severe TR, PASP 79.   --CXR (12/28/22) w/ persistent pulm congestion and B/L pleural effusion.; BNP: 5591  --I/O: -20.4 L over course of admission  --DIURESIS: s/p lasix 40 mg IV BID; switched to Lasix 40 mg every other day on 1/2  --GDMT: Spironolactone 25 mg BID, Toprol XL 25 mg daily, holding home Valsartan due to labile bps   --Core measures, weight daily, strict I's and O's    # Hyponatremia  -  on arrival now downtrended to 126 on 1/2. Continue fluid restriction and QOD diuretic (next dose 1/4)

## 2023-01-02 NOTE — PROGRESS NOTE ADULT - PROBLEM SELECTOR PLAN 3
--Hosp course c/b UTI w/ UCx (+) for E. coli and Proteus m (concern for contamination).   --s/p Ceftriaxone 1000mg IV QD x5 days.   --Failed TOV 12/28/22, striaght cath x2 then subsequent bladder scan w/ >900cc - soto placed again 12/29/22. --Hosp course c/b UTI w/ UCx (+) for E. coli and Proteus m (concern for contamination).   --s/p Ceftriaxone 1000mg IV QD x5 days.   --Failed TOV 12/28/22, striaght cath x2 then subsequent bladder scan w/ >900cc - soto placed again 12/29/22. Pt again failed TOV on 1/2/23, straight cath x 2 and soto replaced. Plan to d/c to [facility] with soto in place.

## 2023-01-02 NOTE — PROGRESS NOTE ADULT - SUBJECTIVE AND OBJECTIVE BOX
Subjective:  No acute events overnight.  Patient is doing well today without new complaints.  Denies HA, CP, SOB, abdominal pain, nausea, vomiting, fever, chills or diarrhea.     Objective:  Vital Signs:  T(C): 36.2 (02 Jan 2023 13:21), Max: 36.9 (02 Jan 2023 06:52)  T(F): 97.1 (02 Jan 2023 13:21), Max: 98.5 (02 Jan 2023 06:52)  HR: 75 (02 Jan 2023 12:00) (70 - 84)  BP: 98/52 (02 Jan 2023 12:00) (97/55 - 146/66)  BP(mean): --  RR: 18 (02 Jan 2023 12:00) (16 - 18)  SpO2: 98% (02 Jan 2023 12:00) (95% - 100%)    Parameters below as of 02 Jan 2023 12:00  Patient On (Oxygen Delivery Method): nasal cannula  O2 Flow (L/min): 2    Physical Exam:   -Gen: NAD, resting in bed, pleasant and cooperative  -HEENT: EOMI, PERRL, no scleral icterus, no JVD, no bruits  -CV: normal S1 and S2, no murmurs appreciated   -Lungs: CTABL, normal respiratory effort on NC  -Ab: soft, NT, ND, normal BS  -Ext: no LE edema, no rashes  -Neuro: A&O x 3, CN 2-12 intact, no focal deficits     Labs:                        12.4   6.37  )-----------( 227      ( 02 Jan 2023 10:23 )             39.0       01-02    126<L>  |  90<L>  |  13  ----------------------------<  237<H>  4.0   |  30  |  0.41<L>    Ca    8.7      02 Jan 2023 10:23  Mg     1.8     01-02    Medications:  MEDICATIONS  (STANDING):  aMIOdarone    Tablet 200 milliGRAM(s) Oral daily  apixaban 5 milliGRAM(s) Oral two times a day  atorvastatin 20 milliGRAM(s) Oral at bedtime  benzonatate 100 milliGRAM(s) Oral three times a day  influenza  Vaccine (HIGH DOSE) 0.7 milliLiter(s) IntraMuscular once  ipratropium    for Nebulization 500 MICROGram(s) Nebulizer every 6 hours  levothyroxine 100 MICROGram(s) Oral daily  magnesium oxide 800 milliGRAM(s) Oral once  melatonin 5 milliGRAM(s) Oral at bedtime  metoprolol succinate ER 25 milliGRAM(s) Oral daily  spironolactone 25 milliGRAM(s) Oral every 12 hours    MEDICATIONS  (PRN):  albuterol   0.5% 2.5 milliGRAM(s) Nebulizer every 6 hours PRN Shortness of Breath and/or Wheezing  guaiFENesin  milliGRAM(s) Oral every 12 hours PRN Cough  sodium chloride 0.65% Nasal 1 Spray(s) Both Nostrils daily PRN Nasal Congestion  zaleplon 5 milliGRAM(s) Oral at bedtime PRN Insomnia

## 2023-01-02 NOTE — PROGRESS NOTE ADULT - SUBJECTIVE AND OBJECTIVE BOX
S: Pt seen and examined bedside.  Patient denies C/P, SOB, N/V, dizziness, palpitations, and diaphoresis.  Pt denies fever/chills, dysuria, abdominal pain, diarrhea, and cough  12 Point ROS otherwise negative except as per HPI/subjective.     O: Vital Signs Last 24 Hrs  T(C): 36.3 (02 Jan 2023 09:16), Max: 36.9 (02 Jan 2023 06:52)  T(F): 97.3 (02 Jan 2023 09:16), Max: 98.5 (02 Jan 2023 06:52)  HR: 73 (02 Jan 2023 08:58) (70 - 84)  BP: 116/56 (02 Jan 2023 08:58) (97/55 - 146/66)  BP(mean): --  RR: 18 (02 Jan 2023 08:58) (16 - 18)  SpO2: 100% (02 Jan 2023 08:58) (95% - 100%)    Parameters below as of 02 Jan 2023 08:58  Patient On (Oxygen Delivery Method): nasal cannula  O2 Flow (L/min): 2      PHYSICAL EXAM:  GEN: NAD  HEENT: No JVD  PULM:  CTA B/L  CARD:  RRR, S1 and S2   ABD: +BS, NT, soft/ND	  EXT: No Edema B/L LE  NEURO: A+Ox3, no focal deficit  PSYCH: Mood Appropriate    LABS:                        12.1   5.74  )-----------( 233      ( 01 Jan 2023 07:09 )             38.5     01-01    128<L>  |  90<L>  |  10  ----------------------------<  125<H>  4.0   |  30  |  0.39<L>    Ca    8.8      01 Jan 2023 07:09  Mg     1.7     01-01 01-01 @ 07:01  -  01-02 @ 07:00  --------------------------------------------------------  IN: 540 mL / OUT: 1625 mL / NET: -1085 mL    01-02 @ 07:01  -  01-02 @ 09:35  --------------------------------------------------------  IN: 0 mL / OUT: 550 mL / NET: -550 mL      Daily     Daily    S: Pt seen and examined bedside. Pt reports she was not able to sleep at all last night otherwise feeling well.   Patient denies C/P, SOB, N/V, dizziness, palpitations, and diaphoresis.  Pt denies fever/chills, dysuria, abdominal pain, diarrhea, and cough  12 Point ROS otherwise negative except as per HPI/subjective.     O: Vital Signs Last 24 Hrs  T(C): 36.3 (02 Jan 2023 09:16), Max: 36.9 (02 Jan 2023 06:52)  T(F): 97.3 (02 Jan 2023 09:16), Max: 98.5 (02 Jan 2023 06:52)  HR: 73 (02 Jan 2023 08:58) (70 - 84)  BP: 116/56 (02 Jan 2023 08:58) (97/55 - 146/66)  BP(mean): --  RR: 18 (02 Jan 2023 08:58) (16 - 18)  SpO2: 100% (02 Jan 2023 08:58) (95% - 100%)    Parameters below as of 02 Jan 2023 08:58  Patient On (Oxygen Delivery Method): nasal cannula  O2 Flow (L/min): 2      PHYSICAL EXAM:  Appearance: Normal, NAD   HEENT:   Normal oral mucosa, PERRL, EOMI	  Neck: Supple,  - JVD;  no Carotid Bruit   Cardiovascular: Normal S1 S2, No JVD, No murmurs,   Respiratory: Lungs clear to auscultation	  Gastrointestinal:  Soft, Non-tender, + BS	  Skin: No rashes, No ecchymoses, No cyanosis  Extremities: Normal range of motion, No clubbing, cyanosis or edema  Vascular: Peripheral pulses palpable 2+ bilaterally  Neurologic: Non-focal  Psychiatry: A & O x 3, Mood & affect appropriate    LABS:                        12.1   5.74  )-----------( 233      ( 01 Jan 2023 07:09 )             38.5     01-01    128<L>  |  90<L>  |  10  ----------------------------<  125<H>  4.0   |  30  |  0.39<L>    Ca    8.8      01 Jan 2023 07:09  Mg     1.7     01-01 01-01 @ 07:01 - 01-02 @ 07:00  --------------------------------------------------------  IN: 540 mL / OUT: 1625 mL / NET: -1085 mL    01-02 @ 07:01 - 01-02 @ 09:35  --------------------------------------------------------  IN: 0 mL / OUT: 550 mL / NET: -550 mL

## 2023-01-02 NOTE — PROGRESS NOTE ADULT - PROBLEM/PLAN-2
Unable to obtain lactic or urine in triage. Patient advised urine sample needed.
DISPLAY PLAN FREE TEXT

## 2023-01-02 NOTE — PROGRESS NOTE ADULT - ASSESSMENT
89F w/ PMHx HTN, HLD, AF on Eliquis (remote h/o ablation >10yrs ago), HFpEF, s/p bioprosthetic AVR, Hypothyroidism and TIA, admitted to cardiac tele for further management of acute decompensated CHF and AF w/ RVR. Pt now s/p successful DCCV. Hospital course c/b UTI, s/p abx now resolved. S/p IV diuresis over the weekend and awaiting JENNIE (accepted at [facility] rehab) 89 yo F w/ PMHx HTN, HLD, AF on Eliquis (remote h/o ablation >10yrs ago), HFpEF, s/p bioprosthetic AVR, Hypothyroidism and TIA, admitted to cardiac tele for further management of acute decompensated CHF and AF w/ RVR. Pt now s/p successful DCCV and IV diuresis. Hospital course c/b UTI, s/p abx now resolved. Further complicated by urinary retention s/p multiple soto placements and failed TOV's. Plan to d/c to [facility] with soto tomorrow.

## 2023-01-03 VITALS
DIASTOLIC BLOOD PRESSURE: 61 MMHG | RESPIRATION RATE: 17 BRPM | SYSTOLIC BLOOD PRESSURE: 134 MMHG | HEART RATE: 78 BPM | OXYGEN SATURATION: 94 %

## 2023-01-03 LAB
ANION GAP SERPL CALC-SCNC: 8 MMOL/L — SIGNIFICANT CHANGE UP (ref 5–17)
BASOPHILS # BLD AUTO: 0.03 K/UL — SIGNIFICANT CHANGE UP (ref 0–0.2)
BASOPHILS NFR BLD AUTO: 0.4 % — SIGNIFICANT CHANGE UP (ref 0–2)
BUN SERPL-MCNC: 15 MG/DL — SIGNIFICANT CHANGE UP (ref 7–23)
CALCIUM SERPL-MCNC: 8.8 MG/DL — SIGNIFICANT CHANGE UP (ref 8.4–10.5)
CHLORIDE SERPL-SCNC: 93 MMOL/L — LOW (ref 96–108)
CO2 SERPL-SCNC: 28 MMOL/L — SIGNIFICANT CHANGE UP (ref 22–31)
CREAT SERPL-MCNC: 0.42 MG/DL — LOW (ref 0.5–1.3)
EGFR: 90 ML/MIN/1.73M2 — SIGNIFICANT CHANGE UP
EOSINOPHIL # BLD AUTO: 0.11 K/UL — SIGNIFICANT CHANGE UP (ref 0–0.5)
EOSINOPHIL NFR BLD AUTO: 1.5 % — SIGNIFICANT CHANGE UP (ref 0–6)
GLUCOSE SERPL-MCNC: 116 MG/DL — HIGH (ref 70–99)
HCT VFR BLD CALC: 38 % — SIGNIFICANT CHANGE UP (ref 34.5–45)
HGB BLD-MCNC: 12.1 G/DL — SIGNIFICANT CHANGE UP (ref 11.5–15.5)
IMM GRANULOCYTES NFR BLD AUTO: 1.4 % — HIGH (ref 0–0.9)
LYMPHOCYTES # BLD AUTO: 1.18 K/UL — SIGNIFICANT CHANGE UP (ref 1–3.3)
LYMPHOCYTES # BLD AUTO: 16.3 % — SIGNIFICANT CHANGE UP (ref 13–44)
MAGNESIUM SERPL-MCNC: 2.4 MG/DL — SIGNIFICANT CHANGE UP (ref 1.6–2.6)
MCHC RBC-ENTMCNC: 24.4 PG — LOW (ref 27–34)
MCHC RBC-ENTMCNC: 31.8 GM/DL — LOW (ref 32–36)
MCV RBC AUTO: 76.6 FL — LOW (ref 80–100)
MONOCYTES # BLD AUTO: 0.87 K/UL — SIGNIFICANT CHANGE UP (ref 0–0.9)
MONOCYTES NFR BLD AUTO: 12 % — SIGNIFICANT CHANGE UP (ref 2–14)
NEUTROPHILS # BLD AUTO: 4.93 K/UL — SIGNIFICANT CHANGE UP (ref 1.8–7.4)
NEUTROPHILS NFR BLD AUTO: 68.4 % — SIGNIFICANT CHANGE UP (ref 43–77)
NRBC # BLD: 0 /100 WBCS — SIGNIFICANT CHANGE UP (ref 0–0)
PLATELET # BLD AUTO: 240 K/UL — SIGNIFICANT CHANGE UP (ref 150–400)
POTASSIUM SERPL-MCNC: 4.7 MMOL/L — SIGNIFICANT CHANGE UP (ref 3.5–5.3)
POTASSIUM SERPL-SCNC: 4.7 MMOL/L — SIGNIFICANT CHANGE UP (ref 3.5–5.3)
RBC # BLD: 4.96 M/UL — SIGNIFICANT CHANGE UP (ref 3.8–5.2)
RBC # FLD: 18 % — HIGH (ref 10.3–14.5)
SODIUM SERPL-SCNC: 129 MMOL/L — LOW (ref 135–145)
WBC # BLD: 7.22 K/UL — SIGNIFICANT CHANGE UP (ref 3.8–10.5)
WBC # FLD AUTO: 7.22 K/UL — SIGNIFICANT CHANGE UP (ref 3.8–10.5)

## 2023-01-03 PROCEDURE — 99239 HOSP IP/OBS DSCHRG MGMT >30: CPT

## 2023-01-03 PROCEDURE — 99233 SBSQ HOSP IP/OBS HIGH 50: CPT

## 2023-01-03 RX ORDER — ALPRAZOLAM 0.25 MG
1 TABLET ORAL
Qty: 0 | Refills: 0 | DISCHARGE

## 2023-01-03 RX ORDER — SENNA PLUS 8.6 MG/1
2 TABLET ORAL
Qty: 0 | Refills: 0 | DISCHARGE
Start: 2023-01-03

## 2023-01-03 RX ORDER — SODIUM CHLORIDE 0.65 %
1 AEROSOL, SPRAY (ML) NASAL
Qty: 0 | Refills: 0 | DISCHARGE
Start: 2023-01-03

## 2023-01-03 RX ORDER — APIXABAN 2.5 MG/1
1 TABLET, FILM COATED ORAL
Qty: 0 | Refills: 0 | DISCHARGE

## 2023-01-03 RX ORDER — IPRATROPIUM BROMIDE 0.2 MG/ML
2.5 SOLUTION, NON-ORAL INHALATION
Qty: 0 | Refills: 0 | DISCHARGE
Start: 2023-01-03

## 2023-01-03 RX ORDER — VALSARTAN 80 MG/1
1 TABLET ORAL
Qty: 0 | Refills: 0 | DISCHARGE

## 2023-01-03 RX ORDER — SENNA PLUS 8.6 MG/1
2 TABLET ORAL AT BEDTIME
Refills: 0 | Status: DISCONTINUED | OUTPATIENT
Start: 2023-01-03 | End: 2023-01-03

## 2023-01-03 RX ORDER — AMIODARONE HYDROCHLORIDE 400 MG/1
1 TABLET ORAL
Qty: 0 | Refills: 0 | DISCHARGE
Start: 2023-01-03

## 2023-01-03 RX ORDER — ATORVASTATIN CALCIUM 80 MG/1
1 TABLET, FILM COATED ORAL
Qty: 0 | Refills: 0 | DISCHARGE

## 2023-01-03 RX ORDER — LANOLIN ALCOHOL/MO/W.PET/CERES
1 CREAM (GRAM) TOPICAL
Qty: 0 | Refills: 0 | DISCHARGE
Start: 2023-01-03

## 2023-01-03 RX ORDER — FUROSEMIDE 40 MG
1 TABLET ORAL
Qty: 0 | Refills: 0 | DISCHARGE
Start: 2023-01-03

## 2023-01-03 RX ORDER — APIXABAN 2.5 MG/1
1 TABLET, FILM COATED ORAL
Qty: 0 | Refills: 0 | DISCHARGE
Start: 2023-01-03

## 2023-01-03 RX ORDER — METOPROLOL TARTRATE 50 MG
1 TABLET ORAL
Qty: 0 | Refills: 0 | DISCHARGE
Start: 2023-01-03

## 2023-01-03 RX ORDER — ATENOLOL 25 MG/1
1 TABLET ORAL
Qty: 0 | Refills: 0 | DISCHARGE

## 2023-01-03 RX ORDER — ZALEPLON 10 MG
1 CAPSULE ORAL
Qty: 0 | Refills: 0 | DISCHARGE
Start: 2023-01-03

## 2023-01-03 RX ORDER — SPIRONOLACTONE 25 MG/1
1 TABLET, FILM COATED ORAL
Qty: 0 | Refills: 0 | DISCHARGE
Start: 2023-01-03

## 2023-01-03 RX ORDER — ATORVASTATIN CALCIUM 80 MG/1
1 TABLET, FILM COATED ORAL
Qty: 0 | Refills: 0 | DISCHARGE
Start: 2023-01-03

## 2023-01-03 RX ORDER — FERROUS SULFATE 325(65) MG
1 TABLET ORAL
Qty: 30 | Refills: 0
Start: 2023-01-03 | End: 2023-02-01

## 2023-01-03 RX ORDER — FUROSEMIDE 40 MG
1 TABLET ORAL
Qty: 0 | Refills: 0 | DISCHARGE

## 2023-01-03 RX ORDER — ALBUTEROL 90 UG/1
0.5 AEROSOL, METERED ORAL
Qty: 0 | Refills: 0 | DISCHARGE
Start: 2023-01-03

## 2023-01-03 RX ADMIN — Medication 100 MILLIGRAM(S): at 00:03

## 2023-01-03 RX ADMIN — AMIODARONE HYDROCHLORIDE 200 MILLIGRAM(S): 400 TABLET ORAL at 05:55

## 2023-01-03 RX ADMIN — APIXABAN 5 MILLIGRAM(S): 2.5 TABLET, FILM COATED ORAL at 05:55

## 2023-01-03 RX ADMIN — SPIRONOLACTONE 25 MILLIGRAM(S): 25 TABLET, FILM COATED ORAL at 06:02

## 2023-01-03 RX ADMIN — Medication 100 MICROGRAM(S): at 05:54

## 2023-01-03 RX ADMIN — Medication 25 MILLIGRAM(S): at 06:02

## 2023-01-03 NOTE — PROGRESS NOTE ADULT - ASSESSMENT
89-year-old female with a PMHx of HTN, HLD, hypothyroidism, TIA, history of AVR, Afib (s/p remote ablation, on Eliquis) and CHF who presented with acute of chronic decompensated heart failure and Afib with RVR.               #Congestive Heart Failure           -further management as per primary team            -TTE (12/21): EF 55-60%, moderate to severe LVH, moderate to severe TR and pHTN          -further diuresis as per primary team, currently on PO lasix 40mg q48hrs   -currently on metoprolol succinate 25mg daily and spironolactone 25mg BID      -valsartan held 2/2 labile BPs as per primary team               #Atrial Fibrillation with RVR           -further management as per primary team and EP, s/p DCCV on 12/27       -continue with Eliquis 5mg BID, amiodarone 200mg daily and metoprolol succinate 25mg daily         #UTI          -s/p 3-day course of CTX, (12/26 - 12/28)         #Urinary Retention         -failed TOV, will need outpatient urology follow up vs. 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         -CT-A/P without evidence of fistula, ACS consulted, no surgical intervention indicated at this time           #Dilated Common Bile Duct and Intrahepatic Biliary Ductal Dilation          #Elevated Bilirubin (resolved)        -incidental finding on CT-A/P, GI consulted, plan for outpatient follow up         #Hypothyroidism            -continue with levothyroxine 100mcg daily              #Microcytic Anemia            -iron studies most consistent with CARRILLO, resume ferrous sulfate 325mg daily upon discharge           #Hyponatremia            -asymptomatic, worsened likely due to diuresis, improved with decreased diuresis   -recommend repeat BMP in 1 week       #Pre-Diabetes (A1c 6.4%)           -no history of DMII and is not on any medications           -outpatient PCP follow up             #HTN          -continue with spironolactone 25mg BID and metoprolol succinate 25mg daily       -valsartan 40mg daily held due to labile BPs            #HLD           -continue with atorvastatin 20mg daily              #Cirrhosis       -incidental finding on CT-A/P, no current evidence for decompensated liver disease, outpatient GI follow up         DVT PPx: Eliquis           Dispo: [facility] today

## 2023-01-03 NOTE — DISCHARGE NOTE NURSING/CASE MANAGEMENT/SOCIAL WORK - NSDCPEFALRISK_GEN_ALL_CORE
For information on Fall & Injury Prevention, visit: [email or web address] OR  [email or web address] OR  https://www.cdc.gov/steadi/patient.html

## 2023-01-03 NOTE — DISCHARGE NOTE NURSING/CASE MANAGEMENT/SOCIAL WORK - PATIENT PORTAL LINK FT
You can access the FollowMyHealth Patient Portal offered by [facility] by registering at the following website: [email or web address]. By joining [employer]’s FollowMyHealth portal, you will also be able to view your health information using other applications (apps) compatible with our system.

## 2023-01-03 NOTE — DISCHARGE NOTE NURSING/CASE MANAGEMENT/SOCIAL WORK - NSDCFUADDAPPT_GEN_ALL_CORE_FT
PLEASE CALL YOUR PMD TO GET REPEAT LAB WORK (BASIC METABOLIC PANEL) IN 1 WEEK TO CHECK YOUR SODIUM LEVEL.     Medical Records Department: [phone]

## 2023-01-03 NOTE — PROGRESS NOTE ADULT - PROVIDER SPECIALTY LIST ADULT
Cardiology
Cardiology
Electrophysiology
Hospitalist
Electrophysiology
Hospitalist
Intervent Cardiology
Intervent Cardiology
Surgery
Surgery
Cardiology
Electrophysiology
Electrophysiology
Hospitalist
Intervent Cardiology
Intervent Cardiology
Cardiology
Intervent Cardiology
Cardiology
Cardiology

## 2023-01-03 NOTE — PROGRESS NOTE ADULT - NS ATTEND AMEND GEN_ALL_CORE FT
89-year-old female with a PMHx of HTN, HLD, hypothyroidism, TIA, history of AVR, Afib (s/p remote ablation, on Eliquis) and CHF who presented with acute of chronic decompensated heart failure and Afib with RVR.               #Congestive Heart Failure                    -TTE (12/21): EF 55-60%, moderate to severe LVH, moderate to severe TR and pHTN          - euvolemic now, transitioned to PO lasix 40mg q48hrs , stable   -currently on metoprolol succinate 25mg daily and spironolactone 25mg BID      -valsartan held 2/2 labile BPs as per primary team    - last N 127  - stable for d/c              #Atrial Fibrillation with RVR           -s/p DCCV on 12/27       -continue with Eliquis 5mg BID, amiodarone 200mg daily and metoprolol succinate 25mg daily

## 2023-01-03 NOTE — PROGRESS NOTE ADULT - SUBJECTIVE AND OBJECTIVE BOX
Subjective:  No acute events overnight.  Patient is doing well today without new complaints.  Denies HA, CP, SOB, abdominal pain, nausea, vomiting, fever, chills or diarrhea.     Objective:   Vital Signs:  T(C): 36.2 (03 Jan 2023 09:49), Max: 37.1 (02 Jan 2023 21:38)  T(F): 97.1 (03 Jan 2023 09:49), Max: 98.7 (02 Jan 2023 21:38)  HR: 80 (03 Jan 2023 09:15) (76 - 84)  BP: 103/58 (03 Jan 2023 09:15) (98/51 - 145/65)  BP(mean): 84 (03 Jan 2023 05:48) (82 - 91)  RR: 16 (03 Jan 2023 08:25) (16 - 18)  SpO2: 93% (03 Jan 2023 09:15) (93% - 100%)    Parameters below as of 03 Jan 2023 09:15  Patient On (Oxygen Delivery Method): room air    Physical Exam:   -Gen: NAD, resting in bed, pleasant and cooperative  -HEENT: EOMI, PERRL, no scleral icterus, no JVD, no bruits  -CV: normal S1 and S2, no murmurs appreciated   -Lungs: CTABL, normal respiratory effort on NC  -Ab: soft, NT, ND, normal BS  -Ext: no LE edema, no rashes  -Neuro: A&O x 3, CN 2-12 intact, no focal deficits     Labs:                        12.1   7.22  )-----------( 240      ( 03 Jan 2023 06:09 )             38.0       01-03    129<L>  |  93<L>  |  15  ----------------------------<  116<H>  4.7   |  28  |  0.42<L>    Ca    8.8      03 Jan 2023 06:09  Mg     2.4     01-03     Medications:  MEDICATIONS  (STANDING):  aMIOdarone    Tablet 200 milliGRAM(s) Oral daily  apixaban 5 milliGRAM(s) Oral two times a day  atorvastatin 20 milliGRAM(s) Oral at bedtime  benzonatate 100 milliGRAM(s) Oral three times a day  ipratropium    for Nebulization 500 MICROGram(s) Nebulizer every 6 hours  levothyroxine 100 MICROGram(s) Oral daily  melatonin 5 milliGRAM(s) Oral at bedtime  metoprolol succinate ER 25 milliGRAM(s) Oral daily  senna 2 Tablet(s) Oral at bedtime  spironolactone 25 milliGRAM(s) Oral every 12 hours    MEDICATIONS  (PRN):  albuterol   0.5% 2.5 milliGRAM(s) Nebulizer every 6 hours PRN Shortness of Breath and/or Wheezing  guaiFENesin  milliGRAM(s) Oral every 12 hours PRN Cough  sodium chloride 0.65% Nasal 1 Spray(s) Both Nostrils daily PRN Nasal Congestion  zaleplon 5 milliGRAM(s) Oral at bedtime PRN Insomnia

## 2023-01-06 DIAGNOSIS — K83.8 OTHER SPECIFIED DISEASES OF BILIARY TRACT: ICD-10-CM

## 2023-01-06 DIAGNOSIS — K74.60 UNSPECIFIED CIRRHOSIS OF LIVER: ICD-10-CM

## 2023-01-06 DIAGNOSIS — I48.19 OTHER PERSISTENT ATRIAL FIBRILLATION: ICD-10-CM

## 2023-01-06 DIAGNOSIS — Z86.73 PERSONAL HISTORY OF TRANSIENT ISCHEMIC ATTACK (TIA), AND CEREBRAL INFARCTION WITHOUT RESIDUAL DEFICITS: ICD-10-CM

## 2023-01-06 DIAGNOSIS — I27.20 PULMONARY HYPERTENSION, UNSPECIFIED: ICD-10-CM

## 2023-01-06 DIAGNOSIS — B96.20 UNSPECIFIED ESCHERICHIA COLI [E. COLI] AS THE CAUSE OF DISEASES CLASSIFIED ELSEWHERE: ICD-10-CM

## 2023-01-06 DIAGNOSIS — R17 UNSPECIFIED JAUNDICE: ICD-10-CM

## 2023-01-06 DIAGNOSIS — I08.1 RHEUMATIC DISORDERS OF BOTH MITRAL AND TRICUSPID VALVES: ICD-10-CM

## 2023-01-06 DIAGNOSIS — I11.0 HYPERTENSIVE HEART DISEASE WITH HEART FAILURE: ICD-10-CM

## 2023-01-06 DIAGNOSIS — F41.9 ANXIETY DISORDER, UNSPECIFIED: ICD-10-CM

## 2023-01-06 DIAGNOSIS — N39.0 URINARY TRACT INFECTION, SITE NOT SPECIFIED: ICD-10-CM

## 2023-01-06 DIAGNOSIS — I50.33 ACUTE ON CHRONIC DIASTOLIC (CONGESTIVE) HEART FAILURE: ICD-10-CM

## 2023-01-06 DIAGNOSIS — Z90.710 ACQUIRED ABSENCE OF BOTH CERVIX AND UTERUS: ICD-10-CM

## 2023-01-06 DIAGNOSIS — Z95.2 PRESENCE OF PROSTHETIC HEART VALVE: ICD-10-CM

## 2023-01-06 DIAGNOSIS — R33.9 RETENTION OF URINE, UNSPECIFIED: ICD-10-CM

## 2023-01-06 DIAGNOSIS — E87.1 HYPO-OSMOLALITY AND HYPONATREMIA: ICD-10-CM

## 2023-01-06 DIAGNOSIS — D50.9 IRON DEFICIENCY ANEMIA, UNSPECIFIED: ICD-10-CM

## 2023-01-06 DIAGNOSIS — E03.9 HYPOTHYROIDISM, UNSPECIFIED: ICD-10-CM

## 2023-01-06 DIAGNOSIS — G47.00 INSOMNIA, UNSPECIFIED: ICD-10-CM

## 2023-01-06 DIAGNOSIS — Z95.3 PRESENCE OF XENOGENIC HEART VALVE: ICD-10-CM

## 2023-01-06 DIAGNOSIS — R73.03 PREDIABETES: ICD-10-CM

## 2023-01-06 DIAGNOSIS — E78.5 HYPERLIPIDEMIA, UNSPECIFIED: ICD-10-CM

## 2023-01-26 ENCOUNTER — EMERGENCY (EMERGENCY)
Facility: HOSPITAL | Age: 88
LOS: 1 days | Discharge: HOME | End: 2023-01-26
Attending: EMERGENCY MEDICINE | Admitting: EMERGENCY MEDICINE
Payer: MEDICARE

## 2023-01-26 VITALS
WEIGHT: 145.06 LBS | TEMPERATURE: 98 F | OXYGEN SATURATION: 93 % | DIASTOLIC BLOOD PRESSURE: 47 MMHG | RESPIRATION RATE: 18 BRPM | SYSTOLIC BLOOD PRESSURE: 81 MMHG | HEART RATE: 64 BPM | HEIGHT: 66 IN

## 2023-01-26 VITALS
DIASTOLIC BLOOD PRESSURE: 60 MMHG | RESPIRATION RATE: 18 BRPM | OXYGEN SATURATION: 97 % | TEMPERATURE: 98 F | HEART RATE: 68 BPM | SYSTOLIC BLOOD PRESSURE: 120 MMHG

## 2023-01-26 DIAGNOSIS — I48.91 UNSPECIFIED ATRIAL FIBRILLATION: ICD-10-CM

## 2023-01-26 DIAGNOSIS — Z90.49 ACQUIRED ABSENCE OF OTHER SPECIFIED PARTS OF DIGESTIVE TRACT: ICD-10-CM

## 2023-01-26 DIAGNOSIS — Z86.73 PERSONAL HISTORY OF TRANSIENT ISCHEMIC ATTACK (TIA), AND CEREBRAL INFARCTION WITHOUT RESIDUAL DEFICITS: ICD-10-CM

## 2023-01-26 DIAGNOSIS — Z20.822 CONTACT WITH AND (SUSPECTED) EXPOSURE TO COVID-19: ICD-10-CM

## 2023-01-26 DIAGNOSIS — I95.9 HYPOTENSION, UNSPECIFIED: ICD-10-CM

## 2023-01-26 DIAGNOSIS — E03.9 HYPOTHYROIDISM, UNSPECIFIED: ICD-10-CM

## 2023-01-26 DIAGNOSIS — I50.9 HEART FAILURE, UNSPECIFIED: ICD-10-CM

## 2023-01-26 DIAGNOSIS — Z79.01 LONG TERM (CURRENT) USE OF ANTICOAGULANTS: ICD-10-CM

## 2023-01-26 DIAGNOSIS — K92.1 MELENA: ICD-10-CM

## 2023-01-26 DIAGNOSIS — I11.0 HYPERTENSIVE HEART DISEASE WITH HEART FAILURE: ICD-10-CM

## 2023-01-26 DIAGNOSIS — E78.00 PURE HYPERCHOLESTEROLEMIA, UNSPECIFIED: ICD-10-CM

## 2023-01-26 DIAGNOSIS — I44.4 LEFT ANTERIOR FASCICULAR BLOCK: ICD-10-CM

## 2023-01-26 DIAGNOSIS — Z95.2 PRESENCE OF PROSTHETIC HEART VALVE: Chronic | ICD-10-CM

## 2023-01-26 DIAGNOSIS — L89.322 PRESSURE ULCER OF LEFT BUTTOCK, STAGE 2: ICD-10-CM

## 2023-01-26 DIAGNOSIS — Z90.49 ACQUIRED ABSENCE OF OTHER SPECIFIED PARTS OF DIGESTIVE TRACT: Chronic | ICD-10-CM

## 2023-01-26 PROBLEM — I10 ESSENTIAL (PRIMARY) HYPERTENSION: Chronic | Status: ACTIVE | Noted: 2022-12-20

## 2023-01-26 PROBLEM — G45.9 TRANSIENT CEREBRAL ISCHEMIC ATTACK, UNSPECIFIED: Chronic | Status: ACTIVE | Noted: 2022-12-20

## 2023-01-26 PROBLEM — E78.5 HYPERLIPIDEMIA, UNSPECIFIED: Chronic | Status: ACTIVE | Noted: 2022-12-20

## 2023-01-26 LAB
ACANTHOCYTES BLD QL SMEAR: SIGNIFICANT CHANGE UP
ALBUMIN SERPL ELPH-MCNC: 3.1 G/DL — LOW (ref 3.3–5)
ALP SERPL-CCNC: 52 U/L — SIGNIFICANT CHANGE UP (ref 40–120)
ALT FLD-CCNC: 23 U/L — SIGNIFICANT CHANGE UP (ref 10–45)
ANION GAP SERPL CALC-SCNC: 6 MMOL/L — SIGNIFICANT CHANGE UP (ref 5–17)
ANISOCYTOSIS BLD QL: SLIGHT — SIGNIFICANT CHANGE UP
APTT BLD: 33.9 SEC — SIGNIFICANT CHANGE UP (ref 27.5–35.5)
AST SERPL-CCNC: 32 U/L — SIGNIFICANT CHANGE UP (ref 10–40)
BASOPHILS # BLD AUTO: 0 K/UL — SIGNIFICANT CHANGE UP (ref 0–0.2)
BASOPHILS NFR BLD AUTO: 0 % — SIGNIFICANT CHANGE UP (ref 0–2)
BILIRUB SERPL-MCNC: 0.6 MG/DL — SIGNIFICANT CHANGE UP (ref 0.2–1.2)
BLD GP AB SCN SERPL QL: NEGATIVE — SIGNIFICANT CHANGE UP
BUN SERPL-MCNC: 19 MG/DL — SIGNIFICANT CHANGE UP (ref 7–23)
CALCIUM SERPL-MCNC: 8.5 MG/DL — SIGNIFICANT CHANGE UP (ref 8.4–10.5)
CHLORIDE SERPL-SCNC: 99 MMOL/L — SIGNIFICANT CHANGE UP (ref 96–108)
CO2 SERPL-SCNC: 27 MMOL/L — SIGNIFICANT CHANGE UP (ref 22–31)
CREAT SERPL-MCNC: 0.67 MG/DL — SIGNIFICANT CHANGE UP (ref 0.5–1.3)
DACRYOCYTES BLD QL SMEAR: SLIGHT — SIGNIFICANT CHANGE UP
EGFR: 80 ML/MIN/1.73M2 — SIGNIFICANT CHANGE UP
ELLIPTOCYTES BLD QL SMEAR: SLIGHT — SIGNIFICANT CHANGE UP
EOSINOPHIL # BLD AUTO: 0.33 K/UL — SIGNIFICANT CHANGE UP (ref 0–0.5)
EOSINOPHIL NFR BLD AUTO: 4.4 % — SIGNIFICANT CHANGE UP (ref 0–6)
GIANT PLATELETS BLD QL SMEAR: PRESENT — SIGNIFICANT CHANGE UP
GLUCOSE SERPL-MCNC: 139 MG/DL — HIGH (ref 70–99)
HCT VFR BLD CALC: 35 % — SIGNIFICANT CHANGE UP (ref 34.5–45)
HCT VFR BLD CALC: 35.2 % — SIGNIFICANT CHANGE UP (ref 34.5–45)
HGB BLD-MCNC: 11 G/DL — LOW (ref 11.5–15.5)
HGB BLD-MCNC: 11.1 G/DL — LOW (ref 11.5–15.5)
HYPOCHROMIA BLD QL: SLIGHT — SIGNIFICANT CHANGE UP
INR BLD: 1.61 — HIGH (ref 0.88–1.16)
LIDOCAIN IGE QN: 88 U/L — HIGH (ref 7–60)
LYMPHOCYTES # BLD AUTO: 1.25 K/UL — SIGNIFICANT CHANGE UP (ref 1–3.3)
LYMPHOCYTES # BLD AUTO: 16.8 % — SIGNIFICANT CHANGE UP (ref 13–44)
MACROCYTES BLD QL: SLIGHT — SIGNIFICANT CHANGE UP
MANUAL SMEAR VERIFICATION: SIGNIFICANT CHANGE UP
MCHC RBC-ENTMCNC: 25.5 PG — LOW (ref 27–34)
MCHC RBC-ENTMCNC: 25.6 PG — LOW (ref 27–34)
MCHC RBC-ENTMCNC: 31.4 GM/DL — LOW (ref 32–36)
MCHC RBC-ENTMCNC: 31.5 GM/DL — LOW (ref 32–36)
MCV RBC AUTO: 81 FL — SIGNIFICANT CHANGE UP (ref 80–100)
MCV RBC AUTO: 81.3 FL — SIGNIFICANT CHANGE UP (ref 80–100)
MICROCYTES BLD QL: SLIGHT — SIGNIFICANT CHANGE UP
MONOCYTES # BLD AUTO: 0.53 K/UL — SIGNIFICANT CHANGE UP (ref 0–0.9)
MONOCYTES NFR BLD AUTO: 7.1 % — SIGNIFICANT CHANGE UP (ref 2–14)
NEUTROPHILS # BLD AUTO: 5.21 K/UL — SIGNIFICANT CHANGE UP (ref 1.8–7.4)
NEUTROPHILS NFR BLD AUTO: 69 % — SIGNIFICANT CHANGE UP (ref 43–77)
NEUTS BAND # BLD: 0.9 % — SIGNIFICANT CHANGE UP (ref 0–8)
NRBC # BLD: 0 /100 WBCS — SIGNIFICANT CHANGE UP (ref 0–0)
OB PNL STL: NEGATIVE — SIGNIFICANT CHANGE UP
OVALOCYTES BLD QL SMEAR: SLIGHT — SIGNIFICANT CHANGE UP
PLAT MORPH BLD: NORMAL — SIGNIFICANT CHANGE UP
PLATELET # BLD AUTO: 166 K/UL — SIGNIFICANT CHANGE UP (ref 150–400)
PLATELET # BLD AUTO: 179 K/UL — SIGNIFICANT CHANGE UP (ref 150–400)
POIKILOCYTOSIS BLD QL AUTO: SIGNIFICANT CHANGE UP
POTASSIUM SERPL-MCNC: 4.2 MMOL/L — SIGNIFICANT CHANGE UP (ref 3.5–5.3)
POTASSIUM SERPL-SCNC: 4.2 MMOL/L — SIGNIFICANT CHANGE UP (ref 3.5–5.3)
PROT SERPL-MCNC: 6.2 G/DL — SIGNIFICANT CHANGE UP (ref 6–8.3)
PROTHROM AB SERPL-ACNC: 19.2 SEC — HIGH (ref 10.5–13.4)
RBC # BLD: 4.32 M/UL — SIGNIFICANT CHANGE UP (ref 3.8–5.2)
RBC # BLD: 4.33 M/UL — SIGNIFICANT CHANGE UP (ref 3.8–5.2)
RBC # FLD: 20.2 % — HIGH (ref 10.3–14.5)
RBC # FLD: 20.3 % — HIGH (ref 10.3–14.5)
RBC BLD AUTO: ABNORMAL
RH IG SCN BLD-IMP: NEGATIVE — SIGNIFICANT CHANGE UP
SARS-COV-2 RNA SPEC QL NAA+PROBE: NEGATIVE — SIGNIFICANT CHANGE UP
SCHISTOCYTES BLD QL AUTO: SIGNIFICANT CHANGE UP
SODIUM SERPL-SCNC: 132 MMOL/L — LOW (ref 135–145)
SPHEROCYTES BLD QL SMEAR: SLIGHT — SIGNIFICANT CHANGE UP
VARIANT LYMPHS # BLD: 1.8 % — SIGNIFICANT CHANGE UP (ref 0–6)
WBC # BLD: 6.62 K/UL — SIGNIFICANT CHANGE UP (ref 3.8–10.5)
WBC # BLD: 7.46 K/UL — SIGNIFICANT CHANGE UP (ref 3.8–10.5)
WBC # FLD AUTO: 6.62 K/UL — SIGNIFICANT CHANGE UP (ref 3.8–10.5)
WBC # FLD AUTO: 7.46 K/UL — SIGNIFICANT CHANGE UP (ref 3.8–10.5)

## 2023-01-26 PROCEDURE — 85025 COMPLETE CBC W/AUTO DIFF WBC: CPT

## 2023-01-26 PROCEDURE — 86901 BLOOD TYPING SEROLOGIC RH(D): CPT

## 2023-01-26 PROCEDURE — 83690 ASSAY OF LIPASE: CPT

## 2023-01-26 PROCEDURE — 71045 X-RAY EXAM CHEST 1 VIEW: CPT | Mod: 26

## 2023-01-26 PROCEDURE — 85610 PROTHROMBIN TIME: CPT

## 2023-01-26 PROCEDURE — 85027 COMPLETE CBC AUTOMATED: CPT

## 2023-01-26 PROCEDURE — 36415 COLL VENOUS BLD VENIPUNCTURE: CPT

## 2023-01-26 PROCEDURE — 87635 SARS-COV-2 COVID-19 AMP PRB: CPT

## 2023-01-26 PROCEDURE — 82272 OCCULT BLD FECES 1-3 TESTS: CPT

## 2023-01-26 PROCEDURE — 80053 COMPREHEN METABOLIC PANEL: CPT

## 2023-01-26 PROCEDURE — 93005 ELECTROCARDIOGRAM TRACING: CPT

## 2023-01-26 PROCEDURE — 86900 BLOOD TYPING SEROLOGIC ABO: CPT

## 2023-01-26 PROCEDURE — 86850 RBC ANTIBODY SCREEN: CPT

## 2023-01-26 PROCEDURE — 99285 EMERGENCY DEPT VISIT HI MDM: CPT | Mod: 25

## 2023-01-26 PROCEDURE — 71045 X-RAY EXAM CHEST 1 VIEW: CPT

## 2023-01-26 PROCEDURE — 85730 THROMBOPLASTIN TIME PARTIAL: CPT

## 2023-01-26 PROCEDURE — 99285 EMERGENCY DEPT VISIT HI MDM: CPT

## 2023-01-26 RX ORDER — SODIUM CHLORIDE 9 MG/ML
500 INJECTION INTRAMUSCULAR; INTRAVENOUS; SUBCUTANEOUS ONCE
Refills: 0 | Status: COMPLETED | OUTPATIENT
Start: 2023-01-26 | End: 2023-01-26

## 2023-01-26 RX ADMIN — SODIUM CHLORIDE 500 MILLILITER(S): 9 INJECTION INTRAMUSCULAR; INTRAVENOUS; SUBCUTANEOUS at 02:25

## 2023-01-26 NOTE — ED ADULT NURSE NOTE - OBJECTIVE STATEMENT
89F on eliquis presents c/o black stool and hypotension at home per family. pt denies abd pain, n/v/d, dizziness. pt recently treated for cdiff, completed antibiotics a few days ago. pt hypotensive in traige, upgraded to Dr. Bustamante. pt placed on continuous cardiac monitor. EKG completed. labs obtained from peripheral IV placed by EMS. pt [place] but alert and oriented x3.

## 2023-01-26 NOTE — ED ADULT TRIAGE NOTE - CHIEF COMPLAINT QUOTE
pt bibems, c/o dark black stools x 2 days. bp found to be in 80s with HHA, prompted ed eval. per family, pt cdiff+ 1.5 weeks ago, abx completed.

## 2023-01-26 NOTE — ED ADULT NURSE REASSESSMENT NOTE - NS ED NURSE REASSESS COMMENT FT1
Patient transport arranged with [facility]. Estimated time - 'with-in the hour' per telephone agent. Address confirmed with patient.

## 2023-01-26 NOTE — ED PROVIDER NOTE - PROGRESS NOTE DETAILS
repeat CBC - no change in h/h. occult stool negative for blood. no diarrhea or black stool while in ED. spoke with family - agree to take pt home. no further hypotension while in ED.  I have discussed the discharge plan with the patient. The patient agrees with the plan, as discussed.  The patient understands Emergency Department diagnosis is a preliminary diagnosis often based on limited information and that the patient must adhere to the follow-up plan as discussed.  The patient understands that if the symptoms worsen the patient may return to the Emergency Department at any time for further evaluation and treatment.

## 2023-01-26 NOTE — ED PROVIDER NOTE - OBJECTIVE STATEMENT
1500 89F hx afib (s/p conversion), CVA, CHF, htn, high chol, hypothyroid, AVR, brought in by EMS for black stool. per family pt with tarry stool for past few days. no abd pain. no vomiting. pt on eliquis. pt recently treated for c.diff (abx finished a few days ago). no dizziness. family states pt also with bedsore.

## 2023-01-26 NOTE — ED PROVIDER NOTE - PATIENT PORTAL LINK FT
You can access the FollowMyHealth Patient Portal offered by [facility] by registering at the following website: [email or web address]. By joining [employer]’s FollowMyHealth portal, you will also be able to view your health information using other applications (apps) compatible with our system.

## 2023-01-26 NOTE — ED PROVIDER NOTE - CLINICAL SUMMARY MEDICAL DECISION MAKING FREE TEXT BOX
c/o black stool, no abd pain on exam, pt also with low BP. stool brown on exam, will send occult to check for blood. no vomiting.   -check labs  -ekg  -cxr  -ivf

## 2023-01-26 NOTE — ED PROVIDER NOTE - NSFOLLOWUPINSTRUCTIONS_ED_ALL_ED_FT
Hypotension    WHAT YOU NEED TO KNOW:    Hypotension is a condition that causes your blood pressure (BP) to drop lower than it should be. Hypotension may be mild, serious, or life-threatening.    DISCHARGE INSTRUCTIONS:    Medicines:   •Alpha-adrenoreceptor agonists: These medicines may increase your BP and decrease your symptoms. Take these medicines as directed.     •Steroids: This medicine helps prevent salt loss from your body. Steroids may also help increase the amount of fluid in your body and raise your BP.    •Vasopressors: These medicines help constrict (make smaller) your blood vessels and increase your BP. Vasopressor medicines may increase the blood flow to your brain and help decrease your symptoms.    •Antidiuretic hormone: This medicine helps control your BP and helps decrease your need to urinate during the night.     •Antiparkinson medicine: This medicine may help increase your standing BP and decrease your symptoms.    •Take your medicine as directed. Contact your healthcare provider if you think your medicine is not helping or if you have side effects. Tell your provider if you are allergic to any medicine. Keep a list of the medicines, vitamins, and herbs you take. Include the amounts, and when and why you take them. Bring the list or the pill bottles to follow-up visits. Carry your medicine list with you in case of an emergency.    Follow up with your healthcare provider or specialist as directed: Write down your questions so you remember to ask them during your visits.    Check your blood pressure: You may need to check your BP at home. Record the results and bring them with you to follow-up visits. Ask when and how often to check your BP. You may need to wear a BP monitor for up to 24 hours. This will record your blood pressure during your normal daily activities. The monitor will take your BP every 15 to 30 minutes. Try to keep still while your BP is taken. Avoid heavy activity, such as exercise, while you are wearing the monitor.    Manage your symptoms:   •Change positions slowly: When you get out of bed, sit up first, then slowly move your legs to the side of the bed. If you are not having any symptoms, slowly stand up. If you have symptoms, sit down right away.    •Exercise and do physical counter maneuvers: Ask your healthcare provider or specialist about the best exercise plan for you. Physical counter maneuvers may help to increase your BP and increase blood flow to your heart. They include crossing your legs, squatting, and bending at the waist. You can also rise up on your toes while you are standing, and tighten your thigh muscles.    •Drink liquids as directed: Ask your healthcare provider or specialist how much liquid to drink each day and which liquids are best for you. Drink 500 milliliters (½ liter) of liquid quickly in the morning or before meals to help increase your BP. Your healthcare provider may tell you to drink 2 cups of coffee with, or after, breakfast and lunch. The caffeine in the coffee can help prevent a drop in your BP. Your healthcare provider may also give you caffeine pills.     •Change how you eat meals: If your BP drops after eating large meals, try to eat smaller meals more often. Eat foods low in carbohydrates and cholesterol to help prevent BP drops after you eat. Ask if you need to increase the amount of sodium (salt) you eat each day.    •Raise the head of your bed: Raise the head of your bed 4 to 8 inches. This may help prevent morning BP drops and decrease the need to urinate during the night.    Avoid things that make your hypotension worse:   •Do not drink alcohol: Alcohol can make your symptoms worse. Ask for information if you need help quitting.    •Avoid straining: Activities and movements that cause you to strain can cause a drop in your BP. Activities to avoid include lifting, coughing, and other movements that increase the feeling of pressure in your chest.    •Avoid the heat: This can cause a decrease in your BP. Stay inside during very hot days, or limit the amount of time you are outside. Do not take hot baths.    Contact your healthcare provider or specialist if:   •You vomit several times or have diarrhea, and you cannot drink liquid.    •You have a fever.     •You have new or increased symptoms, such as dizziness, weakness, or fainting.    •Your legs, ankles, and feet are swollen, or you gain weight for no known reason.    •You have questions or concerns about your condition or care.    Return to the emergency department if:   •You become confused or cannot speak.    •You urinate very little or not at all.    •You have a seizure.    •You have chest pain or trouble breathing.    •You have changes in vision or cannot see.

## 2023-02-07 PROCEDURE — 83540 ASSAY OF IRON: CPT

## 2023-02-07 PROCEDURE — 84156 ASSAY OF PROTEIN URINE: CPT

## 2023-02-07 PROCEDURE — 97530 THERAPEUTIC ACTIVITIES: CPT

## 2023-02-07 PROCEDURE — 84436 ASSAY OF TOTAL THYROXINE: CPT

## 2023-02-07 PROCEDURE — 74177 CT ABD & PELVIS W/CONTRAST: CPT

## 2023-02-07 PROCEDURE — 80061 LIPID PANEL: CPT

## 2023-02-07 PROCEDURE — 81001 URINALYSIS AUTO W/SCOPE: CPT

## 2023-02-07 PROCEDURE — 97165 OT EVAL LOW COMPLEX 30 MIN: CPT

## 2023-02-07 PROCEDURE — 82570 ASSAY OF URINE CREATININE: CPT

## 2023-02-07 PROCEDURE — 83036 HEMOGLOBIN GLYCOSYLATED A1C: CPT

## 2023-02-07 PROCEDURE — 99285 EMERGENCY DEPT VISIT HI MDM: CPT | Mod: 25

## 2023-02-07 PROCEDURE — 84145 PROCALCITONIN (PCT): CPT

## 2023-02-07 PROCEDURE — 76705 ECHO EXAM OF ABDOMEN: CPT

## 2023-02-07 PROCEDURE — 85610 PROTHROMBIN TIME: CPT

## 2023-02-07 PROCEDURE — 96374 THER/PROPH/DIAG INJ IV PUSH: CPT

## 2023-02-07 PROCEDURE — 82728 ASSAY OF FERRITIN: CPT

## 2023-02-07 PROCEDURE — 85025 COMPLETE CBC W/AUTO DIFF WBC: CPT

## 2023-02-07 PROCEDURE — 36415 COLL VENOUS BLD VENIPUNCTURE: CPT

## 2023-02-07 PROCEDURE — 97162 PT EVAL MOD COMPLEX 30 MIN: CPT

## 2023-02-07 PROCEDURE — 87040 BLOOD CULTURE FOR BACTERIA: CPT

## 2023-02-07 PROCEDURE — 85730 THROMBOPLASTIN TIME PARTIAL: CPT

## 2023-02-07 PROCEDURE — 93306 TTE W/DOPPLER COMPLETE: CPT

## 2023-02-07 PROCEDURE — 87086 URINE CULTURE/COLONY COUNT: CPT

## 2023-02-07 PROCEDURE — 83880 ASSAY OF NATRIURETIC PEPTIDE: CPT

## 2023-02-07 PROCEDURE — 83930 ASSAY OF BLOOD OSMOLALITY: CPT

## 2023-02-07 PROCEDURE — 71045 X-RAY EXAM CHEST 1 VIEW: CPT

## 2023-02-07 PROCEDURE — 85027 COMPLETE CBC AUTOMATED: CPT

## 2023-02-07 PROCEDURE — 87635 SARS-COV-2 COVID-19 AMP PRB: CPT

## 2023-02-07 PROCEDURE — 80053 COMPREHEN METABOLIC PANEL: CPT

## 2023-02-07 PROCEDURE — 84443 ASSAY THYROID STIM HORMONE: CPT

## 2023-02-07 PROCEDURE — 0225U NFCT DS DNA&RNA 21 SARSCOV2: CPT

## 2023-02-07 PROCEDURE — 83605 ASSAY OF LACTIC ACID: CPT

## 2023-02-07 PROCEDURE — 84100 ASSAY OF PHOSPHORUS: CPT

## 2023-02-07 PROCEDURE — 80048 BASIC METABOLIC PNL TOTAL CA: CPT

## 2023-02-07 PROCEDURE — 84133 ASSAY OF URINE POTASSIUM: CPT

## 2023-02-07 PROCEDURE — 74018 RADEX ABDOMEN 1 VIEW: CPT

## 2023-02-07 PROCEDURE — 84300 ASSAY OF URINE SODIUM: CPT

## 2023-02-07 PROCEDURE — 83735 ASSAY OF MAGNESIUM: CPT

## 2023-02-07 PROCEDURE — 83935 ASSAY OF URINE OSMOLALITY: CPT

## 2023-02-07 PROCEDURE — 85379 FIBRIN DEGRADATION QUANT: CPT

## 2023-02-07 PROCEDURE — 83550 IRON BINDING TEST: CPT

## 2023-02-07 PROCEDURE — 87186 SC STD MICRODIL/AGAR DIL: CPT

## 2023-02-07 PROCEDURE — 82247 BILIRUBIN TOTAL: CPT

## 2023-02-07 PROCEDURE — 97116 GAIT TRAINING THERAPY: CPT

## 2023-02-07 PROCEDURE — 84540 ASSAY OF URINE/UREA-N: CPT

## 2023-02-07 PROCEDURE — 93971 EXTREMITY STUDY: CPT

## 2023-02-07 PROCEDURE — 94640 AIRWAY INHALATION TREATMENT: CPT

## 2023-02-07 PROCEDURE — 84484 ASSAY OF TROPONIN QUANT: CPT

## 2023-02-07 PROCEDURE — 82248 BILIRUBIN DIRECT: CPT

## 2023-02-07 PROCEDURE — 82553 CREATINE MB FRACTION: CPT

## 2023-02-07 PROCEDURE — 93005 ELECTROCARDIOGRAM TRACING: CPT

## 2023-02-07 PROCEDURE — 82550 ASSAY OF CK (CPK): CPT

## 2023-07-25 NOTE — ED ADULT NURSE NOTE - NSIMPLEMENTINTERV_GEN_ALL_ED
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 Pain to right hip and across right chest.         Triage Assessment       Row Name 07/25/23 1147       Triage Assessment (Adult)    Airway WDL WDL       Respiratory WDL    Respiratory WDL WDL                    
Implemented All Fall with Harm Risk Interventions:  [place] to call system. Call bell, personal items and telephone within reach. Instruct patient to call for assistance. Room bathroom lighting operational. Non-slip footwear when patient is off stretcher. Physically safe environment: no spills, clutter or unnecessary equipment. Stretcher in lowest position, wheels locked, appropriate side rails in place. Provide visual cue, wrist band, yellow gown, etc. Monitor gait and stability. Monitor for mental status changes and reorient to person, place, and time. Review medications for side effects contributing to fall risk. Reinforce activity limits and safety measures with patient and family. Provide visual clues: red socks.

## 2023-10-12 NOTE — ED ADULT NURSE NOTE - NS ED NURSE LEVEL OF CONSCIOUSNESS ORIENTATION
Price (Do Not Change): 0.00
Instructions: This plan will send the code FBSD to the PM system.  DO NOT or CHANGE the price.
Detail Level: Simple
Oriented - self; Oriented - place; Oriented - time

## 2025-05-20 NOTE — ED ADULT NURSE NOTE - CHPI ED NUR SYMPTOMS NEG
Yes
no abdominal distension/no burning urination/no chills/no diarrhea/no dysuria/no fever/no hematuria/no nausea/no vomiting

## 2025-06-02 NOTE — CONSULT NOTE ADULT - ASSESSMENT
89F PMHx HTN, HLD, Hypothyroidism, TIA, s/p AVR, AFib (on Eliquis, s/p ablation > 10yrs ago), CHF (per outpatient bedside echo EF appears reduced) who presented to [facility] ED on 12/20/22 endorsing worsening LE edema over the past 2 months. Pt's outpatient cardiologist, Dr. David, sent her to [facility], at which time a bedside limited echo was done showing a possibly reduced LVEF. Pt found to have volume overload peripherally and in AFib w/ RVR (HR 100s). Pt denies CP, SOB, palpitations, dizziness, orthopnea, N/V, fever/chills, syncope, PND.     #AFib RVR (-120s)  Per patient, Hx ablation >10yrs ago. C/o x2mths worsening LE edema iso CHF, volume overloaded on exam. Presented in AFib RVR, chronicity unclear. Holding home Atenolol 25mg Qd, started on Lopressor 25mg q6h w/o conversion to NSR so far. [id number] 8pts, resumed home Eliquis 5mg BID.  - Cardioversion 89F PMHx HTN, HLD, Hypothyroidism, TIA, s/p AVR, AFib (on Eliquis, s/p ablation > 10yrs ago), CHF (per outpatient bedside echo EF appears reduced) who presented to [facility] ED on 12/20/22 endorsing worsening LE edema over the past 2 months. Pt's outpatient cardiologist, Dr. David, sent her to [facility], at which time a bedside limited echo was done showing a possibly reduced LVEF. Pt found to have volume overload peripherally and in AFib w/ RVR (HR 100s). Pt denies CP, SOB, palpitations, dizziness, orthopnea, N/V, fever/chills, syncope, PND.     #AFib RVR (-120s)  Per patient, Hx ablation >10yrs ago. C/o x2mths worsening LE edema iso CHF, volume overloaded on exam. Presented in AFib RVR, chronicity unclear. Holding home Atenolol 25mg Qd, started on Lopressor 25mg q6h w/o conversion to NSR (1 dose received so far). [id number] 8pts, resumed home Eliquis 5mg BID. Unclear if AFib is the cause of her HF exacerbation or the result of LA dilation iso volume overload.  - c/w Lopressor 25mg q6h dosing, will reassess rhythm in 24hrs  - f/u TTE to assess EF% & LA dilation  - c/w diuresis iso volume overload (per primary team) 89F PMHx HTN, HLD, Hypothyroidism, TIA, s/p AVR, AFib (on Eliquis, s/p ablation > 10yrs ago), CHF (per outpatient bedside echo EF appears reduced) who presented to [facility] ED on 12/20/22 endorsing worsening LE edema over the past 2 months. Pt's outpatient cardiologist, Dr. David, sent her to [facility], at which time a bedside limited echo was done showing a possibly reduced LVEF. Pt found to have volume overload peripherally and in AFib w/ RVR (HR 100s). Pt denies CP, SOB, palpitations, dizziness, orthopnea, N/V, fever/chills, syncope, PND.     #AFib RVR (-120s)  Per patient, Hx ablation >10yrs ago. C/o x2mths worsening LE edema iso CHF, volume overloaded on exam. Presented in AFib RVR, chronicity unclear. Holding home Atenolol 25mg Qd, started on Lopressor 25mg q6h w/o conversion to NSR (1 dose received so far). [id number] 8pts, resumed home Eliquis 5mg BID. Unclear if AFib is the cause of her HF exacerbation or the result of LA dilation iso volume overload.  - c/w Lopressor 25mg q6h dosing, will reassess telemetry in 24hrs  - f/u TTE to assess EF% & LA dilation  - Recommend to c/w diuresis iso volume overload Aperture Size (Optional): C Detail Level: Detailed Render Note In Bullet Format When Appropriate: No Consent: The patient's consent was obtained including but not limited to risks of crusting, scabbing, blistering, scarring, darker or lighter pigmentary change, recurrence, incomplete removal and infection. Number Of Freeze-Thaw Cycles: 1 freeze-thaw cycle Duration Of Freeze Thaw-Cycle (Seconds): 10 Show Applicator Variable?: Yes Post-Care Instructions: I reviewed with the patient in detail post-care instructions. Patient is to wear sunprotection, and avoid picking at any of the treated lesions. Pt may apply Vaseline to crusted or scabbing areas.